# Patient Record
Sex: MALE | Race: WHITE | NOT HISPANIC OR LATINO | Employment: OTHER | URBAN - METROPOLITAN AREA
[De-identification: names, ages, dates, MRNs, and addresses within clinical notes are randomized per-mention and may not be internally consistent; named-entity substitution may affect disease eponyms.]

---

## 2017-06-01 ENCOUNTER — OFFICE VISIT (OUTPATIENT)
Dept: NEUROLOGY | Facility: CLINIC | Age: 77
End: 2017-06-01

## 2017-06-01 VITALS
WEIGHT: 200 LBS | OXYGEN SATURATION: 98 % | HEIGHT: 69 IN | BODY MASS INDEX: 29.62 KG/M2 | DIASTOLIC BLOOD PRESSURE: 95 MMHG | SYSTOLIC BLOOD PRESSURE: 158 MMHG | HEART RATE: 65 BPM

## 2017-06-01 DIAGNOSIS — G93.89 MASS OF LEFT TEMPORAL LOBE: Primary | ICD-10-CM

## 2017-06-01 DIAGNOSIS — R56.9 GENERALIZED CONVULSIVE SEIZURES (HCC): ICD-10-CM

## 2017-06-01 PROCEDURE — 99213 OFFICE O/P EST LOW 20 MIN: CPT | Performed by: PSYCHIATRY & NEUROLOGY

## 2017-06-01 RX ORDER — LEVETIRACETAM 500 MG/1
500 TABLET ORAL 2 TIMES DAILY
Qty: 60 TABLET | Refills: 11 | Status: SHIPPED | OUTPATIENT
Start: 2017-06-01 | End: 2017-11-14 | Stop reason: SDUPTHER

## 2017-06-01 RX ORDER — LATANOPROST 50 UG/ML
SOLUTION/ DROPS OPHTHALMIC
COMMUNITY
Start: 2017-05-25

## 2017-06-01 NOTE — PROGRESS NOTES
Subjective:     Patient ID: Jonatan Ocampo is a 77 y.o. male.    History of Present Illness  The following portions of the patient's history were reviewed and updated as appropriate: allergies, current medications, past family history, past medical history, past social history, past surgical history and problem list.  No seizures, headaches, tolerating Keppra, no chest pain or syncope. He has developed glaucoma, better with eye drops, has been active outside. Last MRI in December was stable.  Review of Systems   Constitutional: Positive for fatigue. Negative for chills, fever and unexpected weight change.   HENT: Negative for ear pain, hearing loss, nosebleeds, rhinorrhea and sore throat.    Eyes: Negative for photophobia, pain, discharge, itching and visual disturbance.   Respiratory: Negative for cough, chest tightness, shortness of breath and wheezing.    Cardiovascular: Negative for chest pain, palpitations and leg swelling.   Gastrointestinal: Negative for abdominal pain, blood in stool, constipation, diarrhea, nausea and vomiting.   Genitourinary: Negative for dysuria, frequency, hematuria and urgency.   Musculoskeletal: Negative for arthralgias, back pain, gait problem, joint swelling, myalgias, neck pain and neck stiffness.   Skin: Negative for rash and wound.   Allergic/Immunologic: Negative for environmental allergies and food allergies.   Neurological: Negative for dizziness, tremors, seizures, syncope, speech difficulty, weakness, light-headedness, numbness and headaches.   Hematological: Negative for adenopathy. Does not bruise/bleed easily.   Psychiatric/Behavioral: Negative for agitation, confusion, decreased concentration, hallucinations, sleep disturbance and suicidal ideas. The patient is not nervous/anxious.         Objective:    Neurologic Exam     Mental Status   Oriented to person, place, and time.       Physical Exam   Constitutional: He is oriented to person, place, and time. He appears  well-developed and well-nourished.   Cardiovascular: Normal rate and regular rhythm.    Pulmonary/Chest: Effort normal.   Neurological: He is alert and oriented to person, place, and time. He has normal reflexes.   Slight facial asymmetry.   Psychiatric: He has a normal mood and affect. His behavior is normal. Thought content normal.       Assessment/Plan:     Jonatan was seen today for seizures.    Diagnoses and all orders for this visit:    Mass of left temporal lobe  -     MRI Brain Without Contrast; Future    Generalized convulsive seizures  -     levETIRAcetam (KEPPRA) 500 MG tablet; Take 1 tablet by mouth 2 (Two) Times a Day.     Encouraged to call for new concerns.

## 2017-09-13 ENCOUNTER — TELEPHONE (OUTPATIENT)
Dept: NEUROLOGY | Facility: CLINIC | Age: 77
End: 2017-09-13

## 2017-09-13 NOTE — TELEPHONE ENCOUNTER
Patient wife called, stated that the time you rescheduled her for was not a good time because Dr Henning wanted to see the patient after he had the MRI completed on 11/14. Please call patient wife to schedule the appointment. She stated that she wanted you to call because she doesn't want just any certain day or time. Verified phone in the chart.

## 2017-11-14 ENCOUNTER — OFFICE VISIT (OUTPATIENT)
Dept: NEUROLOGY | Facility: CLINIC | Age: 77
End: 2017-11-14

## 2017-11-14 ENCOUNTER — HOSPITAL ENCOUNTER (OUTPATIENT)
Dept: MRI IMAGING | Facility: HOSPITAL | Age: 77
Discharge: HOME | End: 2017-11-14
Attending: PSYCHIATRY & NEUROLOGY | Admitting: PSYCHIATRY & NEUROLOGY

## 2017-11-14 VITALS
SYSTOLIC BLOOD PRESSURE: 158 MMHG | OXYGEN SATURATION: 95 % | WEIGHT: 195 LBS | HEIGHT: 69 IN | HEART RATE: 69 BPM | BODY MASS INDEX: 28.88 KG/M2 | DIASTOLIC BLOOD PRESSURE: 86 MMHG

## 2017-11-14 DIAGNOSIS — G93.89 MASS OF LEFT TEMPORAL LOBE: Primary | ICD-10-CM

## 2017-11-14 DIAGNOSIS — G93.89 MASS OF LEFT TEMPORAL LOBE: ICD-10-CM

## 2017-11-14 DIAGNOSIS — R56.9 GENERALIZED CONVULSIVE SEIZURES (HCC): ICD-10-CM

## 2017-11-14 PROCEDURE — 70551 MRI BRAIN STEM W/O DYE: CPT

## 2017-11-14 PROCEDURE — 99213 OFFICE O/P EST LOW 20 MIN: CPT | Performed by: PSYCHIATRY & NEUROLOGY

## 2017-11-14 RX ORDER — LEVETIRACETAM 500 MG/1
500 TABLET ORAL 2 TIMES DAILY
Qty: 180 TABLET | Refills: 3 | Status: SHIPPED | OUTPATIENT
Start: 2017-11-14 | End: 2018-11-06 | Stop reason: SDUPTHER

## 2017-11-14 NOTE — PROGRESS NOTES
Subjective:     Patient ID: Jonatan Ocampo is a 77 y.o. male.    CC:   Chief Complaint   Patient presents with   • Mass of left temporal lobe       HPI:   History of Present Illness  The following portions of the patient's history were reviewed and updated as appropriate: allergies, current medications, past family history, past medical history, past social history, past surgical history and problem list.     Denies headaches, seizures, focal symptoms. Has been driving without problems. MRI brain done today is stable by my review and Dr. Alexandra's report.    Past Medical History:   Diagnosis Date   • Abnormal MRI of head    • Generalized convulsive seizure    • Hyperlipidemia    • Hypertension    • Seizures        Past Surgical History:   Procedure Laterality Date   • NO PAST SURGERIES         Social History     Social History   • Marital status:      Spouse name: N/A   • Number of children: N/A   • Years of education: N/A     Occupational History   • Not on file.     Social History Main Topics   • Smoking status: Never Smoker   • Smokeless tobacco: Not on file   • Alcohol use No   • Drug use: No   • Sexual activity: Defer     Other Topics Concern   • Not on file     Social History Narrative       Family History   Problem Relation Age of Onset   • Alzheimer's disease Mother    • Depression Mother    • Dementia Mother    • Cancer Mother         Review of Systems   Constitutional: Positive for fatigue. Negative for chills, fever and unexpected weight change.   HENT: Negative.  Negative for ear pain, hearing loss, nosebleeds, rhinorrhea and sore throat.    Eyes: Positive for itching. Negative for photophobia, pain, discharge and visual disturbance.   Respiratory: Negative.  Negative for cough, chest tightness, shortness of breath and wheezing.    Cardiovascular: Negative.  Negative for chest pain, palpitations and leg swelling.   Gastrointestinal: Negative.  Negative for abdominal pain, blood in stool,  constipation, diarrhea, nausea and vomiting.   Endocrine: Negative.    Genitourinary: Negative.  Negative for dysuria, frequency, hematuria and urgency.   Musculoskeletal: Negative.  Negative for arthralgias, back pain, gait problem, joint swelling, myalgias, neck pain and neck stiffness.   Skin: Negative.  Negative for rash and wound.   Allergic/Immunologic: Negative.  Negative for environmental allergies and food allergies.   Neurological: Negative.  Negative for dizziness, tremors, seizures, syncope, speech difficulty, weakness, light-headedness, numbness and headaches.   Hematological: Negative.  Negative for adenopathy. Does not bruise/bleed easily.   Psychiatric/Behavioral: Negative.  Negative for agitation, confusion, decreased concentration, hallucinations, sleep disturbance and suicidal ideas. The patient is not nervous/anxious.         Objective:    Neurologic Exam     Mental Status   Oriented to person, place, and time.       Physical Exam   Constitutional: He is oriented to person, place, and time. He appears well-developed and well-nourished.   Cardiovascular: Normal rate and regular rhythm.    Pulmonary/Chest: Effort normal.   Neurological: He is alert and oriented to person, place, and time. He has normal reflexes.   Psychiatric: He has a normal mood and affect. His behavior is normal. Thought content normal.       Assessment/Plan:       Jonatan was seen today for mass of left temporal lobe.    Diagnoses and all orders for this visit:    Mass of left temporal lobe  -     MRI Brain Without Contrast; Future    Generalized convulsive seizures  -     levETIRAcetam (KEPPRA) 500 MG tablet; Take 1 tablet by mouth 2 (Two) Times a Day.       He is to follow up yearly, call for problems.    Cesar Henning MD  11/21/2017

## 2018-10-30 ENCOUNTER — HOSPITAL ENCOUNTER (OUTPATIENT)
Dept: MRI IMAGING | Facility: HOSPITAL | Age: 78
Discharge: HOME | End: 2018-10-30
Attending: PSYCHIATRY & NEUROLOGY | Admitting: PSYCHIATRY & NEUROLOGY

## 2018-10-30 DIAGNOSIS — G93.89 MASS OF LEFT TEMPORAL LOBE: ICD-10-CM

## 2018-10-30 PROCEDURE — 70551 MRI BRAIN STEM W/O DYE: CPT

## 2018-11-06 ENCOUNTER — OFFICE VISIT (OUTPATIENT)
Dept: NEUROLOGY | Facility: CLINIC | Age: 78
End: 2018-11-06

## 2018-11-06 ENCOUNTER — TELEPHONE (OUTPATIENT)
Dept: NEUROLOGY | Facility: CLINIC | Age: 78
End: 2018-11-06

## 2018-11-06 VITALS
HEART RATE: 65 BPM | HEIGHT: 69 IN | SYSTOLIC BLOOD PRESSURE: 168 MMHG | DIASTOLIC BLOOD PRESSURE: 108 MMHG | OXYGEN SATURATION: 98 % | WEIGHT: 195 LBS | BODY MASS INDEX: 28.88 KG/M2

## 2018-11-06 DIAGNOSIS — G93.89 MASS OF LEFT TEMPORAL LOBE: Primary | ICD-10-CM

## 2018-11-06 DIAGNOSIS — R56.9 GENERALIZED CONVULSIVE SEIZURES (HCC): ICD-10-CM

## 2018-11-06 PROCEDURE — 99213 OFFICE O/P EST LOW 20 MIN: CPT | Performed by: PSYCHIATRY & NEUROLOGY

## 2018-11-06 RX ORDER — LEVETIRACETAM 500 MG/1
500 TABLET ORAL EVERY 12 HOURS SCHEDULED
Qty: 180 TABLET | Refills: 5 | Status: SHIPPED | OUTPATIENT
Start: 2018-11-06 | End: 2020-01-09 | Stop reason: SDUPTHER

## 2018-11-06 NOTE — PROGRESS NOTES
Subjective:     Patient ID: Jonatan Ocampo is a 78 y.o. male.    CC:   Chief Complaint   Patient presents with   • Seizures       HPI:   History of Present Illness  The following portions of the patient's history were reviewed and updated as appropriate: allergies, current medications, past family history, past medical history, past social history, past surgical history and problem list.     Denies any seizures or headaches, MRI brain stable, personally reviewed.    Past Medical History:   Diagnosis Date   • Abnormal MRI of head    • Generalized convulsive seizure (CMS/HCC)    • Hyperlipidemia    • Hypertension    • Seizures (CMS/HCC)        Past Surgical History:   Procedure Laterality Date   • NO PAST SURGERIES         Social History     Social History   • Marital status:      Spouse name: N/A   • Number of children: N/A   • Years of education: N/A     Occupational History   • Not on file.     Social History Main Topics   • Smoking status: Never Smoker   • Smokeless tobacco: Not on file   • Alcohol use No   • Drug use: No   • Sexual activity: Defer     Other Topics Concern   • Not on file     Social History Narrative   • No narrative on file       Family History   Problem Relation Age of Onset   • Alzheimer's disease Mother    • Depression Mother    • Dementia Mother    • Cancer Mother         Review of Systems   Constitutional: Positive for fatigue. Negative for chills, fever and unexpected weight change.   HENT: Negative for ear pain, hearing loss, nosebleeds, rhinorrhea and sore throat.    Eyes: Positive for itching. Negative for photophobia, pain, discharge and visual disturbance.   Respiratory: Negative for cough, chest tightness, shortness of breath and wheezing.    Cardiovascular: Negative for chest pain, palpitations and leg swelling.   Gastrointestinal: Negative for abdominal pain, blood in stool, constipation, diarrhea, nausea and vomiting.   Genitourinary: Negative for dysuria, frequency,  hematuria and urgency.   Musculoskeletal: Negative for arthralgias, back pain, gait problem, joint swelling, myalgias, neck pain and neck stiffness.   Skin: Negative for rash and wound.   Allergic/Immunologic: Negative for environmental allergies and food allergies.   Neurological: Negative for dizziness, tremors, seizures, syncope, speech difficulty, weakness, light-headedness, numbness and headaches.   Hematological: Negative for adenopathy. Does not bruise/bleed easily.   Psychiatric/Behavioral: Negative for agitation, confusion, decreased concentration, hallucinations, sleep disturbance and suicidal ideas. The patient is not nervous/anxious.         Objective:    Neurologic Exam     Mental Status   Oriented to person, place, and time.       Physical Exam   Constitutional: He is oriented to person, place, and time. He appears well-developed and well-nourished.   Cardiovascular: Normal rate and regular rhythm.    Pulmonary/Chest: Effort normal.   Neurological: He is alert and oriented to person, place, and time. He has normal reflexes.   Mild facial asymmetry   Psychiatric: He has a normal mood and affect. His behavior is normal. Thought content normal.       Assessment/Plan:       Jonatan was seen today for seizures.    Diagnoses and all orders for this visit:    Mass of left temporal lobe  -     MRI Brain Without Contrast; Future April 2020.    Generalized convulsive seizures (CMS/HCC)  -     levETIRAcetam (KEPPRA) 500 MG tablet; Take 1 tablet by mouth Every 12 (Twelve) Hours.             Cesar Henning MD  11/6/2018

## 2018-11-06 NOTE — TELEPHONE ENCOUNTER
----- Message from Cesar Henning MD sent at 11/2/2018  2:38 PM EDT -----  Regarding: MRI brain  Stable from last year, thanks.  ----- Message -----  From: Interface, Rad Results [place] In  Sent: 10/30/2018   3:58 PM  To: Cesar Henning MD

## 2020-01-01 ENCOUNTER — OFFICE VISIT (OUTPATIENT)
Dept: NEUROLOGY | Facility: CLINIC | Age: 80
End: 2020-01-01

## 2020-01-01 ENCOUNTER — LAB (OUTPATIENT)
Dept: LAB | Facility: HOSPITAL | Age: 80
End: 2020-01-01

## 2020-01-01 ENCOUNTER — TELEPHONE (OUTPATIENT)
Dept: NEUROLOGY | Facility: CLINIC | Age: 80
End: 2020-01-01

## 2020-01-01 VITALS
OXYGEN SATURATION: 97 % | HEART RATE: 67 BPM | HEIGHT: 68 IN | TEMPERATURE: 97.1 F | DIASTOLIC BLOOD PRESSURE: 90 MMHG | WEIGHT: 200 LBS | BODY MASS INDEX: 30.31 KG/M2 | SYSTOLIC BLOOD PRESSURE: 150 MMHG

## 2020-01-01 DIAGNOSIS — R53.82 CHRONIC FATIGUE: ICD-10-CM

## 2020-01-01 DIAGNOSIS — G93.89: ICD-10-CM

## 2020-01-01 DIAGNOSIS — R41.3 MEMORY LOSS: Primary | ICD-10-CM

## 2020-01-01 DIAGNOSIS — G47.10 HYPERSOMNOLENCE: ICD-10-CM

## 2020-01-01 DIAGNOSIS — R56.9 GENERALIZED CONVULSIVE SEIZURES (HCC): ICD-10-CM

## 2020-01-01 LAB
ALBUMIN SERPL-MCNC: 4.1 G/DL (ref 3.5–5.2)
ALBUMIN/GLOB SERPL: 1.1 G/DL
ALP SERPL-CCNC: 60 U/L (ref 39–117)
ALT SERPL W P-5'-P-CCNC: 13 U/L (ref 1–41)
ANION GAP SERPL CALCULATED.3IONS-SCNC: 6.5 MMOL/L (ref 5–15)
AST SERPL-CCNC: 19 U/L (ref 1–40)
BASOPHILS # BLD AUTO: 0.05 10*3/MM3 (ref 0–0.2)
BASOPHILS NFR BLD AUTO: 0.6 % (ref 0–1.5)
BILIRUB SERPL-MCNC: 0.6 MG/DL (ref 0–1.2)
BUN SERPL-MCNC: 17 MG/DL (ref 8–23)
BUN/CREAT SERPL: 14.3 (ref 7–25)
CALCIUM SPEC-SCNC: 9.5 MG/DL (ref 8.6–10.5)
CHLORIDE SERPL-SCNC: 105 MMOL/L (ref 98–107)
CO2 SERPL-SCNC: 28.5 MMOL/L (ref 22–29)
CREAT SERPL-MCNC: 1.19 MG/DL (ref 0.76–1.27)
DEPRECATED RDW RBC AUTO: 48.8 FL (ref 37–54)
EOSINOPHIL # BLD AUTO: 0.14 10*3/MM3 (ref 0–0.4)
EOSINOPHIL NFR BLD AUTO: 1.8 % (ref 0.3–6.2)
ERYTHROCYTE [DISTWIDTH] IN BLOOD BY AUTOMATED COUNT: 13.9 % (ref 12.3–15.4)
FOLATE SERPL-MCNC: 16.4 NG/ML (ref 4.78–24.2)
GFR SERPL CREATININE-BSD FRML MDRD: 59 ML/MIN/1.73
GLOBULIN UR ELPH-MCNC: 3.6 GM/DL
GLUCOSE SERPL-MCNC: 81 MG/DL (ref 65–99)
HCT VFR BLD AUTO: 43.4 % (ref 37.5–51)
HGB BLD-MCNC: 14.6 G/DL (ref 13–17.7)
IMM GRANULOCYTES # BLD AUTO: 0.01 10*3/MM3 (ref 0–0.05)
IMM GRANULOCYTES NFR BLD AUTO: 0.1 % (ref 0–0.5)
LYMPHOCYTES # BLD AUTO: 1.55 10*3/MM3 (ref 0.7–3.1)
LYMPHOCYTES NFR BLD AUTO: 19.9 % (ref 19.6–45.3)
Lab: NORMAL
MCH RBC QN AUTO: 31.9 PG (ref 26.6–33)
MCHC RBC AUTO-ENTMCNC: 33.6 G/DL (ref 31.5–35.7)
MCV RBC AUTO: 95 FL (ref 79–97)
METHYLMALONATE SERPL-SCNC: 173 NMOL/L (ref 0–378)
MONOCYTES # BLD AUTO: 0.66 10*3/MM3 (ref 0.1–0.9)
MONOCYTES NFR BLD AUTO: 8.5 % (ref 5–12)
NEUTROPHILS NFR BLD AUTO: 5.36 10*3/MM3 (ref 1.7–7)
NEUTROPHILS NFR BLD AUTO: 69.1 % (ref 42.7–76)
NRBC BLD AUTO-RTO: 0 /100 WBC (ref 0–0.2)
PLATELET # BLD AUTO: 229 10*3/MM3 (ref 140–450)
PMV BLD AUTO: 10.1 FL (ref 6–12)
POTASSIUM SERPL-SCNC: 4.2 MMOL/L (ref 3.5–5.2)
PROT SERPL-MCNC: 7.7 G/DL (ref 6–8.5)
RBC # BLD AUTO: 4.57 10*6/MM3 (ref 4.14–5.8)
SODIUM SERPL-SCNC: 140 MMOL/L (ref 136–145)
T4 FREE SERPL-MCNC: 1 NG/DL (ref 0.93–1.7)
TSH SERPL DL<=0.05 MIU/L-ACNC: 1.69 UIU/ML (ref 0.27–4.2)
VIT B12 BLD-MCNC: 456 PG/ML (ref 211–946)
WBC # BLD AUTO: 7.77 10*3/MM3 (ref 3.4–10.8)

## 2020-01-01 PROCEDURE — 83921 ORGANIC ACID SINGLE QUANT: CPT | Performed by: PSYCHIATRY & NEUROLOGY

## 2020-01-01 PROCEDURE — 99214 OFFICE O/P EST MOD 30 MIN: CPT | Performed by: PSYCHIATRY & NEUROLOGY

## 2020-01-01 PROCEDURE — 85025 COMPLETE CBC W/AUTO DIFF WBC: CPT | Performed by: PSYCHIATRY & NEUROLOGY

## 2020-01-01 PROCEDURE — 82607 VITAMIN B-12: CPT | Performed by: PSYCHIATRY & NEUROLOGY

## 2020-01-01 PROCEDURE — 84443 ASSAY THYROID STIM HORMONE: CPT | Performed by: PSYCHIATRY & NEUROLOGY

## 2020-01-01 PROCEDURE — 82746 ASSAY OF FOLIC ACID SERUM: CPT | Performed by: PSYCHIATRY & NEUROLOGY

## 2020-01-01 PROCEDURE — 80053 COMPREHEN METABOLIC PANEL: CPT | Performed by: PSYCHIATRY & NEUROLOGY

## 2020-01-01 PROCEDURE — 36415 COLL VENOUS BLD VENIPUNCTURE: CPT | Performed by: PSYCHIATRY & NEUROLOGY

## 2020-01-01 PROCEDURE — 84439 ASSAY OF FREE THYROXINE: CPT | Performed by: PSYCHIATRY & NEUROLOGY

## 2020-01-01 RX ORDER — LEVETIRACETAM 500 MG/1
250 TABLET ORAL EVERY 12 HOURS SCHEDULED
Qty: 180 TABLET | Refills: 5
Start: 2020-01-01

## 2020-01-09 ENCOUNTER — TELEPHONE (OUTPATIENT)
Dept: NEUROLOGY | Facility: CLINIC | Age: 80
End: 2020-01-09

## 2020-01-09 DIAGNOSIS — G93.89 MASS OF LEFT TEMPORAL LOBE: ICD-10-CM

## 2020-01-09 DIAGNOSIS — R56.9 GENERALIZED CONVULSIVE SEIZURES (HCC): Primary | ICD-10-CM

## 2020-01-09 RX ORDER — LEVETIRACETAM 500 MG/1
500 TABLET ORAL EVERY 12 HOURS SCHEDULED
Qty: 180 TABLET | Refills: 5 | Status: SHIPPED | OUTPATIENT
Start: 2020-01-09 | End: 2020-01-01 | Stop reason: SDUPTHER

## 2020-01-09 NOTE — TELEPHONE ENCOUNTER
Patient and wife came into the office and was wanting to know if you can put in a new order for the MRI and refill his prescription for Keppra. He has an appt in March and he hasn't been in office since November of 2018 Please advise

## 2020-01-30 ENCOUNTER — HOSPITAL ENCOUNTER (OUTPATIENT)
Dept: MRI IMAGING | Facility: HOSPITAL | Age: 80
Discharge: HOME | End: 2020-01-30
Admitting: PSYCHIATRY & NEUROLOGY

## 2020-01-30 PROCEDURE — 70551 MRI BRAIN STEM W/O DYE: CPT

## 2020-01-31 ENCOUNTER — TELEPHONE (OUTPATIENT)
Dept: NEUROLOGY | Facility: CLINIC | Age: 80
End: 2020-01-31

## 2020-01-31 NOTE — TELEPHONE ENCOUNTER
----- Message from Cesar Henning MD sent at 1/30/2020  6:50 PM EST -----  Regarding: MRI brain  Stable from 2018, call for problems, thanks.  ----- Message -----  From: Interface, Rad Results [place] In  Sent: 1/30/2020   5:56 PM EST  To: Cesar Henning MD

## 2020-11-05 NOTE — TELEPHONE ENCOUNTER
Patients spouse called and requested that we mail out the most recent lab results and contact them and go over the lab results with them also.       Can we please mail those results to them so he can have them in hand to take to his next PCP visit in 3 weeks?       Can someone also please contact them and go over these results?       [phone]

## 2021-01-01 ENCOUNTER — APPOINTMENT (OUTPATIENT)
Dept: GENERAL RADIOLOGY | Facility: HOSPITAL | Age: 81
End: 2021-01-01

## 2021-01-01 ENCOUNTER — HOSPITAL ENCOUNTER (INPATIENT)
Facility: HOSPITAL | Age: 81
LOS: 1 days | End: 2021-07-10
Attending: INTERNAL MEDICINE | Admitting: INTERNAL MEDICINE

## 2021-01-01 ENCOUNTER — APPOINTMENT (OUTPATIENT)
Dept: CARDIOLOGY | Facility: HOSPITAL | Age: 81
End: 2021-01-01

## 2021-01-01 ENCOUNTER — APPOINTMENT (OUTPATIENT)
Dept: CT IMAGING | Facility: HOSPITAL | Age: 81
End: 2021-01-01

## 2021-01-01 ENCOUNTER — TELEPHONE (OUTPATIENT)
Dept: NEUROLOGY | Facility: CLINIC | Age: 81
End: 2021-01-01

## 2021-01-01 ENCOUNTER — HOSPITAL ENCOUNTER (OUTPATIENT)
Dept: MRI IMAGING | Facility: HOSPITAL | Age: 81
Discharge: HOME | End: 2021-03-16
Admitting: PSYCHIATRY & NEUROLOGY

## 2021-01-01 ENCOUNTER — APPOINTMENT (OUTPATIENT)
Dept: NEUROLOGY | Facility: HOSPITAL | Age: 81
End: 2021-01-01

## 2021-01-01 ENCOUNTER — HOSPITAL ENCOUNTER (INPATIENT)
Facility: HOSPITAL | Age: 81
LOS: 4 days | End: 2021-07-09
Attending: EMERGENCY MEDICINE | Admitting: INTERNAL MEDICINE

## 2021-01-01 ENCOUNTER — OFFICE VISIT (OUTPATIENT)
Dept: NEUROLOGY | Facility: CLINIC | Age: 81
End: 2021-01-01

## 2021-01-01 ENCOUNTER — APPOINTMENT (OUTPATIENT)
Dept: MRI IMAGING | Facility: HOSPITAL | Age: 81
End: 2021-01-01

## 2021-01-01 VITALS
SYSTOLIC BLOOD PRESSURE: 169 MMHG | OXYGEN SATURATION: 84 % | RESPIRATION RATE: 26 BRPM | DIASTOLIC BLOOD PRESSURE: 86 MMHG | HEART RATE: 143 BPM

## 2021-01-01 VITALS
RESPIRATION RATE: 28 BRPM | WEIGHT: 198.8 LBS | DIASTOLIC BLOOD PRESSURE: 87 MMHG | HEIGHT: 69 IN | HEART RATE: 143 BPM | SYSTOLIC BLOOD PRESSURE: 105 MMHG | OXYGEN SATURATION: 89 % | TEMPERATURE: 99 F | BODY MASS INDEX: 29.44 KG/M2

## 2021-01-01 DIAGNOSIS — Z87.898 HISTORY OF SEIZURE: ICD-10-CM

## 2021-01-01 DIAGNOSIS — R47.1 DYSARTHRIA: ICD-10-CM

## 2021-01-01 DIAGNOSIS — R11.2 NON-INTRACTABLE VOMITING WITH NAUSEA, UNSPECIFIED VOMITING TYPE: ICD-10-CM

## 2021-01-01 DIAGNOSIS — I16.1 HYPERTENSIVE EMERGENCY: ICD-10-CM

## 2021-01-01 DIAGNOSIS — R53.82 CHRONIC FATIGUE: ICD-10-CM

## 2021-01-01 DIAGNOSIS — R56.9 GENERALIZED CONVULSIVE SEIZURES (HCC): ICD-10-CM

## 2021-01-01 DIAGNOSIS — G93.89: Primary | ICD-10-CM

## 2021-01-01 DIAGNOSIS — R13.10 DYSPHAGIA, UNSPECIFIED TYPE: ICD-10-CM

## 2021-01-01 DIAGNOSIS — G93.89: ICD-10-CM

## 2021-01-01 DIAGNOSIS — R41.841 COGNITIVE COMMUNICATION DEFICIT: ICD-10-CM

## 2021-01-01 DIAGNOSIS — R41.82 ALTERED MENTAL STATUS, UNSPECIFIED ALTERED MENTAL STATUS TYPE: Primary | ICD-10-CM

## 2021-01-01 DIAGNOSIS — I63.9 CEREBROVASCULAR ACCIDENT (CVA), UNSPECIFIED MECHANISM (HCC): ICD-10-CM

## 2021-01-01 LAB
ALBUMIN SERPL-MCNC: 3 G/DL (ref 3.5–5.2)
ALBUMIN SERPL-MCNC: 3.4 G/DL (ref 3.5–5.2)
ALBUMIN SERPL-MCNC: 3.9 G/DL (ref 3.5–5.2)
ALBUMIN/GLOB SERPL: 0.7 G/DL
ALBUMIN/GLOB SERPL: 0.8 G/DL
ALBUMIN/GLOB SERPL: 1.2 G/DL
ALP SERPL-CCNC: 61 U/L (ref 39–117)
ALP SERPL-CCNC: 70 U/L (ref 39–117)
ALP SERPL-CCNC: 72 U/L (ref 39–117)
ALT SERPL W P-5'-P-CCNC: 14 U/L (ref 1–41)
ALT SERPL W P-5'-P-CCNC: 18 U/L (ref 1–41)
ALT SERPL W P-5'-P-CCNC: 19 U/L (ref 1–41)
ANION GAP SERPL CALCULATED.3IONS-SCNC: 11 MMOL/L (ref 5–15)
ANION GAP SERPL CALCULATED.3IONS-SCNC: 12 MMOL/L (ref 5–15)
ANION GAP SERPL CALCULATED.3IONS-SCNC: 14 MMOL/L (ref 5–15)
ANION GAP SERPL CALCULATED.3IONS-SCNC: 14 MMOL/L (ref 5–15)
ANION GAP SERPL CALCULATED.3IONS-SCNC: 17 MMOL/L (ref 5–15)
ANION GAP SERPL CALCULATED.3IONS-SCNC: 8 MMOL/L (ref 5–15)
ARTERIAL PATENCY WRIST A: ABNORMAL
AST SERPL-CCNC: 22 U/L (ref 1–40)
AST SERPL-CCNC: 38 U/L (ref 1–40)
AST SERPL-CCNC: 52 U/L (ref 1–40)
ATMOSPHERIC PRESS: ABNORMAL MM[HG]
BACTERIA SPEC AEROBE CULT: NORMAL
BACTERIA SPEC AEROBE CULT: NORMAL
BACTERIA UR QL AUTO: ABNORMAL /HPF
BASE EXCESS BLDA CALC-SCNC: -1 MMOL/L (ref -5–5)
BASE EXCESS BLDA CALC-SCNC: -1.4 MMOL/L (ref 0–2)
BASOPHILS # BLD AUTO: 0.02 10*3/MM3 (ref 0–0.2)
BASOPHILS # BLD AUTO: 0.02 10*3/MM3 (ref 0–0.2)
BASOPHILS # BLD AUTO: 0.03 10*3/MM3 (ref 0–0.2)
BASOPHILS # BLD MANUAL: 0 10*3/MM3 (ref 0–0.2)
BASOPHILS NFR BLD AUTO: 0 % (ref 0–1.5)
BASOPHILS NFR BLD AUTO: 0.1 % (ref 0–1.5)
BASOPHILS NFR BLD AUTO: 0.2 % (ref 0–1.5)
BASOPHILS NFR BLD AUTO: 0.2 % (ref 0–1.5)
BDY SITE: ABNORMAL
BH CV ECHO MEAS - AO MAX PG (FULL): 4.1 MMHG
BH CV ECHO MEAS - AO MAX PG: 9.4 MMHG
BH CV ECHO MEAS - AO MEAN PG (FULL): 3 MMHG
BH CV ECHO MEAS - AO MEAN PG: 5.5 MMHG
BH CV ECHO MEAS - AO ROOT AREA (BSA CORRECTED): 1.7
BH CV ECHO MEAS - AO ROOT AREA: 9.5 CM^2
BH CV ECHO MEAS - AO ROOT DIAM: 3.5 CM
BH CV ECHO MEAS - AO V2 MAX: 152.9 CM/SEC
BH CV ECHO MEAS - AO V2 MEAN: 109.6 CM/SEC
BH CV ECHO MEAS - AO V2 VTI: 23.1 CM
BH CV ECHO MEAS - ASC AORTA: 2.7 CM
BH CV ECHO MEAS - AVA(I,A): 2.2 CM^2
BH CV ECHO MEAS - AVA(I,D): 2.2 CM^2
BH CV ECHO MEAS - AVA(V,A): 2.7 CM^2
BH CV ECHO MEAS - AVA(V,D): 2.7 CM^2
BH CV ECHO MEAS - BSA(HAYCOCK): 2.1 M^2
BH CV ECHO MEAS - BSA: 2.1 M^2
BH CV ECHO MEAS - BZI_BMI: 29.1 KILOGRAMS/M^2
BH CV ECHO MEAS - BZI_METRIC_HEIGHT: 175.3 CM
BH CV ECHO MEAS - BZI_METRIC_WEIGHT: 89.4 KG
BH CV ECHO MEAS - EDV(CUBED): 74.7 ML
BH CV ECHO MEAS - EDV(MOD-SP2): 70 ML
BH CV ECHO MEAS - EDV(MOD-SP4): 91 ML
BH CV ECHO MEAS - EDV(TEICH): 79.1 ML
BH CV ECHO MEAS - EF(CUBED): 75.4 %
BH CV ECHO MEAS - EF(MOD-BP): 59 %
BH CV ECHO MEAS - EF(MOD-SP2): 57.1 %
BH CV ECHO MEAS - EF(MOD-SP4): 60.4 %
BH CV ECHO MEAS - EF(TEICH): 67.7 %
BH CV ECHO MEAS - ESV(CUBED): 18.4 ML
BH CV ECHO MEAS - ESV(MOD-SP2): 30 ML
BH CV ECHO MEAS - ESV(MOD-SP4): 36 ML
BH CV ECHO MEAS - ESV(TEICH): 25.6 ML
BH CV ECHO MEAS - FS: 37.3 %
BH CV ECHO MEAS - IVS/LVPW: 0.89
BH CV ECHO MEAS - IVSD: 0.87 CM
BH CV ECHO MEAS - LA DIMENSION: 3.1 CM
BH CV ECHO MEAS - LA/AO: 0.9
BH CV ECHO MEAS - LAD MAJOR: 5.1 CM
BH CV ECHO MEAS - LAT PEAK E' VEL: 5.7 CM/SEC
BH CV ECHO MEAS - LATERAL E/E' RATIO: 11.9
BH CV ECHO MEAS - LV DIASTOLIC VOL/BSA (35-75): 44.3 ML/M^2
BH CV ECHO MEAS - LV IVRT: 0.11 SEC
BH CV ECHO MEAS - LV MASS(C)D: 123.8 GRAMS
BH CV ECHO MEAS - LV MASS(C)DI: 60.3 GRAMS/M^2
BH CV ECHO MEAS - LV MAX PG: 5.3 MMHG
BH CV ECHO MEAS - LV MEAN PG: 2.5 MMHG
BH CV ECHO MEAS - LV SYSTOLIC VOL/BSA (12-30): 17.5 ML/M^2
BH CV ECHO MEAS - LV V1 MAX: 114.6 CM/SEC
BH CV ECHO MEAS - LV V1 MEAN: 69.3 CM/SEC
BH CV ECHO MEAS - LV V1 VTI: 14.1 CM
BH CV ECHO MEAS - LVIDD: 4.2 CM
BH CV ECHO MEAS - LVIDS: 2.6 CM
BH CV ECHO MEAS - LVLD AP2: 7.6 CM
BH CV ECHO MEAS - LVLD AP4: 7.7 CM
BH CV ECHO MEAS - LVLS AP2: 6.9 CM
BH CV ECHO MEAS - LVLS AP4: 7.1 CM
BH CV ECHO MEAS - LVOT AREA (M): 3.5 CM^2
BH CV ECHO MEAS - LVOT AREA: 3.6 CM^2
BH CV ECHO MEAS - LVOT DIAM: 2.1 CM
BH CV ECHO MEAS - LVPWD: 0.98 CM
BH CV ECHO MEAS - MED PEAK E' VEL: 12.9 CM/SEC
BH CV ECHO MEAS - MEDIAL E/E' RATIO: 5.4
BH CV ECHO MEAS - MV A MAX VEL: 128.3 CM/SEC
BH CV ECHO MEAS - MV DEC TIME: 0.15 SEC
BH CV ECHO MEAS - MV E MAX VEL: 69.7 CM/SEC
BH CV ECHO MEAS - MV E/A: 0.54
BH CV ECHO MEAS - PA ACC SLOPE: 1186 CM/SEC^2
BH CV ECHO MEAS - PA ACC TIME: 0.08 SEC
BH CV ECHO MEAS - PA PR(ACCEL): 41 MMHG
BH CV ECHO MEAS - PI END-D VEL: 114 CM/SEC
BH CV ECHO MEAS - SI(AO): 106.7 ML/M^2
BH CV ECHO MEAS - SI(CUBED): 27.4 ML/M^2
BH CV ECHO MEAS - SI(LVOT): 24.6 ML/M^2
BH CV ECHO MEAS - SI(MOD-SP2): 19.5 ML/M^2
BH CV ECHO MEAS - SI(MOD-SP4): 26.8 ML/M^2
BH CV ECHO MEAS - SI(TEICH): 26.1 ML/M^2
BH CV ECHO MEAS - SV(AO): 219.1 ML
BH CV ECHO MEAS - SV(CUBED): 56.3 ML
BH CV ECHO MEAS - SV(LVOT): 50.5 ML
BH CV ECHO MEAS - SV(MOD-SP2): 40 ML
BH CV ECHO MEAS - SV(MOD-SP4): 55 ML
BH CV ECHO MEAS - SV(TEICH): 53.5 ML
BH CV ECHO MEAS - TAPSE (>1.6): 1.9 CM
BH CV ECHO MEASUREMENTS AVERAGE E/E' RATIO: 7.49
BH CV VAS BP LEFT ARM: NORMAL MMHG
BH CV XLRA - RV BASE: 3.2 CM
BH CV XLRA - RV LENGTH: 7.7 CM
BH CV XLRA - RV MID: 2.4 CM
BH CV XLRA - TDI S': 28.6 CM/SEC
BILIRUB SERPL-MCNC: 0.6 MG/DL (ref 0–1.2)
BILIRUB SERPL-MCNC: 0.8 MG/DL (ref 0–1.2)
BILIRUB SERPL-MCNC: 1.1 MG/DL (ref 0–1.2)
BILIRUB UR QL STRIP: NEGATIVE
BODY TEMPERATURE: 37 C
BUN SERPL-MCNC: 12 MG/DL (ref 8–23)
BUN SERPL-MCNC: 28 MG/DL (ref 8–23)
BUN SERPL-MCNC: 33 MG/DL (ref 8–23)
BUN SERPL-MCNC: 37 MG/DL (ref 8–23)
BUN SERPL-MCNC: 50 MG/DL (ref 8–23)
BUN SERPL-MCNC: 52 MG/DL (ref 8–23)
BUN/CREAT SERPL: 12.2 (ref 7–25)
BUN/CREAT SERPL: 12.4 (ref 7–25)
BUN/CREAT SERPL: 14.1 (ref 7–25)
BUN/CREAT SERPL: 25.6 (ref 7–25)
BUN/CREAT SERPL: 31 (ref 7–25)
BUN/CREAT SERPL: 32.7 (ref 7–25)
CA-I BLDA-SCNC: 1.14 MMOL/L (ref 1.2–1.32)
CALCIUM SPEC-SCNC: 8.3 MG/DL (ref 8.6–10.5)
CALCIUM SPEC-SCNC: 8.6 MG/DL (ref 8.6–10.5)
CALCIUM SPEC-SCNC: 8.7 MG/DL (ref 8.6–10.5)
CALCIUM SPEC-SCNC: 8.7 MG/DL (ref 8.6–10.5)
CALCIUM SPEC-SCNC: 8.9 MG/DL (ref 8.6–10.5)
CALCIUM SPEC-SCNC: 9 MG/DL (ref 8.6–10.5)
CHLORIDE SERPL-SCNC: 101 MMOL/L (ref 98–107)
CHLORIDE SERPL-SCNC: 103 MMOL/L (ref 98–107)
CHLORIDE SERPL-SCNC: 105 MMOL/L (ref 98–107)
CHLORIDE SERPL-SCNC: 106 MMOL/L (ref 98–107)
CHLORIDE SERPL-SCNC: 108 MMOL/L (ref 98–107)
CHLORIDE SERPL-SCNC: 111 MMOL/L (ref 98–107)
CLARITY UR: CLEAR
CO2 BLDA-SCNC: 24 MMOL/L (ref 24–29)
CO2 BLDA-SCNC: 25.4 MMOL/L (ref 22–33)
CO2 SERPL-SCNC: 18 MMOL/L (ref 22–29)
CO2 SERPL-SCNC: 22 MMOL/L (ref 22–29)
CO2 SERPL-SCNC: 22 MMOL/L (ref 22–29)
CO2 SERPL-SCNC: 24 MMOL/L (ref 22–29)
CO2 SERPL-SCNC: 30 MMOL/L (ref 22–29)
CO2 SERPL-SCNC: 34 MMOL/L (ref 22–29)
COHGB MFR BLD: 0.8 % (ref 0–2)
COLOR UR: ABNORMAL
CREAT BLDA-MCNC: 1 MG/DL (ref 0.6–1.3)
CREAT SERPL-MCNC: 0.97 MG/DL (ref 0.76–1.27)
CREAT SERPL-MCNC: 1.13 MG/DL (ref 0.76–1.27)
CREAT SERPL-MCNC: 1.68 MG/DL (ref 0.76–1.27)
CREAT SERPL-MCNC: 1.95 MG/DL (ref 0.76–1.27)
CREAT SERPL-MCNC: 2.29 MG/DL (ref 0.76–1.27)
CREAT SERPL-MCNC: 2.34 MG/DL (ref 0.76–1.27)
D DIMER PPP FEU-MCNC: 9.53 MCGFEU/ML (ref 0–0.56)
D-LACTATE SERPL-SCNC: 2.3 MMOL/L (ref 0.5–2)
D-LACTATE SERPL-SCNC: 3 MMOL/L (ref 0.5–2)
DEPRECATED RDW RBC AUTO: 45.7 FL (ref 37–54)
DEPRECATED RDW RBC AUTO: 47.2 FL (ref 37–54)
DEPRECATED RDW RBC AUTO: 51.1 FL (ref 37–54)
DEPRECATED RDW RBC AUTO: 51.8 FL (ref 37–54)
DEPRECATED RDW RBC AUTO: 52 FL (ref 37–54)
DEPRECATED RDW RBC AUTO: 56.6 FL (ref 37–54)
EOSINOPHIL # BLD AUTO: 0 10*3/MM3 (ref 0–0.4)
EOSINOPHIL # BLD AUTO: 0.13 10*3/MM3 (ref 0–0.4)
EOSINOPHIL # BLD AUTO: 0.24 10*3/MM3 (ref 0–0.4)
EOSINOPHIL # BLD MANUAL: 0 10*3/MM3 (ref 0–0.4)
EOSINOPHIL NFR BLD AUTO: 0 % (ref 0.3–6.2)
EOSINOPHIL NFR BLD AUTO: 1.6 % (ref 0.3–6.2)
EOSINOPHIL NFR BLD AUTO: 1.6 % (ref 0.3–6.2)
EOSINOPHIL NFR BLD MANUAL: 0 % (ref 0.3–6.2)
EPAP: 0
ERYTHROCYTE [DISTWIDTH] IN BLOOD BY AUTOMATED COUNT: 13.5 % (ref 12.3–15.4)
ERYTHROCYTE [DISTWIDTH] IN BLOOD BY AUTOMATED COUNT: 14.1 % (ref 12.3–15.4)
ERYTHROCYTE [DISTWIDTH] IN BLOOD BY AUTOMATED COUNT: 14.2 % (ref 12.3–15.4)
ERYTHROCYTE [DISTWIDTH] IN BLOOD BY AUTOMATED COUNT: 14.2 % (ref 12.3–15.4)
ERYTHROCYTE [DISTWIDTH] IN BLOOD BY AUTOMATED COUNT: 14.5 % (ref 12.3–15.4)
ERYTHROCYTE [DISTWIDTH] IN BLOOD BY AUTOMATED COUNT: 14.5 % (ref 12.3–15.4)
GFR SERPL CREATININE-BSD FRML MDRD: 27 ML/MIN/1.73
GFR SERPL CREATININE-BSD FRML MDRD: 27 ML/MIN/1.73
GFR SERPL CREATININE-BSD FRML MDRD: 33 ML/MIN/1.73
GFR SERPL CREATININE-BSD FRML MDRD: 39 ML/MIN/1.73
GFR SERPL CREATININE-BSD FRML MDRD: 62 ML/MIN/1.73
GFR SERPL CREATININE-BSD FRML MDRD: 74 ML/MIN/1.73
GLOBULIN UR ELPH-MCNC: 3.3 GM/DL
GLOBULIN UR ELPH-MCNC: 4.1 GM/DL
GLOBULIN UR ELPH-MCNC: 4.1 GM/DL
GLUCOSE BLDC GLUCOMTR-MCNC: 122 MG/DL (ref 70–130)
GLUCOSE BLDC GLUCOMTR-MCNC: 137 MG/DL (ref 70–130)
GLUCOSE BLDC GLUCOMTR-MCNC: 138 MG/DL (ref 70–130)
GLUCOSE BLDC GLUCOMTR-MCNC: 138 MG/DL (ref 70–130)
GLUCOSE BLDC GLUCOMTR-MCNC: 139 MG/DL (ref 70–130)
GLUCOSE BLDC GLUCOMTR-MCNC: 140 MG/DL (ref 70–130)
GLUCOSE BLDC GLUCOMTR-MCNC: 140 MG/DL (ref 70–130)
GLUCOSE BLDC GLUCOMTR-MCNC: 147 MG/DL (ref 70–130)
GLUCOSE BLDC GLUCOMTR-MCNC: 158 MG/DL (ref 70–130)
GLUCOSE BLDC GLUCOMTR-MCNC: 159 MG/DL (ref 70–130)
GLUCOSE BLDC GLUCOMTR-MCNC: 159 MG/DL (ref 70–130)
GLUCOSE BLDC GLUCOMTR-MCNC: 165 MG/DL (ref 70–130)
GLUCOSE BLDC GLUCOMTR-MCNC: 176 MG/DL (ref 70–130)
GLUCOSE BLDC GLUCOMTR-MCNC: 179 MG/DL (ref 70–130)
GLUCOSE BLDC GLUCOMTR-MCNC: 184 MG/DL (ref 70–130)
GLUCOSE BLDC GLUCOMTR-MCNC: 194 MG/DL (ref 70–130)
GLUCOSE SERPL-MCNC: 141 MG/DL (ref 65–99)
GLUCOSE SERPL-MCNC: 144 MG/DL (ref 65–99)
GLUCOSE SERPL-MCNC: 160 MG/DL (ref 65–99)
GLUCOSE SERPL-MCNC: 161 MG/DL (ref 65–99)
GLUCOSE SERPL-MCNC: 176 MG/DL (ref 65–99)
GLUCOSE SERPL-MCNC: 178 MG/DL (ref 65–99)
GLUCOSE UR STRIP-MCNC: ABNORMAL MG/DL
HCO3 BLDA-SCNC: 23.2 MMOL/L (ref 22–26)
HCO3 BLDA-SCNC: 24.1 MMOL/L (ref 20–26)
HCT VFR BLD AUTO: 41.1 % (ref 37.5–51)
HCT VFR BLD AUTO: 44.6 % (ref 37.5–51)
HCT VFR BLD AUTO: 46.7 % (ref 37.5–51)
HCT VFR BLD AUTO: 49 % (ref 37.5–51)
HCT VFR BLD AUTO: 49.8 % (ref 37.5–51)
HCT VFR BLD AUTO: 54.3 % (ref 37.5–51)
HCT VFR BLD CALC: 52.8 %
HCT VFR BLDA CALC: 41 % (ref 38–51)
HGB BLD-MCNC: 13.8 G/DL (ref 13–17.7)
HGB BLD-MCNC: 14 G/DL (ref 13–17.7)
HGB BLD-MCNC: 15 G/DL (ref 13–17.7)
HGB BLD-MCNC: 15.8 G/DL (ref 13–17.7)
HGB BLD-MCNC: 17.2 G/DL (ref 13–17.7)
HGB BLD-MCNC: 17.4 G/DL (ref 13–17.7)
HGB BLDA-MCNC: 13.9 G/DL (ref 12–17)
HGB BLDA-MCNC: 17.2 G/DL (ref 13.5–17.5)
HGB UR QL STRIP.AUTO: ABNORMAL
HOLD SPECIMEN: NORMAL
HYALINE CASTS UR QL AUTO: ABNORMAL /LPF
IMM GRANULOCYTES # BLD AUTO: 0.03 10*3/MM3 (ref 0–0.05)
IMM GRANULOCYTES # BLD AUTO: 0.04 10*3/MM3 (ref 0–0.05)
IMM GRANULOCYTES # BLD AUTO: 0.05 10*3/MM3 (ref 0–0.05)
IMM GRANULOCYTES NFR BLD AUTO: 0.2 % (ref 0–0.5)
IMM GRANULOCYTES NFR BLD AUTO: 0.3 % (ref 0–0.5)
IMM GRANULOCYTES NFR BLD AUTO: 0.5 % (ref 0–0.5)
INHALED O2 CONCENTRATION: 100 %
IPAP: 0
KETONES UR QL STRIP: ABNORMAL
LEFT ATRIUM VOLUME INDEX: 20.5 ML/M^2
LEFT ATRIUM VOLUME: 42 ML
LEUKOCYTE ESTERASE UR QL STRIP.AUTO: NEGATIVE
LEVETIRACETAM SERPL-MCNC: 24.3 UG/ML (ref 10–40)
LV EF 2D ECHO EST: 65 %
LYMPHOCYTES # BLD AUTO: 0.72 10*3/MM3 (ref 0.7–3.1)
LYMPHOCYTES # BLD AUTO: 1.02 10*3/MM3 (ref 0.7–3.1)
LYMPHOCYTES # BLD AUTO: 1.88 10*3/MM3 (ref 0.7–3.1)
LYMPHOCYTES # BLD MANUAL: 1.5 10*3/MM3 (ref 0.7–3.1)
LYMPHOCYTES NFR BLD AUTO: 23.1 % (ref 19.6–45.3)
LYMPHOCYTES NFR BLD AUTO: 4.8 % (ref 19.6–45.3)
LYMPHOCYTES NFR BLD AUTO: 5.4 % (ref 19.6–45.3)
LYMPHOCYTES NFR BLD MANUAL: 3 % (ref 5–12)
LYMPHOCYTES NFR BLD MANUAL: 6 % (ref 19.6–45.3)
MAGNESIUM SERPL-MCNC: 2 MG/DL (ref 1.6–2.4)
MAGNESIUM SERPL-MCNC: 2.2 MG/DL (ref 1.6–2.4)
MCH RBC QN AUTO: 31.2 PG (ref 26.6–33)
MCH RBC QN AUTO: 31.4 PG (ref 26.6–33)
MCH RBC QN AUTO: 31.6 PG (ref 26.6–33)
MCH RBC QN AUTO: 31.7 PG (ref 26.6–33)
MCH RBC QN AUTO: 31.8 PG (ref 26.6–33)
MCH RBC QN AUTO: 32.5 PG (ref 26.6–33)
MCHC RBC AUTO-ENTMCNC: 30.9 G/DL (ref 31.5–35.7)
MCHC RBC AUTO-ENTMCNC: 32 G/DL (ref 31.5–35.7)
MCHC RBC AUTO-ENTMCNC: 32.1 G/DL (ref 31.5–35.7)
MCHC RBC AUTO-ENTMCNC: 32.2 G/DL (ref 31.5–35.7)
MCHC RBC AUTO-ENTMCNC: 34.1 G/DL (ref 31.5–35.7)
MCHC RBC AUTO-ENTMCNC: 34.5 G/DL (ref 31.5–35.7)
MCV RBC AUTO: 104.9 FL (ref 79–97)
MCV RBC AUTO: 91.5 FL (ref 79–97)
MCV RBC AUTO: 91.7 FL (ref 79–97)
MCV RBC AUTO: 98 FL (ref 79–97)
MCV RBC AUTO: 98.3 FL (ref 79–97)
MCV RBC AUTO: 98.6 FL (ref 79–97)
METHGB BLD QL: 0.2 % (ref 0–1.5)
MODALITY: ABNORMAL
MONOCYTES # BLD AUTO: 0.48 10*3/MM3 (ref 0.1–0.9)
MONOCYTES # BLD AUTO: 0.5 10*3/MM3 (ref 0.1–0.9)
MONOCYTES # BLD AUTO: 0.81 10*3/MM3 (ref 0.1–0.9)
MONOCYTES # BLD AUTO: 0.89 10*3/MM3 (ref 0.1–0.9)
MONOCYTES NFR BLD AUTO: 4.3 % (ref 5–12)
MONOCYTES NFR BLD AUTO: 5.9 % (ref 5–12)
MONOCYTES NFR BLD AUTO: 5.9 % (ref 5–12)
NEUTROPHILS # BLD AUTO: 14.63 10*3/MM3 (ref 1.7–7)
NEUTROPHILS NFR BLD AUTO: 13.14 10*3/MM3 (ref 1.7–7)
NEUTROPHILS NFR BLD AUTO: 16.88 10*3/MM3 (ref 1.7–7)
NEUTROPHILS NFR BLD AUTO: 5.58 10*3/MM3 (ref 1.7–7)
NEUTROPHILS NFR BLD AUTO: 68.7 % (ref 42.7–76)
NEUTROPHILS NFR BLD AUTO: 87.4 % (ref 42.7–76)
NEUTROPHILS NFR BLD AUTO: 89.8 % (ref 42.7–76)
NEUTROPHILS NFR BLD MANUAL: 79 % (ref 42.7–76)
NEUTS BAND NFR BLD MANUAL: 9 % (ref 0–5)
NITRITE UR QL STRIP: NEGATIVE
NOTE: ABNORMAL
NRBC BLD AUTO-RTO: 0 /100 WBC (ref 0–0.2)
NT-PROBNP SERPL-MCNC: 360.1 PG/ML (ref 0–1800)
NT-PROBNP SERPL-MCNC: 8004 PG/ML (ref 0–1800)
OXYHGB MFR BLDV: 90.1 % (ref 94–99)
PAW @ PEAK INSP FLOW SETTING VENT: 0 CMH2O
PCO2 BLDA: 34.6 MM HG (ref 35–45)
PCO2 BLDA: 42.2 MM HG (ref 35–45)
PCO2 TEMP ADJ BLD: 42.2 MM HG (ref 35–48)
PH BLDA: 7.37 PH UNITS (ref 7.35–7.45)
PH BLDA: 7.43 PH UNITS (ref 7.35–7.6)
PH UR STRIP.AUTO: 7 [PH] (ref 5–8)
PH, TEMP CORRECTED: 7.37 PH UNITS
PLAT MORPH BLD: NORMAL
PLATELET # BLD AUTO: 148 10*3/MM3 (ref 140–450)
PLATELET # BLD AUTO: 176 10*3/MM3 (ref 140–450)
PLATELET # BLD AUTO: 179 10*3/MM3 (ref 140–450)
PLATELET # BLD AUTO: 223 10*3/MM3 (ref 140–450)
PLATELET # BLD AUTO: 225 10*3/MM3 (ref 140–450)
PLATELET # BLD AUTO: 237 10*3/MM3 (ref 140–450)
PMV BLD AUTO: 10.1 FL (ref 6–12)
PMV BLD AUTO: 10.2 FL (ref 6–12)
PMV BLD AUTO: 10.2 FL (ref 6–12)
PMV BLD AUTO: 10.4 FL (ref 6–12)
PMV BLD AUTO: 10.6 FL (ref 6–12)
PMV BLD AUTO: 9.9 FL (ref 6–12)
PO2 BLDA: 39 MMHG (ref 80–105)
PO2 BLDA: 59.2 MM HG (ref 83–108)
PO2 TEMP ADJ BLD: 59.2 MM HG (ref 83–108)
POTASSIUM BLDA-SCNC: 3.8 MMOL/L (ref 3.5–4.9)
POTASSIUM SERPL-SCNC: 3.2 MMOL/L (ref 3.5–5.2)
POTASSIUM SERPL-SCNC: 3.2 MMOL/L (ref 3.5–5.2)
POTASSIUM SERPL-SCNC: 3.4 MMOL/L (ref 3.5–5.2)
POTASSIUM SERPL-SCNC: 3.9 MMOL/L (ref 3.5–5.2)
POTASSIUM SERPL-SCNC: 4 MMOL/L (ref 3.5–5.2)
POTASSIUM SERPL-SCNC: 4.2 MMOL/L (ref 3.5–5.2)
POTASSIUM SERPL-SCNC: 4.6 MMOL/L (ref 3.5–5.2)
PROCALCITONIN SERPL-MCNC: 0.08 NG/ML (ref 0–0.25)
PROCALCITONIN SERPL-MCNC: 2.65 NG/ML (ref 0–0.25)
PROT SERPL-MCNC: 7.1 G/DL (ref 6–8.5)
PROT SERPL-MCNC: 7.2 G/DL (ref 6–8.5)
PROT SERPL-MCNC: 7.5 G/DL (ref 6–8.5)
PROT UR QL STRIP: ABNORMAL
QT INTERVAL: 394 MS
QTC INTERVAL: 439 MS
RBC # BLD AUTO: 4.25 10*6/MM3 (ref 4.14–5.8)
RBC # BLD AUTO: 4.49 10*6/MM3 (ref 4.14–5.8)
RBC # BLD AUTO: 4.75 10*6/MM3 (ref 4.14–5.8)
RBC # BLD AUTO: 4.97 10*6/MM3 (ref 4.14–5.8)
RBC # BLD AUTO: 5.43 10*6/MM3 (ref 4.14–5.8)
RBC # BLD AUTO: 5.54 10*6/MM3 (ref 4.14–5.8)
RBC # UR: ABNORMAL /HPF
RBC MORPH BLD: NORMAL
REF LAB TEST METHOD: ABNORMAL
SAO2 % BLDA: 76 % (ref 95–98)
SARS-COV-2 RNA RESP QL NAA+PROBE: NOT DETECTED
SCAN SLIDE: NORMAL
SODIUM BLD-SCNC: 143 MMOL/L (ref 138–146)
SODIUM SERPL-SCNC: 139 MMOL/L (ref 136–145)
SODIUM SERPL-SCNC: 139 MMOL/L (ref 136–145)
SODIUM SERPL-SCNC: 140 MMOL/L (ref 136–145)
SODIUM SERPL-SCNC: 140 MMOL/L (ref 136–145)
SODIUM SERPL-SCNC: 149 MMOL/L (ref 136–145)
SODIUM SERPL-SCNC: 153 MMOL/L (ref 136–145)
SP GR UR STRIP: 1.03 (ref 1–1.03)
SQUAMOUS #/AREA URNS HPF: ABNORMAL /HPF
TOTAL RATE: 0 BREATHS/MINUTE
TROPONIN T SERPL-MCNC: <0.01 NG/ML (ref 0–0.03)
TSH SERPL DL<=0.05 MIU/L-ACNC: 3.95 UIU/ML (ref 0.27–4.2)
UROBILINOGEN UR QL STRIP: ABNORMAL
VARIANT LYMPHS NFR BLD MANUAL: 3 % (ref 0–5)
WBC # BLD AUTO: 14.88 10*3/MM3 (ref 3.4–10.8)
WBC # BLD AUTO: 15.04 10*3/MM3 (ref 3.4–10.8)
WBC # BLD AUTO: 16.62 10*3/MM3 (ref 3.4–10.8)
WBC # BLD AUTO: 17.71 10*3/MM3 (ref 3.4–10.8)
WBC # BLD AUTO: 18.79 10*3/MM3 (ref 3.4–10.8)
WBC # BLD AUTO: 8.13 10*3/MM3 (ref 3.4–10.8)
WBC MORPH BLD: NORMAL
WBC UR QL AUTO: ABNORMAL /HPF
WHOLE BLOOD HOLD SPECIMEN: NORMAL

## 2021-01-01 PROCEDURE — 99233 SBSQ HOSP IP/OBS HIGH 50: CPT | Performed by: INTERNAL MEDICINE

## 2021-01-01 PROCEDURE — 99291 CRITICAL CARE FIRST HOUR: CPT | Performed by: INTERNAL MEDICINE

## 2021-01-01 PROCEDURE — 25010000002 MORPHINE PER 10 MG: Performed by: NURSE PRACTITIONER

## 2021-01-01 PROCEDURE — 85014 HEMATOCRIT: CPT

## 2021-01-01 PROCEDURE — 83050 HGB METHEMOGLOBIN QUAN: CPT

## 2021-01-01 PROCEDURE — 83880 ASSAY OF NATRIURETIC PEPTIDE: CPT | Performed by: NURSE PRACTITIONER

## 2021-01-01 PROCEDURE — 94799 UNLISTED PULMONARY SVC/PX: CPT

## 2021-01-01 PROCEDURE — 84145 PROCALCITONIN (PCT): CPT | Performed by: NURSE PRACTITIONER

## 2021-01-01 PROCEDURE — 71045 X-RAY EXAM CHEST 1 VIEW: CPT

## 2021-01-01 PROCEDURE — 80053 COMPREHEN METABOLIC PANEL: CPT | Performed by: INTERNAL MEDICINE

## 2021-01-01 PROCEDURE — 63710000001 INSULIN LISPRO (HUMAN) PER 5 UNITS: Performed by: INTERNAL MEDICINE

## 2021-01-01 PROCEDURE — 25010000002 METHYLNALTREXONE 12 MG/0.6ML SOLUTION: Performed by: SURGERY

## 2021-01-01 PROCEDURE — 83605 ASSAY OF LACTIC ACID: CPT | Performed by: NURSE PRACTITIONER

## 2021-01-01 PROCEDURE — A9577 INJ MULTIHANCE: HCPCS | Performed by: INTERNAL MEDICINE

## 2021-01-01 PROCEDURE — 25010000002 LEVETIRACETAM IN NACL 0.82% 500 MG/100ML SOLUTION: Performed by: PSYCHIATRY & NEUROLOGY

## 2021-01-01 PROCEDURE — 70450 CT HEAD/BRAIN W/O DYE: CPT

## 2021-01-01 PROCEDURE — 95819 EEG AWAKE AND ASLEEP: CPT

## 2021-01-01 PROCEDURE — 80048 BASIC METABOLIC PNL TOTAL CA: CPT | Performed by: INTERNAL MEDICINE

## 2021-01-01 PROCEDURE — 25010000002 ONDANSETRON PER 1 MG: Performed by: EMERGENCY MEDICINE

## 2021-01-01 PROCEDURE — 82947 ASSAY GLUCOSE BLOOD QUANT: CPT

## 2021-01-01 PROCEDURE — 25010000002 METOCLOPRAMIDE PER 10 MG: Performed by: SURGERY

## 2021-01-01 PROCEDURE — 25010000002 HEPARIN (PORCINE) PER 1000 UNITS: Performed by: INTERNAL MEDICINE

## 2021-01-01 PROCEDURE — 85027 COMPLETE CBC AUTOMATED: CPT | Performed by: INTERNAL MEDICINE

## 2021-01-01 PROCEDURE — 25010000002 PIPERACILLIN SOD-TAZOBACTAM PER 1 G: Performed by: INTERNAL MEDICINE

## 2021-01-01 PROCEDURE — 85007 BL SMEAR W/DIFF WBC COUNT: CPT | Performed by: NURSE PRACTITIONER

## 2021-01-01 PROCEDURE — 84145 PROCALCITONIN (PCT): CPT | Performed by: INTERNAL MEDICINE

## 2021-01-01 PROCEDURE — 93005 ELECTROCARDIOGRAM TRACING: CPT | Performed by: INTERNAL MEDICINE

## 2021-01-01 PROCEDURE — 81001 URINALYSIS AUTO W/SCOPE: CPT | Performed by: INTERNAL MEDICINE

## 2021-01-01 PROCEDURE — 85007 BL SMEAR W/DIFF WBC COUNT: CPT | Performed by: INTERNAL MEDICINE

## 2021-01-01 PROCEDURE — 25010000002 VANCOMYCIN 10 G RECONSTITUTED SOLUTION

## 2021-01-01 PROCEDURE — 99223 1ST HOSP IP/OBS HIGH 75: CPT | Performed by: NURSE PRACTITIONER

## 2021-01-01 PROCEDURE — 83735 ASSAY OF MAGNESIUM: CPT | Performed by: INTERNAL MEDICINE

## 2021-01-01 PROCEDURE — 84484 ASSAY OF TROPONIN QUANT: CPT | Performed by: EMERGENCY MEDICINE

## 2021-01-01 PROCEDURE — U0005 INFEC AGEN DETEC AMPLI PROBE: HCPCS | Performed by: INTERNAL MEDICINE

## 2021-01-01 PROCEDURE — 99232 SBSQ HOSP IP/OBS MODERATE 35: CPT | Performed by: PSYCHIATRY & NEUROLOGY

## 2021-01-01 PROCEDURE — 0 IOPAMIDOL PER 1 ML: Performed by: EMERGENCY MEDICINE

## 2021-01-01 PROCEDURE — 82803 BLOOD GASES ANY COMBINATION: CPT

## 2021-01-01 PROCEDURE — 82330 ASSAY OF CALCIUM: CPT

## 2021-01-01 PROCEDURE — 25010000002 HALOPERIDOL LACTATE PER 5 MG: Performed by: NURSE PRACTITIONER

## 2021-01-01 PROCEDURE — 99233 SBSQ HOSP IP/OBS HIGH 50: CPT | Performed by: PSYCHIATRY & NEUROLOGY

## 2021-01-01 PROCEDURE — 25010000002 METOCLOPRAMIDE PER 10 MG: Performed by: NURSE PRACTITIONER

## 2021-01-01 PROCEDURE — 36600 WITHDRAWAL OF ARTERIAL BLOOD: CPT

## 2021-01-01 PROCEDURE — 25010000002 LEVETRIRACETAM PER 10 MG: Performed by: NURSE PRACTITIONER

## 2021-01-01 PROCEDURE — U0003 INFECTIOUS AGENT DETECTION BY NUCLEIC ACID (DNA OR RNA); SEVERE ACUTE RESPIRATORY SYNDROME CORONAVIRUS 2 (SARS-COV-2) (CORONAVIRUS DISEASE [COVID-19]), AMPLIFIED PROBE TECHNIQUE, MAKING USE OF HIGH THROUGHPUT TECHNOLOGIES AS DESCRIBED BY CMS-2020-01-R: HCPCS | Performed by: INTERNAL MEDICINE

## 2021-01-01 PROCEDURE — 95714 VEEG EA 12-26 HR UNMNTR: CPT

## 2021-01-01 PROCEDURE — 70496 CT ANGIOGRAPHY HEAD: CPT

## 2021-01-01 PROCEDURE — 99239 HOSP IP/OBS DSCHRG MGMT >30: CPT | Performed by: INTERNAL MEDICINE

## 2021-01-01 PROCEDURE — 70551 MRI BRAIN STEM W/O DYE: CPT

## 2021-01-01 PROCEDURE — 25010000002 ONDANSETRON PER 1 MG

## 2021-01-01 PROCEDURE — 71250 CT THORAX DX C-: CPT

## 2021-01-01 PROCEDURE — 25010000003 POTASSIUM CHLORIDE 10 MEQ/100ML SOLUTION: Performed by: INTERNAL MEDICINE

## 2021-01-01 PROCEDURE — 82565 ASSAY OF CREATININE: CPT

## 2021-01-01 PROCEDURE — 0 GADOBENATE DIMEGLUMINE 529 MG/ML SOLUTION: Performed by: INTERNAL MEDICINE

## 2021-01-01 PROCEDURE — 87040 BLOOD CULTURE FOR BACTERIA: CPT | Performed by: INTERNAL MEDICINE

## 2021-01-01 PROCEDURE — 25010000002 ENOXAPARIN PER 10 MG: Performed by: INTERNAL MEDICINE

## 2021-01-01 PROCEDURE — 93306 TTE W/DOPPLER COMPLETE: CPT

## 2021-01-01 PROCEDURE — 85379 FIBRIN DEGRADATION QUANT: CPT | Performed by: INTERNAL MEDICINE

## 2021-01-01 PROCEDURE — 25010000002 LEVETIRACETAM IN NACL 0.82% 500 MG/100ML SOLUTION: Performed by: NURSE PRACTITIONER

## 2021-01-01 PROCEDURE — 85025 COMPLETE CBC W/AUTO DIFF WBC: CPT | Performed by: INTERNAL MEDICINE

## 2021-01-01 PROCEDURE — 25010000002 LORAZEPAM PER 2 MG: Performed by: PSYCHIATRY & NEUROLOGY

## 2021-01-01 PROCEDURE — 84132 ASSAY OF SERUM POTASSIUM: CPT

## 2021-01-01 PROCEDURE — 82962 GLUCOSE BLOOD TEST: CPT

## 2021-01-01 PROCEDURE — 25010000003 LEVETIRACETAM IN NACL 0.75% 1000 MG/100ML SOLUTION: Performed by: EMERGENCY MEDICINE

## 2021-01-01 PROCEDURE — 84132 ASSAY OF SERUM POTASSIUM: CPT | Performed by: INTERNAL MEDICINE

## 2021-01-01 PROCEDURE — 85025 COMPLETE CBC W/AUTO DIFF WBC: CPT | Performed by: NURSE PRACTITIONER

## 2021-01-01 PROCEDURE — 93306 TTE W/DOPPLER COMPLETE: CPT | Performed by: INTERNAL MEDICINE

## 2021-01-01 PROCEDURE — 70498 CT ANGIOGRAPHY NECK: CPT

## 2021-01-01 PROCEDURE — 4A03X5D MEASUREMENT OF ARTERIAL FLOW, INTRACRANIAL, EXTERNAL APPROACH: ICD-10-PCS | Performed by: EMERGENCY MEDICINE

## 2021-01-01 PROCEDURE — 80177 DRUG SCRN QUAN LEVETIRACETAM: CPT | Performed by: EMERGENCY MEDICINE

## 2021-01-01 PROCEDURE — 85025 COMPLETE CBC W/AUTO DIFF WBC: CPT | Performed by: EMERGENCY MEDICINE

## 2021-01-01 PROCEDURE — 92610 EVALUATE SWALLOWING FUNCTION: CPT

## 2021-01-01 PROCEDURE — 84295 ASSAY OF SERUM SODIUM: CPT

## 2021-01-01 PROCEDURE — 99285 EMERGENCY DEPT VISIT HI MDM: CPT

## 2021-01-01 PROCEDURE — 83880 ASSAY OF NATRIURETIC PEPTIDE: CPT | Performed by: EMERGENCY MEDICINE

## 2021-01-01 PROCEDURE — 85007 BL SMEAR W/DIFF WBC COUNT: CPT | Performed by: EMERGENCY MEDICINE

## 2021-01-01 PROCEDURE — 82805 BLOOD GASES W/O2 SATURATION: CPT

## 2021-01-01 PROCEDURE — 93005 ELECTROCARDIOGRAM TRACING: CPT | Performed by: EMERGENCY MEDICINE

## 2021-01-01 PROCEDURE — 71275 CT ANGIOGRAPHY CHEST: CPT

## 2021-01-01 PROCEDURE — 0 IOPAMIDOL PER 1 ML: Performed by: INTERNAL MEDICINE

## 2021-01-01 PROCEDURE — 99442 PR PHYS/QHP TELEPHONE EVALUATION 11-20 MIN: CPT | Performed by: PSYCHIATRY & NEUROLOGY

## 2021-01-01 PROCEDURE — 93010 ELECTROCARDIOGRAM REPORT: CPT | Performed by: INTERNAL MEDICINE

## 2021-01-01 PROCEDURE — 74018 RADEX ABDOMEN 1 VIEW: CPT

## 2021-01-01 PROCEDURE — 84443 ASSAY THYROID STIM HORMONE: CPT | Performed by: EMERGENCY MEDICINE

## 2021-01-01 PROCEDURE — 82375 ASSAY CARBOXYHB QUANT: CPT

## 2021-01-01 PROCEDURE — 25010000002 LORAZEPAM PER 2 MG: Performed by: NURSE PRACTITIONER

## 2021-01-01 PROCEDURE — 74176 CT ABD & PELVIS W/O CONTRAST: CPT

## 2021-01-01 PROCEDURE — 70553 MRI BRAIN STEM W/O & W/DYE: CPT

## 2021-01-01 PROCEDURE — 80053 COMPREHEN METABOLIC PANEL: CPT | Performed by: EMERGENCY MEDICINE

## 2021-01-01 PROCEDURE — 94640 AIRWAY INHALATION TREATMENT: CPT

## 2021-01-01 PROCEDURE — 99223 1ST HOSP IP/OBS HIGH 75: CPT | Performed by: INTERNAL MEDICINE

## 2021-01-01 PROCEDURE — 25010000003 LEVETIRACETAM IN NACL 0.75% 1000 MG/100ML SOLUTION: Performed by: PSYCHIATRY & NEUROLOGY

## 2021-01-01 PROCEDURE — 92523 SPEECH SOUND LANG COMPREHEN: CPT

## 2021-01-01 PROCEDURE — 25010000002 FUROSEMIDE PER 20 MG: Performed by: INTERNAL MEDICINE

## 2021-01-01 PROCEDURE — 0042T HC CT CEREBRAL PERFUSION W/WO CONTRAST: CPT

## 2021-01-01 RX ORDER — DEXTROSE MONOHYDRATE 25 G/50ML
25 INJECTION, SOLUTION INTRAVENOUS
Status: CANCELLED | OUTPATIENT
Start: 2021-01-01

## 2021-01-01 RX ORDER — SCOLOPAMINE TRANSDERMAL SYSTEM 1 MG/1
1 PATCH, EXTENDED RELEASE TRANSDERMAL
Status: DISCONTINUED | OUTPATIENT
Start: 2021-01-01 | End: 2021-01-01 | Stop reason: HOSPADM

## 2021-01-01 RX ORDER — SODIUM CHLORIDE, SODIUM LACTATE, POTASSIUM CHLORIDE, CALCIUM CHLORIDE 600; 310; 30; 20 MG/100ML; MG/100ML; MG/100ML; MG/100ML
100 INJECTION, SOLUTION INTRAVENOUS CONTINUOUS
Status: DISCONTINUED | OUTPATIENT
Start: 2021-01-01 | End: 2021-01-01

## 2021-01-01 RX ORDER — SODIUM CHLORIDE 0.9 % (FLUSH) 0.9 %
10 SYRINGE (ML) INJECTION AS NEEDED
Status: DISCONTINUED | OUTPATIENT
Start: 2021-01-01 | End: 2021-01-01 | Stop reason: HOSPADM

## 2021-01-01 RX ORDER — LORAZEPAM 2 MG/ML
0.5 INJECTION INTRAMUSCULAR EVERY 6 HOURS PRN
Status: CANCELLED | OUTPATIENT
Start: 2021-01-01 | End: 2021-07-18

## 2021-01-01 RX ORDER — LORAZEPAM 2 MG/ML
0.5 INJECTION INTRAMUSCULAR EVERY 4 HOURS PRN
Status: DISCONTINUED | OUTPATIENT
Start: 2021-01-01 | End: 2021-01-01 | Stop reason: HOSPADM

## 2021-01-01 RX ORDER — ONDANSETRON 2 MG/ML
INJECTION INTRAMUSCULAR; INTRAVENOUS
Status: COMPLETED
Start: 2021-01-01 | End: 2021-01-01

## 2021-01-01 RX ORDER — MAGNESIUM SULFATE HEPTAHYDRATE 40 MG/ML
2 INJECTION, SOLUTION INTRAVENOUS AS NEEDED
Status: CANCELLED | OUTPATIENT
Start: 2021-01-01

## 2021-01-01 RX ORDER — SODIUM CHLORIDE 0.9 % (FLUSH) 0.9 %
10 SYRINGE (ML) INJECTION EVERY 12 HOURS SCHEDULED
Status: DISCONTINUED | OUTPATIENT
Start: 2021-01-01 | End: 2021-01-01 | Stop reason: HOSPADM

## 2021-01-01 RX ORDER — DOCUSATE SODIUM 50 MG/5 ML
100 LIQUID (ML) ORAL 2 TIMES DAILY
Status: DISCONTINUED | OUTPATIENT
Start: 2021-01-01 | End: 2021-01-01 | Stop reason: HOSPADM

## 2021-01-01 RX ORDER — MAGNESIUM SULFATE HEPTAHYDRATE 40 MG/ML
2 INJECTION, SOLUTION INTRAVENOUS AS NEEDED
Status: DISCONTINUED | OUTPATIENT
Start: 2021-01-01 | End: 2021-01-01 | Stop reason: HOSPADM

## 2021-01-01 RX ORDER — LEVETIRACETAM 5 MG/ML
500 INJECTION INTRAVASCULAR EVERY 12 HOURS SCHEDULED
Status: DISCONTINUED | OUTPATIENT
Start: 2021-01-01 | End: 2021-01-01 | Stop reason: HOSPADM

## 2021-01-01 RX ORDER — GLIMEPIRIDE 2 MG/1
1 TABLET ORAL 2 TIMES DAILY
COMMUNITY

## 2021-01-01 RX ORDER — LEVETIRACETAM 5 MG/ML
500 INJECTION INTRAVASCULAR EVERY 12 HOURS SCHEDULED
Status: CANCELLED | OUTPATIENT
Start: 2021-01-01

## 2021-01-01 RX ORDER — LACOSAMIDE 10 MG/ML
50 INJECTION, SOLUTION INTRAVENOUS EVERY 12 HOURS SCHEDULED
Status: DISCONTINUED | OUTPATIENT
Start: 2021-01-01 | End: 2021-01-01 | Stop reason: HOSPADM

## 2021-01-01 RX ORDER — HALOPERIDOL 5 MG/ML
1 INJECTION INTRAMUSCULAR EVERY 4 HOURS PRN
Status: DISCONTINUED | OUTPATIENT
Start: 2021-01-01 | End: 2021-01-01 | Stop reason: HOSPADM

## 2021-01-01 RX ORDER — ONDANSETRON 4 MG/1
4 TABLET, FILM COATED ORAL EVERY 6 HOURS PRN
Status: DISCONTINUED | OUTPATIENT
Start: 2021-01-01 | End: 2021-01-01 | Stop reason: HOSPADM

## 2021-01-01 RX ORDER — HEPARIN SODIUM 5000 [USP'U]/ML
5000 INJECTION, SOLUTION INTRAVENOUS; SUBCUTANEOUS EVERY 8 HOURS SCHEDULED
Status: DISCONTINUED | OUTPATIENT
Start: 2021-01-01 | End: 2021-01-01 | Stop reason: HOSPADM

## 2021-01-01 RX ORDER — METOCLOPRAMIDE HYDROCHLORIDE 5 MG/ML
5 INJECTION INTRAMUSCULAR; INTRAVENOUS EVERY 6 HOURS SCHEDULED
Status: DISCONTINUED | OUTPATIENT
Start: 2021-01-01 | End: 2021-01-01 | Stop reason: HOSPADM

## 2021-01-01 RX ORDER — MAG HYDROX/ALUMINUM HYD/SIMETH 400-400-40
1 SUSPENSION, ORAL (FINAL DOSE FORM) ORAL DAILY PRN
Status: DISCONTINUED | OUTPATIENT
Start: 2021-01-01 | End: 2021-01-01 | Stop reason: HOSPADM

## 2021-01-01 RX ORDER — MORPHINE SULFATE 2 MG/ML
1 INJECTION, SOLUTION INTRAMUSCULAR; INTRAVENOUS
Status: DISCONTINUED | OUTPATIENT
Start: 2021-01-01 | End: 2021-01-01

## 2021-01-01 RX ORDER — BISACODYL 10 MG
10 SUPPOSITORY, RECTAL RECTAL 2 TIMES DAILY
Status: DISCONTINUED | OUTPATIENT
Start: 2021-01-01 | End: 2021-01-01 | Stop reason: HOSPADM

## 2021-01-01 RX ORDER — POTASSIUM CHLORIDE 7.45 MG/ML
10 INJECTION INTRAVENOUS
Status: CANCELLED | OUTPATIENT
Start: 2021-01-01

## 2021-01-01 RX ORDER — DOCUSATE SODIUM 50 MG/5 ML
100 LIQUID (ML) ORAL 2 TIMES DAILY
Status: CANCELLED | OUTPATIENT
Start: 2021-01-01

## 2021-01-01 RX ORDER — MAGNESIUM SULFATE HEPTAHYDRATE 40 MG/ML
4 INJECTION, SOLUTION INTRAVENOUS AS NEEDED
Status: CANCELLED | OUTPATIENT
Start: 2021-01-01

## 2021-01-01 RX ORDER — IPRATROPIUM BROMIDE AND ALBUTEROL SULFATE 2.5; .5 MG/3ML; MG/3ML
3 SOLUTION RESPIRATORY (INHALATION) EVERY 4 HOURS PRN
Status: CANCELLED | OUTPATIENT
Start: 2021-01-01

## 2021-01-01 RX ORDER — GLIMEPIRIDE 2 MG/1
1 TABLET ORAL EVERY 12 HOURS SCHEDULED
Status: DISCONTINUED | OUTPATIENT
Start: 2021-01-01 | End: 2021-01-01 | Stop reason: HOSPADM

## 2021-01-01 RX ORDER — SODIUM CHLORIDE 0.9 % (FLUSH) 0.9 %
10 SYRINGE (ML) INJECTION AS NEEDED
Status: CANCELLED | OUTPATIENT
Start: 2021-01-01

## 2021-01-01 RX ORDER — MORPHINE SULFATE 2 MG/ML
2 INJECTION, SOLUTION INTRAMUSCULAR; INTRAVENOUS
Status: CANCELLED | OUTPATIENT
Start: 2021-01-01

## 2021-01-01 RX ORDER — ACETAMINOPHEN 325 MG/1
650 TABLET ORAL EVERY 4 HOURS PRN
Status: DISCONTINUED | OUTPATIENT
Start: 2021-01-01 | End: 2021-01-01 | Stop reason: SDUPTHER

## 2021-01-01 RX ORDER — ONDANSETRON 2 MG/ML
4 INJECTION INTRAMUSCULAR; INTRAVENOUS EVERY 6 HOURS PRN
Status: DISCONTINUED | OUTPATIENT
Start: 2021-01-01 | End: 2021-01-01 | Stop reason: HOSPADM

## 2021-01-01 RX ORDER — GLYCOPYRROLATE 0.2 MG/ML
0.2 INJECTION INTRAMUSCULAR; INTRAVENOUS EVERY 6 HOURS PRN
Status: DISCONTINUED | OUTPATIENT
Start: 2021-01-01 | End: 2021-01-01 | Stop reason: HOSPADM

## 2021-01-01 RX ORDER — POTASSIUM CHLORIDE 1.5 G/1.77G
40 POWDER, FOR SOLUTION ORAL AS NEEDED
Status: CANCELLED | OUTPATIENT
Start: 2021-01-01

## 2021-01-01 RX ORDER — LEVETIRACETAM 10 MG/ML
1000 INJECTION INTRAVASCULAR ONCE
Status: COMPLETED | OUTPATIENT
Start: 2021-01-01 | End: 2021-01-01

## 2021-01-01 RX ORDER — MAG HYDROX/ALUMINUM HYD/SIMETH 400-400-40
1 SUSPENSION, ORAL (FINAL DOSE FORM) ORAL DAILY PRN
Status: CANCELLED | OUTPATIENT
Start: 2021-01-01

## 2021-01-01 RX ORDER — DEXTROSE MONOHYDRATE 25 G/50ML
25 INJECTION, SOLUTION INTRAVENOUS
Status: DISCONTINUED | OUTPATIENT
Start: 2021-01-01 | End: 2021-01-01 | Stop reason: HOSPADM

## 2021-01-01 RX ORDER — LABETALOL HYDROCHLORIDE 5 MG/ML
20 INJECTION, SOLUTION INTRAVENOUS
Status: CANCELLED | OUTPATIENT
Start: 2021-01-01

## 2021-01-01 RX ORDER — LORAZEPAM 2 MG/ML
2 INJECTION INTRAMUSCULAR
Status: DISCONTINUED | OUTPATIENT
Start: 2021-01-01 | End: 2021-01-01 | Stop reason: HOSPADM

## 2021-01-01 RX ORDER — ACETAMINOPHEN 650 MG/1
650 SUPPOSITORY RECTAL EVERY 4 HOURS PRN
Status: DISCONTINUED | OUTPATIENT
Start: 2021-01-01 | End: 2021-01-01 | Stop reason: HOSPADM

## 2021-01-01 RX ORDER — LORAZEPAM 2 MG/ML
2 INJECTION INTRAMUSCULAR ONCE
Status: COMPLETED | OUTPATIENT
Start: 2021-01-01 | End: 2021-01-01

## 2021-01-01 RX ORDER — MORPHINE SULFATE 2 MG/ML
2 INJECTION, SOLUTION INTRAMUSCULAR; INTRAVENOUS EVERY 4 HOURS
Status: DISCONTINUED | OUTPATIENT
Start: 2021-01-01 | End: 2021-01-01 | Stop reason: HOSPADM

## 2021-01-01 RX ORDER — LACOSAMIDE 10 MG/ML
200 INJECTION, SOLUTION INTRAVENOUS ONCE
Status: COMPLETED | OUTPATIENT
Start: 2021-01-01 | End: 2021-01-01

## 2021-01-01 RX ORDER — FLUTICASONE PROPIONATE 50 MCG
1 SPRAY, SUSPENSION (ML) NASAL DAILY
Status: DISCONTINUED | OUTPATIENT
Start: 2021-01-01 | End: 2021-01-01 | Stop reason: HOSPADM

## 2021-01-01 RX ORDER — POLYVINYL ALCOHOL 14 MG/ML
1 SOLUTION/ DROPS OPHTHALMIC
Status: CANCELLED | OUTPATIENT
Start: 2021-01-01

## 2021-01-01 RX ORDER — METOCLOPRAMIDE HYDROCHLORIDE 5 MG/ML
5 INJECTION INTRAMUSCULAR; INTRAVENOUS EVERY 6 HOURS SCHEDULED
Status: CANCELLED | OUTPATIENT
Start: 2021-01-01

## 2021-01-01 RX ORDER — BISACODYL 10 MG
10 SUPPOSITORY, RECTAL RECTAL 2 TIMES DAILY
Status: CANCELLED | OUTPATIENT
Start: 2021-01-01

## 2021-01-01 RX ORDER — LORAZEPAM 2 MG/ML
0.5 INJECTION INTRAMUSCULAR EVERY 6 HOURS PRN
Status: DISCONTINUED | OUTPATIENT
Start: 2021-01-01 | End: 2021-01-01 | Stop reason: HOSPADM

## 2021-01-01 RX ORDER — METOCLOPRAMIDE HYDROCHLORIDE 5 MG/ML
10 INJECTION INTRAMUSCULAR; INTRAVENOUS EVERY 6 HOURS SCHEDULED
Status: DISCONTINUED | OUTPATIENT
Start: 2021-01-01 | End: 2021-01-01

## 2021-01-01 RX ORDER — LEVETIRACETAM 250 MG/1
250 TABLET ORAL EVERY 12 HOURS SCHEDULED
Status: DISCONTINUED | OUTPATIENT
Start: 2021-01-01 | End: 2021-01-01

## 2021-01-01 RX ORDER — LACOSAMIDE 10 MG/ML
50 INJECTION, SOLUTION INTRAVENOUS EVERY 12 HOURS SCHEDULED
Status: CANCELLED | OUTPATIENT
Start: 2021-01-01

## 2021-01-01 RX ORDER — SODIUM CHLORIDE 450 MG/100ML
100 INJECTION, SOLUTION INTRAVENOUS CONTINUOUS
Status: DISCONTINUED | OUTPATIENT
Start: 2021-01-01 | End: 2021-01-01 | Stop reason: HOSPADM

## 2021-01-01 RX ORDER — POLYVINYL ALCOHOL 14 MG/ML
1 SOLUTION/ DROPS OPHTHALMIC
Status: DISCONTINUED | OUTPATIENT
Start: 2021-01-01 | End: 2021-01-01 | Stop reason: HOSPADM

## 2021-01-01 RX ORDER — NICOTINE POLACRILEX 4 MG
15 LOZENGE BUCCAL
Status: DISCONTINUED | OUTPATIENT
Start: 2021-01-01 | End: 2021-01-01 | Stop reason: HOSPADM

## 2021-01-01 RX ORDER — FUROSEMIDE 10 MG/ML
20 INJECTION INTRAMUSCULAR; INTRAVENOUS EVERY 6 HOURS PRN
Status: DISCONTINUED | OUTPATIENT
Start: 2021-01-01 | End: 2021-01-01 | Stop reason: HOSPADM

## 2021-01-01 RX ORDER — ACETAMINOPHEN 325 MG/1
650 TABLET ORAL EVERY 4 HOURS PRN
Status: CANCELLED | OUTPATIENT
Start: 2021-01-01

## 2021-01-01 RX ORDER — MAGNESIUM SULFATE 1 G/100ML
1 INJECTION INTRAVENOUS AS NEEDED
Status: DISCONTINUED | OUTPATIENT
Start: 2021-01-01 | End: 2021-01-01 | Stop reason: HOSPADM

## 2021-01-01 RX ORDER — ONDANSETRON 4 MG/1
4 TABLET, FILM COATED ORAL EVERY 6 HOURS PRN
Status: CANCELLED | OUTPATIENT
Start: 2021-01-01

## 2021-01-01 RX ORDER — BISACODYL 5 MG/1
10 TABLET, DELAYED RELEASE ORAL DAILY
Status: CANCELLED | OUTPATIENT
Start: 2021-01-01

## 2021-01-01 RX ORDER — ACETAMINOPHEN 160 MG/5ML
650 SOLUTION ORAL EVERY 4 HOURS PRN
Status: DISCONTINUED | OUTPATIENT
Start: 2021-01-01 | End: 2021-01-01 | Stop reason: HOSPADM

## 2021-01-01 RX ORDER — POTASSIUM CHLORIDE 750 MG/1
40 CAPSULE, EXTENDED RELEASE ORAL AS NEEDED
Status: CANCELLED | OUTPATIENT
Start: 2021-01-01

## 2021-01-01 RX ORDER — FLUTICASONE PROPIONATE 50 MCG
1 SPRAY, SUSPENSION (ML) NASAL DAILY
Status: CANCELLED | OUTPATIENT
Start: 2021-01-01

## 2021-01-01 RX ORDER — IPRATROPIUM BROMIDE AND ALBUTEROL SULFATE 2.5; .5 MG/3ML; MG/3ML
3 SOLUTION RESPIRATORY (INHALATION) EVERY 4 HOURS PRN
Status: DISCONTINUED | OUTPATIENT
Start: 2021-01-01 | End: 2021-01-01 | Stop reason: HOSPADM

## 2021-01-01 RX ORDER — MAGNESIUM SULFATE 1 G/100ML
1 INJECTION INTRAVENOUS AS NEEDED
Status: CANCELLED | OUTPATIENT
Start: 2021-01-01

## 2021-01-01 RX ORDER — BISACODYL 5 MG/1
10 TABLET, DELAYED RELEASE ORAL DAILY
Status: DISCONTINUED | OUTPATIENT
Start: 2021-01-01 | End: 2021-01-01 | Stop reason: HOSPADM

## 2021-01-01 RX ORDER — POTASSIUM CHLORIDE 7.45 MG/ML
10 INJECTION INTRAVENOUS
Status: DISCONTINUED | OUTPATIENT
Start: 2021-01-01 | End: 2021-01-01 | Stop reason: HOSPADM

## 2021-01-01 RX ORDER — MAG HYDROX/ALUMINUM HYD/SIMETH 400-400-40
1 SUSPENSION, ORAL (FINAL DOSE FORM) ORAL ONCE
Status: COMPLETED | OUTPATIENT
Start: 2021-01-01 | End: 2021-01-01

## 2021-01-01 RX ORDER — KETOROLAC TROMETHAMINE 15 MG/ML
15 INJECTION, SOLUTION INTRAMUSCULAR; INTRAVENOUS EVERY 6 HOURS PRN
Status: DISCONTINUED | OUTPATIENT
Start: 2021-01-01 | End: 2021-01-01 | Stop reason: HOSPADM

## 2021-01-01 RX ORDER — HEPARIN SODIUM 5000 [USP'U]/ML
5000 INJECTION, SOLUTION INTRAVENOUS; SUBCUTANEOUS EVERY 8 HOURS SCHEDULED
Status: CANCELLED | OUTPATIENT
Start: 2021-01-01

## 2021-01-01 RX ORDER — MAGNESIUM SULFATE HEPTAHYDRATE 40 MG/ML
4 INJECTION, SOLUTION INTRAVENOUS AS NEEDED
Status: DISCONTINUED | OUTPATIENT
Start: 2021-01-01 | End: 2021-01-01 | Stop reason: HOSPADM

## 2021-01-01 RX ORDER — DOCUSATE SODIUM 100 MG/1
100 CAPSULE, LIQUID FILLED ORAL 2 TIMES DAILY
Status: DISCONTINUED | OUTPATIENT
Start: 2021-01-01 | End: 2021-01-01

## 2021-01-01 RX ORDER — SODIUM CHLORIDE 450 MG/100ML
100 INJECTION, SOLUTION INTRAVENOUS CONTINUOUS
Status: CANCELLED | OUTPATIENT
Start: 2021-01-01

## 2021-01-01 RX ORDER — POTASSIUM CHLORIDE 750 MG/1
40 CAPSULE, EXTENDED RELEASE ORAL AS NEEDED
Status: DISCONTINUED | OUTPATIENT
Start: 2021-01-01 | End: 2021-01-01 | Stop reason: HOSPADM

## 2021-01-01 RX ORDER — MORPHINE SULFATE 4 MG/ML
4 INJECTION, SOLUTION INTRAMUSCULAR; INTRAVENOUS
Status: DISCONTINUED | OUTPATIENT
Start: 2021-01-01 | End: 2021-01-01 | Stop reason: HOSPADM

## 2021-01-01 RX ORDER — ACETAMINOPHEN 650 MG/1
650 SUPPOSITORY RECTAL EVERY 4 HOURS PRN
Status: CANCELLED | OUTPATIENT
Start: 2021-01-01

## 2021-01-01 RX ORDER — BISACODYL 10 MG
10 SUPPOSITORY, RECTAL RECTAL DAILY PRN
Status: DISCONTINUED | OUTPATIENT
Start: 2021-01-01 | End: 2021-01-01 | Stop reason: HOSPADM

## 2021-01-01 RX ORDER — LATANOPROST 50 UG/ML
1 SOLUTION/ DROPS OPHTHALMIC NIGHTLY
Status: CANCELLED | OUTPATIENT
Start: 2021-01-01

## 2021-01-01 RX ORDER — GLIMEPIRIDE 2 MG/1
1 TABLET ORAL EVERY 12 HOURS SCHEDULED
Status: CANCELLED | OUTPATIENT
Start: 2021-01-01

## 2021-01-01 RX ORDER — SODIUM CHLORIDE 0.9 % (FLUSH) 0.9 %
10 SYRINGE (ML) INJECTION EVERY 12 HOURS SCHEDULED
Status: CANCELLED | OUTPATIENT
Start: 2021-01-01

## 2021-01-01 RX ORDER — LATANOPROST 50 UG/ML
1 SOLUTION/ DROPS OPHTHALMIC NIGHTLY
Status: DISCONTINUED | OUTPATIENT
Start: 2021-01-01 | End: 2021-01-01 | Stop reason: HOSPADM

## 2021-01-01 RX ORDER — IPRATROPIUM BROMIDE AND ALBUTEROL SULFATE 2.5; .5 MG/3ML; MG/3ML
3 SOLUTION RESPIRATORY (INHALATION)
Status: CANCELLED | OUTPATIENT
Start: 2021-01-01

## 2021-01-01 RX ORDER — MORPHINE SULFATE 2 MG/ML
2 INJECTION, SOLUTION INTRAMUSCULAR; INTRAVENOUS
Status: DISCONTINUED | OUTPATIENT
Start: 2021-01-01 | End: 2021-01-01 | Stop reason: HOSPADM

## 2021-01-01 RX ORDER — ONDANSETRON 2 MG/ML
4 INJECTION INTRAMUSCULAR; INTRAVENOUS ONCE
Status: COMPLETED | OUTPATIENT
Start: 2021-01-01 | End: 2021-01-01

## 2021-01-01 RX ORDER — FUROSEMIDE 10 MG/ML
40 INJECTION INTRAMUSCULAR; INTRAVENOUS ONCE
Status: COMPLETED | OUTPATIENT
Start: 2021-01-01 | End: 2021-01-01

## 2021-01-01 RX ORDER — METOPROLOL TARTRATE 5 MG/5ML
5 INJECTION INTRAVENOUS ONCE
Status: COMPLETED | OUTPATIENT
Start: 2021-01-01 | End: 2021-01-01

## 2021-01-01 RX ORDER — ACETAMINOPHEN 160 MG/5ML
650 SOLUTION ORAL EVERY 4 HOURS PRN
Status: CANCELLED | OUTPATIENT
Start: 2021-01-01

## 2021-01-01 RX ORDER — IPRATROPIUM BROMIDE AND ALBUTEROL SULFATE 2.5; .5 MG/3ML; MG/3ML
3 SOLUTION RESPIRATORY (INHALATION)
Status: DISCONTINUED | OUTPATIENT
Start: 2021-01-01 | End: 2021-01-01 | Stop reason: HOSPADM

## 2021-01-01 RX ORDER — LISINOPRIL 20 MG/1
20 TABLET ORAL
Status: DISCONTINUED | OUTPATIENT
Start: 2021-01-01 | End: 2021-01-01

## 2021-01-01 RX ORDER — NICOTINE POLACRILEX 4 MG
15 LOZENGE BUCCAL
Status: CANCELLED | OUTPATIENT
Start: 2021-01-01

## 2021-01-01 RX ORDER — POTASSIUM CHLORIDE 1.5 G/1.77G
40 POWDER, FOR SOLUTION ORAL AS NEEDED
Status: DISCONTINUED | OUTPATIENT
Start: 2021-01-01 | End: 2021-01-01 | Stop reason: HOSPADM

## 2021-01-01 RX ORDER — ACETAMINOPHEN 325 MG/1
650 TABLET ORAL EVERY 4 HOURS PRN
Status: DISCONTINUED | OUTPATIENT
Start: 2021-01-01 | End: 2021-01-01 | Stop reason: HOSPADM

## 2021-01-01 RX ORDER — LABETALOL HYDROCHLORIDE 5 MG/ML
20 INJECTION, SOLUTION INTRAVENOUS
Status: DISCONTINUED | OUTPATIENT
Start: 2021-01-01 | End: 2021-01-01 | Stop reason: HOSPADM

## 2021-01-01 RX ADMIN — LACOSAMIDE 50 MG: 10 INJECTION INTRAVENOUS at 12:13

## 2021-01-01 RX ADMIN — ACETAMINOPHEN 650 MG: 650 SUPPOSITORY RECTAL at 04:41

## 2021-01-01 RX ADMIN — LACOSAMIDE 50 MG: 10 INJECTION INTRAVENOUS at 20:51

## 2021-01-01 RX ADMIN — NICARDIPINE HYDROCHLORIDE 5 MG/HR: 0.1 INJECTION, SOLUTION INTRAVENOUS at 05:19

## 2021-01-01 RX ADMIN — HEPARIN SODIUM 5000 UNITS: 5000 INJECTION INTRAVENOUS; SUBCUTANEOUS at 22:01

## 2021-01-01 RX ADMIN — MORPHINE SULFATE 2 MG: 2 INJECTION, SOLUTION INTRAMUSCULAR; INTRAVENOUS at 01:34

## 2021-01-01 RX ADMIN — TIMOLOL MALEATE 1 DROP: 2.5 SOLUTION/ DROPS OPHTHALMIC at 20:10

## 2021-01-01 RX ADMIN — TIMOLOL MALEATE 1 DROP: 2.5 SOLUTION/ DROPS OPHTHALMIC at 20:15

## 2021-01-01 RX ADMIN — POTASSIUM CHLORIDE 10 MEQ: 7.46 INJECTION, SOLUTION INTRAVENOUS at 23:06

## 2021-01-01 RX ADMIN — SODIUM CHLORIDE, POTASSIUM CHLORIDE, SODIUM LACTATE AND CALCIUM CHLORIDE 100 ML/HR: 600; 310; 30; 20 INJECTION, SOLUTION INTRAVENOUS at 23:48

## 2021-01-01 RX ADMIN — MORPHINE SULFATE 2 MG: 2 INJECTION, SOLUTION INTRAMUSCULAR; INTRAVENOUS at 04:41

## 2021-01-01 RX ADMIN — SCOPALAMINE 1 PATCH: 1 PATCH, EXTENDED RELEASE TRANSDERMAL at 05:53

## 2021-01-01 RX ADMIN — NICARDIPINE HYDROCHLORIDE 5 MG/HR: 0.1 INJECTION, SOLUTION INTRAVENOUS at 00:43

## 2021-01-01 RX ADMIN — FUROSEMIDE 40 MG: 10 INJECTION INTRAMUSCULAR; INTRAVENOUS at 22:34

## 2021-01-01 RX ADMIN — TAZOBACTAM SODIUM AND PIPERACILLIN SODIUM 3.38 G: 375; 3 INJECTION, SOLUTION INTRAVENOUS at 09:22

## 2021-01-01 RX ADMIN — IOPAMIDOL 115 ML: 755 INJECTION, SOLUTION INTRAVENOUS at 04:37

## 2021-01-01 RX ADMIN — LORAZEPAM 0.5 MG: 2 INJECTION INTRAMUSCULAR; INTRAVENOUS at 03:09

## 2021-01-01 RX ADMIN — LEVETIRACETAM 500 MG: 5 INJECTION INTRAVASCULAR at 22:03

## 2021-01-01 RX ADMIN — TIMOLOL MALEATE 1 DROP: 2.5 SOLUTION/ DROPS OPHTHALMIC at 20:51

## 2021-01-01 RX ADMIN — MINERAL OIL: 1000 LIQUID ORAL at 14:04

## 2021-01-01 RX ADMIN — MORPHINE SULFATE 2 MG: 2 INJECTION, SOLUTION INTRAMUSCULAR; INTRAVENOUS at 18:15

## 2021-01-01 RX ADMIN — IPRATROPIUM BROMIDE AND ALBUTEROL SULFATE 3 ML: 2.5; .5 SOLUTION RESPIRATORY (INHALATION) at 07:43

## 2021-01-01 RX ADMIN — ONDANSETRON 4 MG: 2 INJECTION INTRAMUSCULAR; INTRAVENOUS at 05:42

## 2021-01-01 RX ADMIN — LEVETIRACETAM 500 MG: 5 INJECTION INTRAVASCULAR at 10:03

## 2021-01-01 RX ADMIN — SODIUM CHLORIDE, PRESERVATIVE FREE 10 ML: 5 INJECTION INTRAVENOUS at 13:32

## 2021-01-01 RX ADMIN — HALOPERIDOL LACTATE 1 MG: 5 INJECTION, SOLUTION INTRAMUSCULAR at 05:53

## 2021-01-01 RX ADMIN — SODIUM CHLORIDE 100 ML/HR: 4.5 INJECTION, SOLUTION INTRAVENOUS at 08:22

## 2021-01-01 RX ADMIN — LACOSAMIDE 50 MG: 10 INJECTION INTRAVENOUS at 08:32

## 2021-01-01 RX ADMIN — IPRATROPIUM BROMIDE AND ALBUTEROL SULFATE 3 ML: 2.5; .5 SOLUTION RESPIRATORY (INHALATION) at 13:01

## 2021-01-01 RX ADMIN — SODIUM CHLORIDE, PRESERVATIVE FREE 10 ML: 5 INJECTION INTRAVENOUS at 22:04

## 2021-01-01 RX ADMIN — GADOBENATE DIMEGLUMINE 18 ML: 529 INJECTION, SOLUTION INTRAVENOUS at 06:45

## 2021-01-01 RX ADMIN — HEPARIN SODIUM 5000 UNITS: 5000 INJECTION INTRAVENOUS; SUBCUTANEOUS at 20:09

## 2021-01-01 RX ADMIN — NICARDIPINE HYDROCHLORIDE 5 MG/HR: 0.1 INJECTION, SOLUTION INTRAVENOUS at 01:36

## 2021-01-01 RX ADMIN — POTASSIUM CHLORIDE 10 MEQ: 7.46 INJECTION, SOLUTION INTRAVENOUS at 17:32

## 2021-01-01 RX ADMIN — IOPAMIDOL 78 ML: 755 INJECTION, SOLUTION INTRAVENOUS at 12:52

## 2021-01-01 RX ADMIN — TIMOLOL MALEATE 1 DROP: 2.5 SOLUTION/ DROPS OPHTHALMIC at 20:33

## 2021-01-01 RX ADMIN — LATANOPROST 1 DROP: 50 SOLUTION OPHTHALMIC at 22:02

## 2021-01-01 RX ADMIN — SODIUM CHLORIDE 100 ML/HR: 4.5 INJECTION, SOLUTION INTRAVENOUS at 00:43

## 2021-01-01 RX ADMIN — LABETALOL 20 MG/4 ML (5 MG/ML) INTRAVENOUS SYRINGE 20 MG: at 14:19

## 2021-01-01 RX ADMIN — LEVETIRACETAM 250 MG: 250 TABLET ORAL at 10:00

## 2021-01-01 RX ADMIN — LABETALOL 20 MG/4 ML (5 MG/ML) INTRAVENOUS SYRINGE 20 MG: at 22:05

## 2021-01-01 RX ADMIN — GLYCERIN 1 SUPPOSITORY: 2 SUPPOSITORY RECTAL at 00:33

## 2021-01-01 RX ADMIN — TAZOBACTAM SODIUM AND PIPERACILLIN SODIUM 3.38 G: 375; 3 INJECTION, SOLUTION INTRAVENOUS at 01:38

## 2021-01-01 RX ADMIN — METOCLOPRAMIDE 5 MG: 5 INJECTION, SOLUTION INTRAMUSCULAR; INTRAVENOUS at 05:14

## 2021-01-01 RX ADMIN — LEVETIRACETAM 500 MG: 5 INJECTION INTRAVASCULAR at 20:12

## 2021-01-01 RX ADMIN — SODIUM CHLORIDE, PRESERVATIVE FREE 10 ML: 5 INJECTION INTRAVENOUS at 08:42

## 2021-01-01 RX ADMIN — METOCLOPRAMIDE 5 MG: 5 INJECTION, SOLUTION INTRAMUSCULAR; INTRAVENOUS at 17:30

## 2021-01-01 RX ADMIN — SODIUM CHLORIDE, PRESERVATIVE FREE 10 ML: 5 INJECTION INTRAVENOUS at 20:10

## 2021-01-01 RX ADMIN — METOCLOPRAMIDE 5 MG: 5 INJECTION, SOLUTION INTRAMUSCULAR; INTRAVENOUS at 11:40

## 2021-01-01 RX ADMIN — LABETALOL 20 MG/4 ML (5 MG/ML) INTRAVENOUS SYRINGE 20 MG: at 05:54

## 2021-01-01 RX ADMIN — POTASSIUM CHLORIDE 10 MEQ: 7.46 INJECTION, SOLUTION INTRAVENOUS at 13:54

## 2021-01-01 RX ADMIN — LACOSAMIDE 50 MG: 10 INJECTION INTRAVENOUS at 20:34

## 2021-01-01 RX ADMIN — NICARDIPINE HYDROCHLORIDE 5 MG/HR: 0.1 INJECTION, SOLUTION INTRAVENOUS at 04:42

## 2021-01-01 RX ADMIN — SODIUM CHLORIDE 100 ML/HR: 4.5 INJECTION, SOLUTION INTRAVENOUS at 14:05

## 2021-01-01 RX ADMIN — METOCLOPRAMIDE 5 MG: 5 INJECTION, SOLUTION INTRAMUSCULAR; INTRAVENOUS at 23:29

## 2021-01-01 RX ADMIN — NICARDIPINE HYDROCHLORIDE 5 MG/HR: 0.1 INJECTION, SOLUTION INTRAVENOUS at 22:00

## 2021-01-01 RX ADMIN — TAZOBACTAM SODIUM AND PIPERACILLIN SODIUM 3.38 G: 375; 3 INJECTION, SOLUTION INTRAVENOUS at 10:03

## 2021-01-01 RX ADMIN — TIMOLOL MALEATE 1 DROP: 2.5 SOLUTION/ DROPS OPHTHALMIC at 22:03

## 2021-01-01 RX ADMIN — IPRATROPIUM BROMIDE AND ALBUTEROL SULFATE 3 ML: 2.5; .5 SOLUTION RESPIRATORY (INHALATION) at 15:12

## 2021-01-01 RX ADMIN — SODIUM CHLORIDE, POTASSIUM CHLORIDE, SODIUM LACTATE AND CALCIUM CHLORIDE 100 ML/HR: 600; 310; 30; 20 INJECTION, SOLUTION INTRAVENOUS at 10:34

## 2021-01-01 RX ADMIN — SODIUM CHLORIDE, PRESERVATIVE FREE 10 ML: 5 INJECTION INTRAVENOUS at 20:33

## 2021-01-01 RX ADMIN — POTASSIUM CHLORIDE 10 MEQ: 7.46 INJECTION, SOLUTION INTRAVENOUS at 21:50

## 2021-01-01 RX ADMIN — SODIUM CHLORIDE, POTASSIUM CHLORIDE, SODIUM LACTATE AND CALCIUM CHLORIDE 100 ML/HR: 600; 310; 30; 20 INJECTION, SOLUTION INTRAVENOUS at 21:48

## 2021-01-01 RX ADMIN — MORPHINE SULFATE 4 MG: 4 INJECTION, SOLUTION INTRAMUSCULAR; INTRAVENOUS at 03:12

## 2021-01-01 RX ADMIN — IPRATROPIUM BROMIDE AND ALBUTEROL SULFATE 3 ML: 2.5; .5 SOLUTION RESPIRATORY (INHALATION) at 12:05

## 2021-01-01 RX ADMIN — POTASSIUM CHLORIDE 10 MEQ: 7.46 INJECTION, SOLUTION INTRAVENOUS at 01:38

## 2021-01-01 RX ADMIN — IPRATROPIUM BROMIDE AND ALBUTEROL SULFATE 3 ML: 2.5; .5 SOLUTION RESPIRATORY (INHALATION) at 07:34

## 2021-01-01 RX ADMIN — METOCLOPRAMIDE 5 MG: 5 INJECTION, SOLUTION INTRAMUSCULAR; INTRAVENOUS at 17:18

## 2021-01-01 RX ADMIN — SODIUM CHLORIDE, PRESERVATIVE FREE 10 ML: 5 INJECTION INTRAVENOUS at 20:51

## 2021-01-01 RX ADMIN — SODIUM CHLORIDE, PRESERVATIVE FREE 10 ML: 5 INJECTION INTRAVENOUS at 11:40

## 2021-01-01 RX ADMIN — HEPARIN SODIUM 5000 UNITS: 5000 INJECTION INTRAVENOUS; SUBCUTANEOUS at 12:13

## 2021-01-01 RX ADMIN — LORAZEPAM 0.5 MG: 2 INJECTION INTRAMUSCULAR; INTRAVENOUS at 17:31

## 2021-01-01 RX ADMIN — SODIUM CHLORIDE, PRESERVATIVE FREE 10 ML: 5 INJECTION INTRAVENOUS at 20:52

## 2021-01-01 RX ADMIN — LABETALOL 20 MG/4 ML (5 MG/ML) INTRAVENOUS SYRINGE 20 MG: at 11:39

## 2021-01-01 RX ADMIN — METHYLNALTREXONE BROMIDE 4 MG: 12 INJECTION, SOLUTION SUBCUTANEOUS at 08:32

## 2021-01-01 RX ADMIN — MORPHINE SULFATE 2 MG: 2 INJECTION, SOLUTION INTRAMUSCULAR; INTRAVENOUS at 17:31

## 2021-01-01 RX ADMIN — DOCUSATE SODIUM 100 MG: 50 LIQUID ORAL at 20:09

## 2021-01-01 RX ADMIN — TIMOLOL MALEATE 1 DROP: 2.5 SOLUTION/ DROPS OPHTHALMIC at 14:03

## 2021-01-01 RX ADMIN — ACETAMINOPHEN 650 MG: 650 SUPPOSITORY RECTAL at 00:18

## 2021-01-01 RX ADMIN — LACOSAMIDE 50 MG: 10 INJECTION INTRAVENOUS at 08:42

## 2021-01-01 RX ADMIN — MORPHINE SULFATE 2 MG: 2 INJECTION, SOLUTION INTRAMUSCULAR; INTRAVENOUS at 14:32

## 2021-01-01 RX ADMIN — TAZOBACTAM SODIUM AND PIPERACILLIN SODIUM 3.38 G: 375; 3 INJECTION, SOLUTION INTRAVENOUS at 01:35

## 2021-01-01 RX ADMIN — MORPHINE SULFATE 1 MG: 2 INJECTION, SOLUTION INTRAMUSCULAR; INTRAVENOUS at 13:54

## 2021-01-01 RX ADMIN — HEPARIN SODIUM 5000 UNITS: 5000 INJECTION INTRAVENOUS; SUBCUTANEOUS at 14:14

## 2021-01-01 RX ADMIN — TAZOBACTAM SODIUM AND PIPERACILLIN SODIUM 3.38 G: 375; 3 INJECTION, SOLUTION INTRAVENOUS at 17:18

## 2021-01-01 RX ADMIN — POTASSIUM CHLORIDE 10 MEQ: 7.46 INJECTION, SOLUTION INTRAVENOUS at 11:01

## 2021-01-01 RX ADMIN — IPRATROPIUM BROMIDE AND ALBUTEROL SULFATE 3 ML: 2.5; .5 SOLUTION RESPIRATORY (INHALATION) at 19:43

## 2021-01-01 RX ADMIN — LACOSAMIDE 50 MG: 10 INJECTION INTRAVENOUS at 22:29

## 2021-01-01 RX ADMIN — SODIUM CHLORIDE, PRESERVATIVE FREE 10 ML: 5 INJECTION INTRAVENOUS at 08:10

## 2021-01-01 RX ADMIN — TAZOBACTAM SODIUM AND PIPERACILLIN SODIUM 3.38 G: 375; 3 INJECTION, SOLUTION INTRAVENOUS at 20:06

## 2021-01-01 RX ADMIN — HEPARIN SODIUM 5000 UNITS: 5000 INJECTION INTRAVENOUS; SUBCUTANEOUS at 14:19

## 2021-01-01 RX ADMIN — TIMOLOL MALEATE 1 DROP: 2.5 SOLUTION/ DROPS OPHTHALMIC at 10:05

## 2021-01-01 RX ADMIN — NICARDIPINE HYDROCHLORIDE 5 MG/HR: 0.1 INJECTION, SOLUTION INTRAVENOUS at 21:02

## 2021-01-01 RX ADMIN — SODIUM CHLORIDE, PRESERVATIVE FREE 10 ML: 5 INJECTION INTRAVENOUS at 20:14

## 2021-01-01 RX ADMIN — SODIUM CHLORIDE, POTASSIUM CHLORIDE, SODIUM LACTATE AND CALCIUM CHLORIDE 100 ML/HR: 600; 310; 30; 20 INJECTION, SOLUTION INTRAVENOUS at 23:15

## 2021-01-01 RX ADMIN — MORPHINE SULFATE 2 MG: 2 INJECTION, SOLUTION INTRAMUSCULAR; INTRAVENOUS at 01:38

## 2021-01-01 RX ADMIN — MORPHINE SULFATE 2 MG: 2 INJECTION, SOLUTION INTRAMUSCULAR; INTRAVENOUS at 21:32

## 2021-01-01 RX ADMIN — LEVETIRACETAM 500 MG: 5 INJECTION INTRAVASCULAR at 20:51

## 2021-01-01 RX ADMIN — POTASSIUM CHLORIDE 10 MEQ: 7.46 INJECTION, SOLUTION INTRAVENOUS at 00:27

## 2021-01-01 RX ADMIN — MINERAL OIL: 1000 LIQUID ORAL at 11:01

## 2021-01-01 RX ADMIN — HEPARIN SODIUM 5000 UNITS: 5000 INJECTION INTRAVENOUS; SUBCUTANEOUS at 05:22

## 2021-01-01 RX ADMIN — METOCLOPRAMIDE 5 MG: 5 INJECTION, SOLUTION INTRAMUSCULAR; INTRAVENOUS at 05:22

## 2021-01-01 RX ADMIN — LEVETIRACETAM 500 MG: 5 INJECTION INTRAVASCULAR at 08:44

## 2021-01-01 RX ADMIN — LISINOPRIL 20 MG: 20 TABLET ORAL at 10:00

## 2021-01-01 RX ADMIN — TIMOLOL MALEATE 1 DROP: 2.5 SOLUTION/ DROPS OPHTHALMIC at 08:45

## 2021-01-01 RX ADMIN — IPRATROPIUM BROMIDE AND ALBUTEROL SULFATE 3 ML: 2.5; .5 SOLUTION RESPIRATORY (INHALATION) at 08:19

## 2021-01-01 RX ADMIN — TIMOLOL MALEATE 1 DROP: 2.5 SOLUTION/ DROPS OPHTHALMIC at 09:22

## 2021-01-01 RX ADMIN — TAZOBACTAM SODIUM AND PIPERACILLIN SODIUM 3.38 G: 375; 3 INJECTION, SOLUTION INTRAVENOUS at 03:59

## 2021-01-01 RX ADMIN — NICARDIPINE HYDROCHLORIDE 5 MG/HR: 0.1 INJECTION, SOLUTION INTRAVENOUS at 08:22

## 2021-01-01 RX ADMIN — INSULIN LISPRO 2 UNITS: 100 INJECTION, SOLUTION INTRAVENOUS; SUBCUTANEOUS at 13:31

## 2021-01-01 RX ADMIN — MORPHINE SULFATE 2 MG: 2 INJECTION, SOLUTION INTRAMUSCULAR; INTRAVENOUS at 04:43

## 2021-01-01 RX ADMIN — METOCLOPRAMIDE 10 MG: 5 INJECTION, SOLUTION INTRAMUSCULAR; INTRAVENOUS at 13:58

## 2021-01-01 RX ADMIN — MORPHINE SULFATE 1 MG: 2 INJECTION, SOLUTION INTRAMUSCULAR; INTRAVENOUS at 15:37

## 2021-01-01 RX ADMIN — ENOXAPARIN SODIUM 40 MG: 40 INJECTION SUBCUTANEOUS at 08:33

## 2021-01-01 RX ADMIN — IPRATROPIUM BROMIDE AND ALBUTEROL SULFATE 3 ML: 2.5; .5 SOLUTION RESPIRATORY (INHALATION) at 19:25

## 2021-01-01 RX ADMIN — LATANOPROST 1 DROP: 50 SOLUTION OPHTHALMIC at 23:42

## 2021-01-01 RX ADMIN — METHYLNALTREXONE BROMIDE 4 MG: 12 INJECTION, SOLUTION SUBCUTANEOUS at 10:06

## 2021-01-01 RX ADMIN — LACOSAMIDE 200 MG: 10 INJECTION INTRAVENOUS at 15:46

## 2021-01-01 RX ADMIN — LATANOPROST 1 DROP: 50 SOLUTION OPHTHALMIC at 22:35

## 2021-01-01 RX ADMIN — METOPROLOL TARTRATE 5 MG: 5 INJECTION INTRAVENOUS at 11:11

## 2021-01-01 RX ADMIN — MORPHINE SULFATE 4 MG: 4 INJECTION, SOLUTION INTRAMUSCULAR; INTRAVENOUS at 05:54

## 2021-01-01 RX ADMIN — TAZOBACTAM SODIUM AND PIPERACILLIN SODIUM 3.38 G: 375; 3 INJECTION, SOLUTION INTRAVENOUS at 17:31

## 2021-01-01 RX ADMIN — MORPHINE SULFATE 2 MG: 2 INJECTION, SOLUTION INTRAMUSCULAR; INTRAVENOUS at 02:42

## 2021-01-01 RX ADMIN — METOCLOPRAMIDE 5 MG: 5 INJECTION, SOLUTION INTRAMUSCULAR; INTRAVENOUS at 18:15

## 2021-01-01 RX ADMIN — BISACODYL 10 MG: 10 SUPPOSITORY RECTAL at 11:01

## 2021-01-01 RX ADMIN — IPRATROPIUM BROMIDE AND ALBUTEROL SULFATE 3 ML: 2.5; .5 SOLUTION RESPIRATORY (INHALATION) at 22:39

## 2021-01-01 RX ADMIN — FLUTICASONE PROPIONATE 1 SPRAY: 50 SPRAY, METERED NASAL at 11:10

## 2021-01-01 RX ADMIN — NICARDIPINE HYDROCHLORIDE 5 MG/HR: 0.1 INJECTION, SOLUTION INTRAVENOUS at 17:15

## 2021-01-01 RX ADMIN — LORAZEPAM 2 MG: 2 INJECTION INTRAMUSCULAR; INTRAVENOUS at 08:10

## 2021-01-01 RX ADMIN — IPRATROPIUM BROMIDE AND ALBUTEROL SULFATE 3 ML: 2.5; .5 SOLUTION RESPIRATORY (INHALATION) at 20:11

## 2021-01-01 RX ADMIN — MORPHINE SULFATE 2 MG: 2 INJECTION, SOLUTION INTRAMUSCULAR; INTRAVENOUS at 19:49

## 2021-01-01 RX ADMIN — METOCLOPRAMIDE 5 MG: 5 INJECTION, SOLUTION INTRAMUSCULAR; INTRAVENOUS at 11:00

## 2021-01-01 RX ADMIN — TIMOLOL MALEATE 1 DROP: 2.5 SOLUTION/ DROPS OPHTHALMIC at 11:40

## 2021-01-01 RX ADMIN — NICARDIPINE HYDROCHLORIDE 5 MG/HR: 0.1 INJECTION, SOLUTION INTRAVENOUS at 10:07

## 2021-01-01 RX ADMIN — TAZOBACTAM SODIUM AND PIPERACILLIN SODIUM 3.38 G: 375; 3 INJECTION, SOLUTION INTRAVENOUS at 08:45

## 2021-01-01 RX ADMIN — LACOSAMIDE 50 MG: 10 INJECTION INTRAVENOUS at 10:59

## 2021-01-01 RX ADMIN — SODIUM CHLORIDE, PRESERVATIVE FREE 10 ML: 5 INJECTION INTRAVENOUS at 13:33

## 2021-01-01 RX ADMIN — IPRATROPIUM BROMIDE AND ALBUTEROL SULFATE 3 ML: 2.5; .5 SOLUTION RESPIRATORY (INHALATION) at 12:09

## 2021-01-01 RX ADMIN — LEVETIRACETAM 500 MG: 5 INJECTION INTRAVASCULAR at 20:34

## 2021-01-01 RX ADMIN — SODIUM CHLORIDE, PRESERVATIVE FREE 10 ML: 5 INJECTION INTRAVENOUS at 10:04

## 2021-01-01 RX ADMIN — POTASSIUM CHLORIDE 10 MEQ: 7.46 INJECTION, SOLUTION INTRAVENOUS at 15:00

## 2021-01-01 RX ADMIN — TAZOBACTAM SODIUM AND PIPERACILLIN SODIUM 3.38 G: 375; 3 INJECTION, SOLUTION INTRAVENOUS at 11:03

## 2021-01-01 RX ADMIN — METOCLOPRAMIDE 5 MG: 5 INJECTION, SOLUTION INTRAMUSCULAR; INTRAVENOUS at 23:06

## 2021-01-01 RX ADMIN — LEVETIRACETAM 1000 MG: 10 INJECTION INTRAVASCULAR at 16:11

## 2021-01-01 RX ADMIN — METOCLOPRAMIDE 10 MG: 5 INJECTION, SOLUTION INTRAMUSCULAR; INTRAVENOUS at 23:42

## 2021-01-01 RX ADMIN — VANCOMYCIN HYDROCHLORIDE 1750 MG: 100 INJECTION, POWDER, LYOPHILIZED, FOR SOLUTION INTRAVENOUS at 08:22

## 2021-01-01 RX ADMIN — METOCLOPRAMIDE 5 MG: 5 INJECTION, SOLUTION INTRAMUSCULAR; INTRAVENOUS at 23:13

## 2021-01-01 RX ADMIN — METOCLOPRAMIDE 10 MG: 5 INJECTION, SOLUTION INTRAMUSCULAR; INTRAVENOUS at 08:33

## 2021-01-01 RX ADMIN — LEVETIRACETAM 500 MG: 5 INJECTION INTRAVASCULAR at 08:22

## 2021-01-01 RX ADMIN — FLUTICASONE PROPIONATE 1 SPRAY: 50 SPRAY, METERED NASAL at 10:05

## 2021-01-01 RX ADMIN — MINERAL OIL: 1000 LIQUID ORAL at 17:30

## 2021-01-01 RX ADMIN — MORPHINE SULFATE 2 MG: 2 INJECTION, SOLUTION INTRAMUSCULAR; INTRAVENOUS at 08:52

## 2021-01-01 RX ADMIN — ONDANSETRON 4 MG: 2 INJECTION INTRAMUSCULAR; INTRAVENOUS at 05:43

## 2021-01-01 RX ADMIN — HEPARIN SODIUM 5000 UNITS: 5000 INJECTION INTRAVENOUS; SUBCUTANEOUS at 05:14

## 2021-01-01 RX ADMIN — HEPARIN SODIUM 5000 UNITS: 5000 INJECTION INTRAVENOUS; SUBCUTANEOUS at 22:00

## 2021-01-01 RX ADMIN — HEPARIN SODIUM 5000 UNITS: 5000 INJECTION INTRAVENOUS; SUBCUTANEOUS at 05:40

## 2021-01-01 RX ADMIN — ENOXAPARIN SODIUM 40 MG: 40 INJECTION SUBCUTANEOUS at 15:46

## 2021-01-01 RX ADMIN — LATANOPROST 1 DROP: 50 SOLUTION OPHTHALMIC at 20:10

## 2021-01-01 RX ADMIN — IPRATROPIUM BROMIDE AND ALBUTEROL SULFATE 3 ML: 2.5; .5 SOLUTION RESPIRATORY (INHALATION) at 15:33

## 2021-01-01 RX ADMIN — LEVETIRACETAM 500 MG: 5 INJECTION INTRAVASCULAR at 08:32

## 2021-01-01 RX ADMIN — NICARDIPINE HYDROCHLORIDE 5 MG/HR: 0.1 INJECTION, SOLUTION INTRAVENOUS at 05:41

## 2021-01-01 RX ADMIN — MORPHINE SULFATE 2 MG: 2 INJECTION, SOLUTION INTRAMUSCULAR; INTRAVENOUS at 10:59

## 2021-01-01 RX ADMIN — METOCLOPRAMIDE 10 MG: 5 INJECTION, SOLUTION INTRAMUSCULAR; INTRAVENOUS at 05:40

## 2021-01-01 RX ADMIN — LEVETIRACETAM 1000 MG: 10 INJECTION INTRAVASCULAR at 05:47

## 2021-01-01 RX ADMIN — METOCLOPRAMIDE 5 MG: 5 INJECTION, SOLUTION INTRAMUSCULAR; INTRAVENOUS at 12:13

## 2021-01-01 RX ADMIN — MINERAL OIL: 1000 LIQUID ORAL at 22:10

## 2021-01-01 RX ADMIN — TAZOBACTAM SODIUM AND PIPERACILLIN SODIUM 3.38 G: 375; 3 INJECTION, SOLUTION INTRAVENOUS at 02:58

## 2021-01-01 RX ADMIN — POTASSIUM CHLORIDE 10 MEQ: 7.46 INJECTION, SOLUTION INTRAVENOUS at 12:14

## 2021-01-01 RX ADMIN — LEVETIRACETAM 250 MG: 100 INJECTION, SOLUTION INTRAVENOUS at 22:34

## 2021-01-01 RX ADMIN — MORPHINE SULFATE 2 MG: 2 INJECTION, SOLUTION INTRAMUSCULAR; INTRAVENOUS at 07:40

## 2021-01-01 RX ADMIN — TAZOBACTAM SODIUM AND PIPERACILLIN SODIUM 3.38 G: 375; 3 INJECTION, SOLUTION INTRAVENOUS at 18:32

## 2021-01-01 RX ADMIN — METOCLOPRAMIDE 10 MG: 5 INJECTION, SOLUTION INTRAMUSCULAR; INTRAVENOUS at 17:40

## 2021-03-22 NOTE — PROGRESS NOTES
Please notify the patient that per the radiology report alone the temporal lobe.  The Dr. Henning has been following for several years appears stable with no enlargement or growth.  I do highly recommend he follow-up with Dr. Henning as scheduled on 4/15/2021 at 9:15 AM as scheduled so that Dr. Henning can review the imaging again and ensure he agrees with the radiologist interpretation.  Please fax a copy of MRI of the brain report to PCP.  Thanks, SHANON Boss.    (LJ Henning-report forwarded to you to ensure you can follow-up and review imaging when you return to clinic after 3/20/2021.)

## 2021-03-24 NOTE — TELEPHONE ENCOUNTER
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 Dr. Henning has been following for several years and the radiologist reads this as no change.  I do highly recommend he follow-up with Dr. Henning as scheduled on 4/15/2021 at 9:15 AM so he can also review the imaging again and ensure he agrees with the radiologist interpretation.      Please fax a copy of MRI of the brain report to PCP.  Thanks, SHANON Boss.     (LJ Henning-report forwarded to you to ensure you can follow-up and review imaging when you return to clinic after 3/20/2021.)

## 2021-04-15 PROBLEM — R53.82 CHRONIC FATIGUE: Status: ACTIVE | Noted: 2021-01-01

## 2021-04-15 NOTE — PROGRESS NOTES
Subjective:     Patient ID: Jonatan Ocampo is a 80 y.o. male.    CC:   You have chosen to receive care through a telephone visit. Do you consent to use a telephone visit for your medical care today? Yes    HPI:   History of Present Illness  The following portions of the patient's history were reviewed and updated as appropriate: allergies, current medications, past family history, past medical history, past social history, past surgical history and problem list.     Patient is less drowsy, dragging his feet less since his Keppra dose was decreased, no seizures, labs and MRI stable. He still has fatigue, likes to take naps, has had to increase his BP med dose. Not interested in a sleep study right now.    Past Medical History:   Diagnosis Date   • Abnormal MRI of head    • Generalized convulsive seizure (CMS/HCC)    • Hyperlipidemia    • Hypertension    • Seizures (CMS/HCC)        Past Surgical History:   Procedure Laterality Date   • NO PAST SURGERIES         Social History     Socioeconomic History   • Marital status:      Spouse name: Not on file   • Number of children: Not on file   • Years of education: Not on file   • Highest education level: Not on file   Tobacco Use   • Smoking status: Never Smoker   • Smokeless tobacco: Never Used   Substance and Sexual Activity   • Alcohol use: No   • Drug use: No   • Sexual activity: Defer       Family History   Problem Relation Age of Onset   • Alzheimer's disease Mother    • Depression Mother    • Dementia Mother    • Cancer Mother         Review of Systems   Constitutional: Positive for fatigue.   Neurological: Negative for seizures.   All other systems reviewed and are negative.       Objective:  There were no vitals taken for this visit.    Neurologic Exam    Physical Exam  Neurological:      Comments: Speech clear         Assessment/Plan:       Diagnoses and all orders for this visit:    1. Mass of temporal lobe (Primary), stable   - Continue Keppra  2.  Generalized convulsive seizures (CMS/HCC)   - Continue Keppra  3. Chronic fatigue   - Daily walking.   - Maintain hydration.   - Consider sleep study.       Time: 12 min.      Cesar Henning MD  4/15/2021

## 2021-07-05 PROBLEM — I16.0 HYPERTENSIVE URGENCY: Status: ACTIVE | Noted: 2021-01-01

## 2021-07-05 PROBLEM — R41.82 ALTERED MENTAL STATUS: Status: ACTIVE | Noted: 2021-01-01

## 2021-07-05 PROBLEM — J96.01 ACUTE RESPIRATORY FAILURE WITH HYPOXIA (HCC): Status: ACTIVE | Noted: 2021-01-01

## 2021-07-05 PROBLEM — R09.89 SUSPECTED CEREBROVASCULAR ACCIDENT (CVA): Status: ACTIVE | Noted: 2021-01-01

## 2021-07-05 NOTE — PLAN OF CARE
Goal Outcome Evaluation:  Plan of Care Reviewed With: patient  SLP evaluation completed. Will address dysphagia and cognitive-communication in tx. Please see note for further details and recommendations.

## 2021-07-05 NOTE — CONSULTS
Stroke Consult Note    Patient Name: Jonatan Ocampo   MRN: [id number]  Age: 81 y.o.  Sex: male  : 1940    Primary Care Physician: Floyd Heard MD  Referring Physician:  Dr. Childs    TIME STROKE TEAM CALLED: 0410 EST     TIME PATIENT SEEN: 415 EST    Handedness: Right  Race:     Chief Complaint/Reason for Consultation: AMS, left sided weakness and aphasia    Subjective .  HPI: Jonatan Ocampo is an 81-year-old, , right-handed male with known diagnosis of left temporal mass, seizures, HTN, and HLD who presents with AMS, right-sided weakness, aphasia and dysarthria.  History is obtained from patient's wife at bedside.  Patient was at his baseline level of health last night at 2300 when he went to bed.  His wife awoke at 0345 this morning to the patient sitting on the side of the bed, he had apparently projectile vomited.  She reports that he was mumbling incoherently and seemed to be slightly weaker on his left side, he was unable to stand.  She contacted EMS who also reported left-sided weakness, aphasia, hypertension with systolic > 200, and continued vomiting in route to the hospital.  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  He was not considered a surgical candidate at that time.  Patient has been followed by Dr. Henning for seizure management; he has been managed on Keppra 250 mg twice daily-his wife reports patient compliance with all medications, no missed doses, last seizure was several years ago.  She denies noting any seizure-like activity.    On exam patient is drowsy, he follows few commands, does not respond to orientation questions.  Patient does make few attempts to verbalize,  the majority of which is incomprehensible due to dysarthria.  Patient does have a right facial droop.  He does have partial gaze paresis, with left gaze preference  that does overcome the midline.  All visual fields are intact to visual threat.  No drift in bilateral upper extremities,  are equal; slight drift of right lower extremity.  Sensation appears to be intact bilaterally.  Patient continues to have nausea with vomiting on CT table, likely aspirated with an initial O2 sat of 77% on room air -improved on 4 L nasal cannula, initial blood pressure 242/111.  *On reassessment approximately 20 minutes later.  Gaze is now normal, patient is tracking around the room.  More readily follows commands.  Continues to have difficulty with speech and is dysarthric.    Last Known Normal Date/Time: 7/4/2021 @ 2300     Review of Systems   Unable to perform ROS: Mental status change      Past Medical History:   Diagnosis Date   • Abnormal MRI of head    • Generalized convulsive seizure (CMS/HCC)    • Hyperlipidemia    • Hypertension    • Seizures (CMS/HCC)      Past Surgical History:   Procedure Laterality Date   • NO PAST SURGERIES       Family History   Problem Relation Age of Onset   • Alzheimer's disease Mother    • Depression Mother    • Dementia Mother    • Cancer Mother      Social History     Socioeconomic History   • Marital status:      Spouse name: Not on file   • Number of children: Not on file   • Years of education: Not on file   • Highest education level: Not on file   Tobacco Use   • Smoking status: Never Smoker   • Smokeless tobacco: Never Used   Substance and Sexual Activity   • Alcohol use: No   • Drug use: No   • Sexual activity: Defer     Allergies   Allergen Reactions   • Sulfa Antibiotics      RASH     Prior to Admission medications    Medication Sig Start Date End Date Taking? Authorizing Provider   fluticasone (FLONASE) 50 MCG/ACT nasal spray into each nostril. 3/8/16   Marina Schneider MD   latanoprost (XALATAN) 0.005 % ophthalmic solution  5/25/17   Marina Schneider MD   levETIRAcetam (KEPPRA) 500 MG tablet Take 0.5 tablets by mouth Every 12  (Twelve) Hours. 10/1/20   Cesar Henning MD   lisinopril (PRINIVIL,ZESTRIL) 20 MG tablet Take  by mouth. 3/8/16   Provider, MD Marina   Polyethyl Glycol-Propyl Glycol (SYSTANE OP) Systane (propylene glycol)    Provider, MD Marina             Objective     Heart Rate:  [60] 60  Resp:  [16] 16  BP: (242)/(111) 242/111  Neurological Exam  Mental Status  Drowsy. Does not respond to orientation questions. Moderate dysarthria present. Mixed aphasia present. Follows one-step commands.    Cranial Nerves  CN II: Visual fields full to confrontation. Intact to visual threat.  CN III, IV, VI: Abnormal extraocular movements: Left gaze preference. Normal lids and orbits bilaterally.   Right pupil: 5 mm. Round. Reactive to light.   Left pupil: 5 mm. Round. Reactive to light.  CN V:  Right: Normal corneal reflex.  Left: Normal corneal reflex.  CN VII:  Right: There is central facial weakness.  Left: There is no facial weakness.  CN IX, X: Normal gag reflex.    Motor  Normal muscle bulk throughout. No fasciculations present. Normal muscle tone. No abnormal involuntary movements. Strength is 5/5 in all four extremities except as noted.  Right lower extremity strength 4-/5, left lower extremity strength 4/5.    Sensory  Pinprick is normal in upper and lower extremities.     Reflexes                                           Right                      Left  Plantar                           Downgoing                Downgoing    Gait  Not tested.      Physical Exam  Vitals reviewed.   Constitutional:       Appearance: He is ill-appearing.   HENT:      Head: Normocephalic and atraumatic.   Eyes:      General: Lids are normal. No visual field deficit.     Extraocular Movements: EOM normal     Right eye: No nystagmus.      Left eye: No nystagmus.   Cardiovascular:      Rate and Rhythm: Regular rhythm. Bradycardia present.   Pulmonary:      Effort: Pulmonary effort is normal. No respiratory distress.   Abdominal:       General: There is no distension.      Palpations: Abdomen is soft.   Musculoskeletal:      Cervical back: Normal range of motion and neck supple.      Right lower leg: No edema.      Left lower leg: No edema.   Skin:     General: Skin is warm and dry.      Coloration: Skin is pale.   Neurological:      Mental Status: He is easily aroused.      Cranial Nerves: Cranial nerve deficit and dysarthria present.      Sensory: No sensory deficit.      Motor: Weakness present.   Psychiatric:         Speech: Speech is slurred.         Acute Stroke Data    Alteplase (tPA) Inclusion / Exclusion Criteria    Time: 04:30EDT  Person Administering Scale: SHANON Paz    Inclusion Criteria  [x]   18 years of age or greater   []   Onset of symptoms < 4.5 hours before beginning treatment (stroke onset = time patient was last seen well or without symptoms).   []   Diagnosis of acute ischemic stroke causing measurable disabling deficit (Complete Hemianopia, Any Aphasia, Visual or Sensory Extinction, Any weakness limiting sustained effort against gravity)   []   Any remaining deficit considered potentially disabling in view of patient and practitioner   Exclusion criteria (Do not proceed with Alteplase if any are checked under exclusion criteria)  [x]   Onset unknown or GREATER than 4.5 hours   []   ICH on CT/MRI   []   CT demonstrates hypodensity representing acute or subacute infarct   []   Significant head trauma or prior stroke in the previous 3 months   []   Symptoms suggestive of subarachnoid hemorrhage   []   History of un-ruptured intracranial aneurysm GREATER than 10 mm   []   Recent intracranial or intraspinal surgery within the last 3 months   []   Arterial puncture at a non-compressible site in the previous 7 days   []   Active internal bleeding   []   Acute bleeding tendency   []   Platelet count LESS than 100,000 for known hematological diseases such as leukemia, thrombocytopenia or chronic cirrhosis   []   Current  use of anticoagulant with INR GREATER than 1.7 or PT GREATER than 15 seconds, aPTT GREATER than 40 seconds   []   Heparin received within 48 hours, resulting in abnormally elevated aPTT GREATER than upper limit of normal   []   Current use of direct thrombin inhibitors or direct factor Xa inhibitors in the past 48 hours   []   Elevated blood pressure refractory to treatment (systolic GREATER than 185 mm/Hg or diastolic  GREATER than 110 mm/Hg   []   Suspected infective endocarditis and aortic arch dissection   []   Current use of therapeutic treatment dose of low-molecular-weight heparin (LMWH) within the previous 24 hours   []   Structural GI malignancy or bleed   Relative exclusion for all patients  []   Only minor non-disabling symptoms   []   Pregnancy   []   Seizure at onset with postictal residual neurological impairments   []   Major surgery or previous trauma within past 14 days   [x]   History of previous spontaneous ICH, intracranial neoplasm, or AV malformation   []   Postpartum (within previous 14 days)   []   Recent GI or urinary tract hemorrhage (within previous 21 days)   []   Recent acute MI (within previous 3 months)   []   History of un-ruptured intracranial aneurysm LESS than 10 mm   []   History of ruptured intracranial aneurysm   []   Blood glucose LESS than 50 mg/dL (2.7 mmol/L)   []   Dural puncture within the last 7 days   []   Known GREATER than 10 cerebral microbleeds   Additional exclusions for patients with symptoms onset between 3 and 4.5 hours.  []   Age > 80.   []   On any anticoagulants regardless of INR  >>> Warfarin (Coumadin), Heparin, Enoxaparin (Lovenox), fondaparinux (Arixtra), bivalirudin (Angiomax), Argatroban, dabigatran (Pradaxa), rivaroxaban (Xarelto), or apixaban (Eliquis)   []   Severe stroke (NIHSS > 25).   []   History of BOTH diabetes and previous ischemic stroke.   []   The risks and benefits have been discussed with the patient or family related to the administration  of IV Alteplase for stroke symptoms.   []   I have discussed and reviewed the patient's case and imaging with the attending prior to IV Alteplase.    Time Alteplase administered       Hospital Meds:  Scheduled- levETIRAcetam, 1,000 mg, Intravenous, Once  ondansetron, , ,   ondansetron, 4 mg, Intravenous, Once      Infusions- niCARdipine, 5-15 mg/hr       PRNs- sodium chloride    Functional Status Prior to Current Stroke/Mariaa Score: MRS unknown    NIH Stroke Scale  Time: 04:30 EDT  Person Administering Scale: SHANON Paz    1a  Level of consciousness: 1=not alert but arousable by minor stimulation to obey, answer or respond   1b. LOC questions:  2=answers both questions correctly   1c. LOC commands: 2=Performs neither task correctly   2.  Best Gaze: 1=partial gaze palsy   3.  Visual: 0=No visual loss   4. Facial Palsy: 1=Minor paralysis (flattened nasolabial fold, asymmetric on smiling)   5a.  Motor left arm: 0=No drift, limb holds 90 (or 45) degrees for full 10 seconds   5b.  Motor right arm: 0=No drift, limb holds 90 (or 45) degrees for full 10 seconds   6a. motor left le=No drift, limb holds 90 (or 45) degrees for full 10 seconds   6b  Motor right le=Drift, limb holds 90 (or 45) degrees but drifts down before full 10 seconds: does not hit bed   7. Limb Ataxia: 0=Absent   8.  Sensory: 0=Normal; no sensory loss   9. Best Language:  2=Severe aphasia   10. Dysarthria: 1=Mild to moderate, patient slurs at least some words and at worst, can be understood with some difficulty   11. Extinction and Inattention: 0=No abnormality    Total:   9       Results Reviewed:  I have personally reviewed current lab, radiology, and data and agree with results.  Hemoglobin   Date Value Ref Range Status   2021 13.9 12.0 - 17.0 g/dL Final     Hematocrit   Date Value Ref Range Status   2021 41 38 - 51 % Final     Lab Results   Component Value Date    GLUCOSE 81 10/01/2020    BUN 17 10/01/2020    CREATININE  1.00 07/05/2021    EGFRIFNONA 59 (L) 10/01/2020    BCR 14.3 10/01/2020    K 4.2 10/01/2020    CO2 28.5 10/01/2020    CALCIUM 9.5 10/01/2020    ALBUMIN 4.10 10/01/2020    AST 19 10/01/2020    ALT 13 10/01/2020     CT Angiogram Neck    Result Date: 7/5/2021  1.  No significant extracranial carotid or vertebral artery stenosis. 2.  Moderately severe focal stenosis of the right P1 PCA segment, likely atherosclerotic. Bilateral fetal-type PCom supply both posterior cerebral arteries. No additional evidence of intracranial flow limiting stenosis or large vessel occlusion. Signer Name: Dain Kirk MD  Signed: 7/5/2021 5:12 AM  Workstation Name: [facility]    XR Chest 1 View    Result Date: 7/5/2021  No active disease. Signer Name: Dain Kirk MD  Signed: 7/5/2021 5:13 AM  Workstation Name: [facility]    CT Head Without Contrast Stroke Protocol    Result Date: 7/5/2021  1.  No clearly acute intracranial abnormality. 2.  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 3.  Stable diffuse chronic changes as noted above. NOTIFICATION: Critical Value/emergent results were relayed from me to the ED treatment team by telephone through the CT technologist, [place], at 04:30 on 7/5/2021. Signer Name: Dain Kirk MD  Signed: 7/5/2021 4:35 AM  Workstation Name: [facility]    CT Angiogram Head w AI Analysis of LVO    Result Date: 7/5/2021  1.  No significant extracranial carotid or vertebral artery stenosis. 2.  Moderately severe focal stenosis of the right P1 PCA segment, likely atherosclerotic. Bilateral fetal-type PCom supply both posterior cerebral arteries. No additional evidence of intracranial flow limiting stenosis or large vessel occlusion. Signer Name: Dain Kirk MD   Signed: 7/5/2021 5:12 AM  Workstation Name: [email or web address]  [facility]    CT CEREBRAL PERFUSION WITH & WITHOUT CONTRAST    Result Date: 7/5/2021  1.  No convincing evidence of acute cerebral ischemia. 2.  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 3.  Consider repeat MR imaging of the brain. Signer Name: Dain Kirk MD  Signed: 7/5/2021 5:00 AM  Workstation Name: [email or web address]  [facility]          Assessment/Plan:   81-year-old, , right-handed male with known diagnosis of left temporal mass, seizures, HTN, and HLD whom I evaluated as a code stroke, patient presents with AMS, right-sided weakness, aphasia and dysarthria.  Patient is drowsy and essentially nonverbal on arrival, left gaze preference, generally weak R > L, right facial droop, continuously vomiting.  NIHSS 9, /111    Diagnostic/imaging  CT shows low-attenuation in the medial left temporal lobe extending into the left insula consistent with prior MRI.  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  No evidence of hemorrhage, no mass-effect or cerebral edema.  CT perfusion shows asymmetry of blood flow in the known region of left temporal mass, no evidence of acute, reversible ischemia.  CTA head and neck shows severe stenosis of the P1 segment of the right PCA with reconstitution.  No LVO, no other hemodynamically significant flow-limiting stenosis.  MRI shows no acute intracranial abnormality, no evidence of acute/subacute stroke.  Left temporal lobe mass extending into the left insula is unchanged      1. 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     -MRI negative for storke   -Keppra 1 g IV in ED   -Continue home keppra dose   -EEG   -Keppra level is pending   -Seizure precautions   -General neurology  consult    2.  Hypertension-initial blood pressure 242/111   -Slowly decrease blood pressure, goal 180-200 for now   -Titrate Cardene as needed          Lauren Paulson, APRN  July 5, 2021  05:13 EDT

## 2021-07-05 NOTE — H&P
[facility]  HISTORY AND PHYSICAL    Patient Name: Jonatan Ocampo  : 1940  MRN: [id number]  Primary Care Physician: Floyd Heard MD  Date of admission: 2021      Subjective   Subjective     Chief Complaint:  Stroke like symptoms     HPI:    Jonatan Ocampo is an 81yoM with h/o left temporal mass, seizures, HTN, HLD who presented to the [facility] ED 7/5 AM as a code stroke, with altered mental status, right sided weakness, dysarthria and aphasia. Upon my evaluation, patient has required restraints due to attempting to get out of bed numerous times, pulling at lines etc.. no family is present during my assessment and patient is unable to participate in history. Per ED note- Patient also noted to have vomiting and hypoxia in the ED- requiring 2-3 L on no home baseline.     Was evaluated by the stroke team in the ED, CT imaging with left temporal lobe mass c/w prior MRI, MRI imaging did not reveal acute CVA. Labs otherwise notable for D-dimer of 9.5, ABG with pO2 76 and glucose 178, otherwise blood work is normal. Patient currently admitted for ongoing stroke w/u and w/u of altered mental status.         COVID Details:    Symptoms:    [x] NONE [] Fever []  Cough [] Shortness of breath [] Change in taste/smell      The patient qualifies to receive the vaccine, but they have not yet received it.      Review of Systems   Unable to assess given mentation  All other systems reviewed and are negative.     Personal History     Past Medical History:   Diagnosis Date   • Abnormal MRI of head    • Generalized convulsive seizure (CMS/HCC)    • Glaucoma    • Hyperlipidemia    • Hypertension    • Seizures (CMS/HCC)        Past Surgical History:   Procedure Laterality Date   • NO PAST SURGERIES         Family History family history includes Alzheimer's disease in his mother; Cancer in his mother; Dementia in his mother; Depression in his mother. Otherwise pertinent FHx was  reviewed and unremarkable.     Social History:  reports that he has never smoked. He has never used smokeless tobacco. He reports that he does not drink alcohol and does not use drugs.  Social History     Social History Narrative   • Not on file       Medications:  Available home medication information reviewed.  Medications Prior to Admission   Medication Sig Dispense Refill Last Dose   • timolol (TIMOPTIC) 0.25 % ophthalmic solution 1 drop 2 (Two) Times a Day.      • fluticasone (FLONASE) 50 MCG/ACT nasal spray into each nostril.      • latanoprost (XALATAN) 0.005 % ophthalmic solution       • levETIRAcetam (KEPPRA) 500 MG tablet Take 0.5 tablets by mouth Every 12 (Twelve) Hours. 180 tablet 5    • lisinopril (PRINIVIL,ZESTRIL) 20 MG tablet Take  by mouth.      • Polyethyl Glycol-Propyl Glycol (SYSTANE OP) Systane (propylene glycol)          Allergies   Allergen Reactions   • Sulfa Antibiotics Hives     RASH       Objective   Objective     Vital Signs:   Temp:  [98.3 °F (36.8 °C)-98.6 °F (37 °C)] 98.3 °F (36.8 °C)  Heart Rate:  [] 108  Resp:  [16] 16  BP: (155-243)/() 157/84  Flow (L/min):  [3-4] 3  Total (NIH Stroke Scale): 11    Physical Exam   GEN- resting in bed, in soft restraints , appears comfortable  HEENT- atraumatic, speech very difficult to understand but is alert   NECK- supple, trachea midline, no masses  RESP: slightly diminished effort but no wheezing, on 3L NC   CV: no murmurs, s1/s2, rrr  MSK: no edema noted, spontaneous movement of all extremities  NEURO: alert but unable to participate fully in neuro exam, moving all extremities  SKIN: no rashes  PSYCH: uto      Result Review:  I have personally reviewed the results from the time of this admission to 7/5/2021 11:39 EDT and agree with these findings:  [x]  Laboratory  []  Microbiology  [x]  Radiology  [x]  EKG/Telemetry   []  Cardiology/Vascular   []  Pathology  [x]  Old records  []  Other:  Most notable findings include:     Imaging  findings as above      LAB RESULTS:      Lab 07/05/21  0702 07/05/21  0431 07/05/21 0427   WBC  --  8.13  --    HEMOGLOBIN  --  14.0  --    HEMOGLOBIN, POC  --   --  13.9   HEMATOCRIT  --  41.1  --    HEMATOCRIT POC  --   --  41   PLATELETS  --  223  --    NEUTROS ABS  --  5.58  --    IMMATURE GRANS (ABS)  --  0.04  --    LYMPHS ABS  --  1.88  --    MONOS ABS  --  0.48  --    EOS ABS  --  0.13  --    MCV  --  91.5  --    PROCALCITONIN  --  0.08  --    D DIMER QUANT 9.53*  --   --          Lab 07/05/21 0431 07/05/21 0427   SODIUM 140  --    POTASSIUM 3.9  --    CHLORIDE 106  --    CO2 22.0  --    ANION GAP 12.0  --    BUN 12  --    CREATININE 0.97 1.00   GLUCOSE 178*  --    CALCIUM 8.7  --    TSH 3.950  --          Lab 07/05/21 0431   TOTAL PROTEIN 7.2   ALBUMIN 3.90   GLOBULIN 3.3   ALT (SGPT) 14   AST (SGOT) 22   BILIRUBIN 0.6   ALK PHOS 61         Lab 07/05/21 0431   PROBNP 360.1   TROPONIN T <0.010                 Lab 07/05/21 0427   PH, ARTERIAL 7.43         Microbiology Results (last 10 days)     ** No results found for the last 240 hours. **          CT Angiogram Neck    Result Date: 7/5/2021  CTA HEAD AND NECK WITH AI ANALYSIS FOR LVO, 7/5/2021 HISTORY: 81-year-old male in the ED with new onset decreased responsiveness. Right side facial droop, difficulty with speech. 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 TECHNIQUE: CT angiogram of the head and neck with contrast. CTA images were reformatted with 3-D postprocessing. Evaluation for a significant carotid arterial stenosis is based on NASCET criteria. Radiation dose reduction techniques included automated exposure control. Radiation audit for known CT and nuclear cardiology exams in the last 12 months: 0. AI analysis for LVO was utilized. COMPARISON: CT brain and CT perfusion exams tonight. CTA NECK: Normal aortic arch with no proximal great vessel stenosis. The vertebral  arteries are widely patent with right vertebral dominance. Cervical carotid bifurcations are widely patent with 0% stenosis in both internal carotid arteries by NASCET criteria. CTA HEAD: Intracranially, there is symmetric distal vascular contrast distribution in the anterior, middle and posterior cerebral artery territories. Fetal type supply of both posterior cerebral arteries. Moderately severe focal atherosclerotic stenosis of the right P1 PCA segment. No additional evidence of intracranial arterial flow limiting stenosis. No large vessel occlusion. No intracranial aneurysm or vascular malformation is identified. Dural venous sinuses appear normal. No abnormal enhancement involving the infiltrative left temporal lobe tumor.     Impression: 1.  No significant extracranial carotid or vertebral artery stenosis. 2.  Moderately severe focal stenosis of the right P1 PCA segment, likely atherosclerotic. Bilateral fetal-type PCom supply both posterior cerebral arteries. No additional evidence of intracranial flow limiting stenosis or large vessel occlusion. Signer Name: Dain Kirk MD  Signed: 7/5/2021 5:12 AM  Workstation Name: [email or web address]  [facility]    MRI Brain With & Without Contrast    Result Date: 7/5/2021  MRI Brain WO W INDICATION: Altered state, nausea and vomiting, generalized weakness, history of seizures, unable to ambulate TECHNIQUE: MRI of the brain with and without IV contrast. A total of 18 cc MultiHance IV contrast was administered. COMPARISON:  CT head and CTA head and neck 7/5/2021 and MRI brain 3/16/2021 FINDINGS: Left temporal lobe somewhat appears expansile with FLAIR signal hyperintensity within the anterior temporal lobe, amygdala, medial temporal lobe, and extending into the left insular region. On the postcontrast acquisitions there is marked motion degradation which limits sensitivity for detecting enhancing intracranial lesion. No definite enhancement is  identified. The diffusion-weighted imaging demonstrates no evidence of acute or early subacute infarct. No intracranial hemorrhage. There are scattered T2/FLAIR signal hyperintensities within the bilateral cerebral and deep white matter which are nonspecific but most commonly associated with chronic microvascular ischemic change. No hydrocephalus. Trace fluid in the mastoid air cells.     Impression: 1.  Allowing for motion degradation, no acute intracranial abnormality or significant interval change. Specifically, there is no evidence of acute or early subacute infarct. 2.  Expansile appearance of the left medial temporal lobe extending into the left insula is unchanged, reportedly consistent with patient's history of low-grade glioma. Signer Name: ZACKERY JOHN MD  Signed: 7/5/2021 7:05 AM  Workstation Name: [facility]  [facility]    XR Chest 1 View    Result Date: 7/5/2021  CHEST X-RAY, 7/5/2021  HISTORY:  81-year-old male in the ED undergoing stroke protocol assessment.  TECHNIQUE: AP portable chest x-ray.  FINDINGS: No definite active disease in the chest. No visible pulmonary infiltrate, pulmonary edema or pleural effusion. Moderate cardiomegaly. Tortuous thoracic aorta. Spinal curvature.     Impression: No active disease. Signer Name: Dain Kirk MD  Signed: 7/5/2021 5:13 AM  Workstation Name: [facility]  [facility]    CT Head Without Contrast Stroke Protocol    Result Date: 7/5/2021  CT HEAD, NONCONTRAST, 7/5/2021 HISTORY: 81-year-old male in the ED undergoing acute stroke evaluation. New onset decreased responsiveness. 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 TECHNIQUE: CT imaging of the head without IV contrast. Radiation dose reduction techniques included automated exposure control. Radiation audit for CT and nuclear cardiology exams in the last 12 months: 0. COMPARISON: *   MRI brain, 3/16/2021 and 1/9/2020. FINDINGS: Low-attenuation changes within the medial left temporal lobe extending to the left insula corresponding to the infiltrative mass best seen on prior MR imaging. This is grossly unchanged. No clearly acute intracranial abnormality. Mild generalized age-appropriate cerebral volume loss. Moderate diffuse chronic small vessel type white matter changes. Old lacunar infarct involving the periventricular body of the right caudate nucleus. These findings are unchanged. No evidence of acute intracranial hemorrhage. No increasing mass effect or cerebral edema. No extra-axial fluid collection or increasing ventricular enlargement.     Impression: 1.  No clearly acute intracranial abnormality. 2.  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 3.  Stable diffuse chronic changes as noted above. NOTIFICATION: Critical Value/emergent results were relayed from me to the ED treatment team by telephone through the CT technologist, [place], at 04:30 on 7/5/2021. Signer Name: Dain Kirk MD  Signed: 7/5/2021 4:35 AM  Workstation Name: [email or web address]  [facility] [place]    CT Angiogram Head w AI Analysis of LVO    Result Date: 7/5/2021  CTA HEAD AND NECK WITH AI ANALYSIS FOR LVO, 7/5/2021 HISTORY: 81-year-old male in the ED with new onset decreased responsiveness. Right side facial droop, difficulty with speech. 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 TECHNIQUE: CT angiogram of the head and neck with contrast. CTA images were reformatted with 3-D postprocessing. Evaluation for a significant carotid arterial stenosis is based on NASCET criteria. Radiation dose reduction techniques included automated exposure control. Radiation audit for known CT and nuclear cardiology exams in the  last 12 months: 0. AI analysis for LVO was utilized. COMPARISON: CT brain and CT perfusion exams tonight. CTA NECK: Normal aortic arch with no proximal great vessel stenosis. The vertebral arteries are widely patent with right vertebral dominance. Cervical carotid bifurcations are widely patent with 0% stenosis in both internal carotid arteries by NASCET criteria. CTA HEAD: Intracranially, there is symmetric distal vascular contrast distribution in the anterior, middle and posterior cerebral artery territories. Fetal type supply of both posterior cerebral arteries. Moderately severe focal atherosclerotic stenosis of the right P1 PCA segment. No additional evidence of intracranial arterial flow limiting stenosis. No large vessel occlusion. No intracranial aneurysm or vascular malformation is identified. Dural venous sinuses appear normal. No abnormal enhancement involving the infiltrative left temporal lobe tumor.     Impression: 1.  No significant extracranial carotid or vertebral artery stenosis. 2.  Moderately severe focal stenosis of the right P1 PCA segment, likely atherosclerotic. Bilateral fetal-type PCom supply both posterior cerebral arteries. No additional evidence of intracranial flow limiting stenosis or large vessel occlusion. Signer Name: Dain Kirk MD  Signed: 7/5/2021 5:12 AM  Workstation Name: [email or web address]  [facility]    CT CEREBRAL PERFUSION WITH & WITHOUT CONTRAST    Result Date: 7/5/2021  CT CEREBRAL PERFUSION, WITH AND WITHOUT CONTRAST, 7/5/2021 HISTORY: 81-year-old male in the ED undergoing acute stroke evaluation. New onset decreased responsiveness. Right side facial droop, difficulty with speech. 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 TECHNIQUE: Axial CT images of the brain without and with intravenous contrast using cerebral perfusion protocol. Post-processing parametric maps were  created using RAPID software and reviewed. Radiation dose reduction techniques included automated exposure control or exposure modulation based on body size. CT and nuclear cardiology exams in the last 12 months: 0. COMPARISON: CT head, tonight. MRI brain, 3/16/2021 and 1/9/2020. FINDINGS: Arterial input function is optimal. There is no convincing evidence of cerebral ischemia or infarction. There is some flow asymmetry in the region of the low medial left temporal lobe and insular region, particularly on the Tmax map, that is likely attributable to known infiltrative tumor in this region. This region is also identified as abnormal on the CT attenuation hypodensity map.     Impression: 1.  No convincing evidence of acute cerebral ischemia. 2.  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 3.  Consider repeat MR imaging of the brain. Signer Name: Dain Kirk MD  Signed: 7/5/2021 5:00 AM  Workstation Name: [email or web address]  [facility] [place]          Assessment/Plan   Assessment & Plan     Active Hospital Problems    Diagnosis  POA   • **Suspected cerebrovascular accident (CVA) [R09.89]  Yes   • Hypertensive urgency [I16.0]  Yes   • Acute respiratory failure with hypoxia (CMS/HCC) [J96.01]  Yes   • Altered mental status [R41.82]  Yes   • Hypertension [I10]  Yes   • Hyperlipidemia [E78.5]  Yes   • Generalized convulsive seizures (CMS/HCC) [R56.9]  Yes       Jonatan Ocampo is an 81yoM with h/o left temporal mass, seizures, HTN, HLD who presented to the [facility] ED 7/5 AM as a code stroke, with altered mental status, right sided weakness, dysarthria and aphasia. Patient also noted to have vomiting and hypoxia in the ED- requiring 2-3 L on no home baseline. Was evaluated by the stroke team in the ED, CT imaging with left temporal lobe mass c/w prior MRI, MRI imaging did not reveal acute CVA. Patient  currently admitted for ongoing stroke w/u and w/u of altered mental status.     Altered mental status/right extremity weakness  Seizure versus CVA   -Already loaded with Keppra 1 g, continue home dose.  -MRI negative for acute CVA as above  -EEG, seizure precautions, restraints as needed   --general neurology to see      Hypoxia-requiring 2 to 3 L of oxygen to maintain sats around 92%, with no known history of lung disease, denies any complaint of cough.  Elevated D-dimer   -?  Aspiration pneumonia-continue to monitor O2 supplementation.  --also concern of possible PE given elevated D-dimer- will order CTPE protocol to r/o   --covid screen ordered and pending though low suspicion    Addendum- CTA negative, but patient noted to have worsening hypoxia/resp status following dinner (after which he was ordered modified diet with SLP). Nursing noted they were concern about aspiration and patient had episode of vomiting. CXR obtained with ? New infiltrate on right. Will start zosyn. Keep NPO. Watch oxygen status closely.      Glaucoma-on timolol eyedrops,-continue home drops     Hypertension-- uncontrolled  -lisinopril 20 mg daily-currently uncontrolled, on Cardene drip will continue, troponin less than 0.01, EKG  Sinus rhythm 75 bpm, , LVH criteria, no acute ST-T wave changes.     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  Patient follows up with Dr. Hurt. Continue Keppra as above     Hyperlipidemia-not on statin    DVT prophylaxis:  Add lovenox as MRI negative for acute CVA-- pending CTPE above     Attempted to call spouse, Roxy Ocampo at both numbers listed at 1234pm, no answer    D/w spouse Roxy at bedside around 2pm. She reports patient was in normal state of health until last evening after going to a BBQ while eating hot dog, potato salad. Later on reports vomiting and confusion.  Ultimately confusion and inability to communicate with him brought him to ED      CODE STATUS:  full  Code Status and Medical Interventions:   Ordered at: 07/05/21 0604     Level Of Support Discussed With:    Patient     Code Status:    CPR     Medical Interventions (Level of Support Prior to Arrest):    Full       Admission Status:  I believe this patient meets INPATIENT status due to workup as above/altered mentation.  I feel patient’s risk for adverse outcomes and need for care warrant INPATIENT evaluation and I predict the patient’s care encounter to likely last beyond 2 midnights.      Anna Stanford MD  07/05/21

## 2021-07-05 NOTE — ED PROVIDER NOTES
[facility]    EMERGENCY DEPARTMENT ENCOUNTER      Pt Name: Jonatan Ocampo  MRN: [id number]  YOB: 1940  Date of evaluation: 7/5/2021  Provider: Kendell Childs DO    CHIEF COMPLAINT       Chief Complaint   Patient presents with   • Stroke         HISTORY OF PRESENT ILLNESS  (Location/Symptom, Timing/Onset, Context/Setting, Quality, Duration, Modifying Factors, Severity.)   Jonatan Ocampo is a 81 y.o. male who presents to the emergency department via EMS for evaluation of altered state, nausea and vomiting, generalized weakness. Majority the history is provided by EMS as well as the wife who arrives after the patient is in the emergency department. She notes the patient woke from sleep with sitting up on the side the bed and was profusely vomiting on himself, was unable to get up out of bed. She did not notice any tonic-clonic or seizure activity as he does have a history of seizures and is on Keppra 250 mg twice daily. Follows with neurology, Dr. Henning, and has been relatively stable. She notes he does feel he wasn't acting himself, was generally weak, unable to ambulate which prompted a call to EMS for transfer to the hospital. They note the patient had again vomiting in route, some intermittent weakness, not really following commands, concern for possible stroke or seizure activity. The patient self does not provide any additional history at this time. EMS notes patient's blood pressures were significantly elevated over 220 systolic in route. He was not given any medicines on route to the hospital.      Nursing notes were reviewed.    REVIEW OF SYSTEMS    (2-9 systems for level 4, 10 or more for level 5)   ROS:  Except as noted above unable to fully assess review of systems secondary to patient's limited verbal state      PAST MEDICAL HISTORY     Past Medical History:   Diagnosis Date   • Abnormal MRI of head    • Generalized convulsive seizure (CMS/HCC)    • Glaucoma    • Hyperlipidemia    •  Hypertension    • Seizures (CMS/HCC)          SURGICAL HISTORY       Past Surgical History:   Procedure Laterality Date   • NO PAST SURGERIES           CURRENT MEDICATIONS       Current Facility-Administered Medications:   •  niCARdipine (CARDENE) 20 mg in 200 mL NS infusion, 5-15 mg/hr, Intravenous, Titrated, Kendell Childs DO, Last Rate: 50 mL/hr at 07/05/21 0541, 5 mg/hr at 07/05/21 0541  •  sodium chloride 0.9 % flush 10 mL, 10 mL, Intravenous, PRN, Kendell Childs DO    Current Outpatient Medications:   •  timolol (TIMOPTIC) 0.25 % ophthalmic solution, 1 drop 2 (Two) Times a Day., Disp: , Rfl:   •  fluticasone (FLONASE) 50 MCG/ACT nasal spray, into each nostril., Disp: , Rfl:   •  latanoprost (XALATAN) 0.005 % ophthalmic solution, , Disp: , Rfl:   •  levETIRAcetam (KEPPRA) 500 MG tablet, Take 0.5 tablets by mouth Every 12 (Twelve) Hours., Disp: 180 tablet, Rfl: 5  •  lisinopril (PRINIVIL,ZESTRIL) 20 MG tablet, Take  by mouth., Disp: , Rfl:   •  Polyethyl Glycol-Propyl Glycol (SYSTANE OP), Systane (propylene glycol), Disp: , Rfl:     ALLERGIES     Sulfa antibiotics    FAMILY HISTORY       Family History   Problem Relation Age of Onset   • Alzheimer's disease Mother    • Depression Mother    • Dementia Mother    • Cancer Mother           SOCIAL HISTORY       Social History     Socioeconomic History   • Marital status:      Spouse name: Not on file   • Number of children: Not on file   • Years of education: Not on file   • Highest education level: Not on file   Tobacco Use   • Smoking status: Never Smoker   • Smokeless tobacco: Never Used   Substance and Sexual Activity   • Alcohol use: No   • Drug use: No   • Sexual activity: Defer         PHYSICAL EXAM    (up to 7 for level 4, 8 or more for level 5)     Vitals:    07/05/21 0435 07/05/21 0547 07/05/21 0548   BP: (!) 242/111     BP Location: Right arm     Pulse: 60     Resp: 16     Temp:   98.6 °F (37 °C)   TempSrc:   Axillary   SpO2: 94%    "  Weight:  87.1 kg (192 lb)    Height:  175.3 cm (69\")        Physical Exam  General : Patient is sitting in bed, opens his eyes, is having difficulties with answering questions and following commands.  HEENT: Pupils are equally round and reactive to light, EOMI, conjunctivae clear  Neck: Neck is supple, trachea midline  Cardiac: Heart regular rate, rhythm  Lungs: Lungs are clear to auscultation  Chest wall: There is no tenderness to palpation over the chest wall or over ribs  Abdomen: Abdomen is soft, nondistended   Musculoskeletal: No significant peripheral edema, patient is able to individually hold each limb up off the bed for over 4 seconds.  Neuro: Patient is awake but not answering questions appropriately, is following some commands but not all which limits her full ability to obtain neurological examination is he is not able to speak to us at this time. He is able to hold his arms up off the bed without significant pronator drift, there is very mild right-sided facial droop, patient tries to speak does have very mild slurred speech, is having difficulty forming full sentences. He is able to hold each limb off the bed for greater than 5 seconds. Very mild right-sided weakness if any compared to the left. Please refer to stroke navigator's chart for full NIH scale.  Dermatology: Skin is warm and dry        DIAGNOSTIC RESULTS     EKG: All EKG's are interpreted by the Emergency Department Physician who either signs or Co-signs this chart in the absence of a cardiologist.    ECG 12 Lead   Final Result   Test Reason : Stroke Protocol (Onset > 12 Hrs)   Blood Pressure :   */*   mmHG   Vent. Rate :  75 BPM     Atrial Rate :  75 BPM      P-R Int : 298 ms          QRS Dur :  94 ms       QT Int : 394 ms       P-R-T Axes :  48 -22  25 degrees      QTc Int : 439 ms      Sinus rhythm with 1st degree AV block   Moderate voltage criteria for LVH, may be normal variant   Borderline ECG   No previous ECGs available "   Confirmed by AKASH GUERRA MD ([id number]) on 7/5/2021 5:32:43 AM      Referred By: EDMD           Confirmed By: AKASH GUERRA MD          RADIOLOGY:   Non-plain film images such as CT, Ultrasound and MRI are read by the radiologist. Plain radiographic images are visualized and preliminarily interpreted by the emergency physician with the below findings:      [] Radiologist's Report Reviewed:  XR Chest 1 View   Final Result   No active disease.      Signer Name: Dain Kirk MD    Signed: 7/5/2021 5:13 AM    Workstation Name: [facility]      CT Angiogram Head w AI Analysis of LVO   Final Result   1.  No significant extracranial carotid or vertebral artery stenosis.   2.  Moderately severe focal stenosis of the right P1 PCA segment, likely atherosclerotic. Bilateral fetal-type PCom supply both posterior cerebral arteries. No additional evidence of intracranial flow limiting stenosis or large vessel occlusion.      Signer Name: Dain Kirk MD    Signed: 7/5/2021 5:12 AM    Workstation Name: [facility]      CT CEREBRAL PERFUSION WITH & WITHOUT CONTRAST   Final Result   1.  No convincing evidence of acute cerebral ischemia.   2.  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   3.  Consider repeat MR imaging of the brain.            Signer Name: Dain Kirk MD    Signed: 7/5/2021 5:00 AM    Workstation Name: [facility]      CT Angiogram Neck   Final Result   1.  No significant extracranial carotid or vertebral artery stenosis.   2.  Moderately severe focal stenosis of the right P1 PCA segment, likely atherosclerotic. Bilateral fetal-type PCom supply both posterior cerebral arteries. No additional evidence of intracranial flow limiting stenosis or large vessel occlusion.       Signer Name: Dain Kirk MD    Signed: 7/5/2021 5:12 AM    Workstation Name: [facility]     Radiology [facility]      CT Head Without Contrast Stroke Protocol   Final Result   1.  No clearly acute intracranial abnormality.   2.  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   3.  Stable diffuse chronic changes as noted above.      NOTIFICATION: Critical Value/emergent results were relayed from me to the ED treatment team by telephone through the CT technologistKentrell, at 04:30 on 7/5/2021.      Signer Name: Dain Kirk MD    Signed: 7/5/2021 4:35 AM    Workstation Name: [facility]     Radiology [facility]      MRI Brain With & Without Contrast    (Results Pending)         ED BEDSIDE ULTRASOUND:   Performed by ED Physician - none    LABS:    I have reviewed and interpreted all of the currently available lab results from this visit (if applicable):  Results for orders placed or performed during the hospital encounter of 07/05/21   CBC Auto Differential    Specimen: Blood   Result Value Ref Range    WBC 8.13 3.40 - 10.80 10*3/mm3    RBC 4.49 4.14 - 5.80 10*6/mm3    Hemoglobin 14.0 13.0 - 17.7 g/dL    Hematocrit 41.1 37.5 - 51.0 %    MCV 91.5 79.0 - 97.0 fL    MCH 31.2 26.6 - 33.0 pg    MCHC 34.1 31.5 - 35.7 g/dL    RDW 13.5 12.3 - 15.4 %    RDW-SD 45.7 37.0 - 54.0 fl    MPV 10.2 6.0 - 12.0 fL    Platelets 223 140 - 450 10*3/mm3    Neutrophil % 68.7 42.7 - 76.0 %    Lymphocyte % 23.1 19.6 - 45.3 %    Monocyte % 5.9 5.0 - 12.0 %    Eosinophil % 1.6 0.3 - 6.2 %    Basophil % 0.2 0.0 - 1.5 %    Immature Grans % 0.5 0.0 - 0.5 %    Neutrophils, Absolute 5.58 1.70 - 7.00 10*3/mm3    Lymphocytes, Absolute 1.88 0.70 - 3.10 10*3/mm3    Monocytes, Absolute 0.48 0.10 - 0.90 10*3/mm3    Eosinophils, Absolute 0.13 0.00 - 0.40 10*3/mm3    Basophils, Absolute 0.02  0.00 - 0.20 10*3/mm3    Immature Grans, Absolute 0.04 0.00 - 0.05 10*3/mm3    nRBC 0.0 0.0 - 0.2 /100 WBC   Scan Slide    Specimen: Blood   Result Value Ref Range    RBC Morphology Normal Normal    WBC Morphology Normal Normal    Platelet Morphology Normal Normal   Comprehensive Metabolic Panel    Specimen: Blood   Result Value Ref Range    Glucose 178 (H) 65 - 99 mg/dL    BUN 12 8 - 23 mg/dL    Creatinine 0.97 0.76 - 1.27 mg/dL    Sodium 140 136 - 145 mmol/L    Potassium 3.9 3.5 - 5.2 mmol/L    Chloride 106 98 - 107 mmol/L    CO2 22.0 22.0 - 29.0 mmol/L    Calcium 8.7 8.6 - 10.5 mg/dL    Total Protein 7.2 6.0 - 8.5 g/dL    Albumin 3.90 3.50 - 5.20 g/dL    ALT (SGPT) 14 1 - 41 U/L    AST (SGOT) 22 1 - 40 U/L    Alkaline Phosphatase 61 39 - 117 U/L    Total Bilirubin 0.6 0.0 - 1.2 mg/dL    eGFR Non African Amer 74 >60 mL/min/1.73    Globulin 3.3 gm/dL    A/G Ratio 1.2 g/dL    BUN/Creatinine Ratio 12.4 7.0 - 25.0    Anion Gap 12.0 5.0 - 15.0 mmol/L   POC Surgery Labs    Specimen: Blood   Result Value Ref Range    Ionized Calcium 1.14 (L) 1.20 - 1.32 mmol/L    POC Potassium 3.8 3.5 - 4.9 mmol/L    Sodium 143 138 - 146 mmol/L    Total CO2 24 24 - 29 mmol/L    Hemoglobin 13.9 12.0 - 17.0 g/dL    Hematocrit 41 38 - 51 %    pCO2, Arterial 34.6 (L) 35 - 45 mm Hg    pO2, Arterial 39 (L) 80 - 105 mmHg    Base Excess -1.0000 -5 - 5 mmol/L    O2 Saturation, Arterial 76 (L) 95 - 98 %    pH, Arterial 7.43 7.35 - 7.6 pH units    HCO3, Arterial 23.2 22 - 26 mmol/L    Glucose 176 (H) 70 - 130 mg/dL   POC Creatinine    Specimen: Blood   Result Value Ref Range    Creatinine 1.00 0.60 - 1.30 mg/dL   ECG 12 Lead   Result Value Ref Range    QT Interval 394 ms    QTC Interval 439 ms   Green Top (Gel)   Result Value Ref Range    Extra Tube Hold for add-ons.    Lavender Top   Result Value Ref Range    Extra Tube hold for add-on    Gold Top - SST   Result Value Ref Range    Extra Tube Hold for add-ons.         All other labs were within  "normal range or not returned as of this dictation.      EMERGENCY DEPARTMENT COURSE and DIFFERENTIAL DIAGNOSIS/MDM:   Vitals:    Vitals:    07/05/21 0435 07/05/21 0547 07/05/21 0548   BP: (!) 242/111     BP Location: Right arm     Pulse: 60     Resp: 16     Temp:   98.6 °F (37 °C)   TempSrc:   Axillary   SpO2: 94%     Weight:  87.1 kg (192 lb)    Height:  175.3 cm (69\")             Patient with nausea vomiting, altered state upon awakening just shortly prior to arrival. Last known well was but he went to bed last night at some point. He arrives without his wife initially, with his nausea vomiting, intermittent weakness, difficulty with ambulation, inability to speak and answer questions we did initiate a code 19 stroke on arrival. Patient seen by myself medially upon arrival with stroke navigator. Initial CT of the head without any signs of acute hemorrhage. 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 The question whether this is a new onset stroke versus epilepsy/seizure. On arrival his blood pressure is 242/111, we will do gentle infusion for blood pressure control allowing for some hypertension given her concern for possible stroke. No signs of acute large vessel occlusion on angiography, underlying disease is noted. Recommend MRI with and without contrast for further evaluation. Case was discussed with her hospitalist team for admission, continued treatments and monitoring.    MEDICATIONS ADMINISTERED IN ED:  Medications   sodium chloride 0.9 % flush 10 mL (has no administration in time range)   niCARdipine (CARDENE) 20 mg in 200 mL NS infusion ( Intravenous Canceled Entry 7/5/21 0543)   ondansetron (ZOFRAN) injection 4 mg (4 mg Intravenous Given 7/5/21 0542)   ondansetron (ZOFRAN) 4 MG/2ML injection  - ADS Override Pull (4 mg  Given 7/5/21 0543)   iopamidol (ISOVUE-370) 76 % injection 150 mL (115 mL Intravenous Given 7/5/21 0437) "   levETIRAcetam in NaCl 0.75% (KEPPRA) IVPB 1,000 mg (1,000 mg Intravenous New Bag 7/5/21 1819)       PROCEDURES:  Procedures    CRITICAL CARE TIME    Total Critical Care time was 40 minutes, excluding separately reportable procedures. Altered mental state, require multiple interventions, evaluations, neuro checks, discussions with consultants. There was a high probability of clinically significant/life threatening deterioration in the patient's condition which required my urgent intervention.      FINAL IMPRESSION      1. Altered mental status, unspecified altered mental status type    2. Non-intractable vomiting with nausea, unspecified vomiting type    3. Cerebrovascular accident (CVA), unspecified mechanism (CMS/HCC)    4. Hypertensive emergency    5. History of seizure          DISPOSITION/PLAN     ED Disposition     ED Disposition Condition Comment    Decision to Admit              Comment: Please note this report has been produced using speech recognition software.      Kendell Childs DO  Attending Emergency Physician               Kendell Childs DO  07/05/21 0600

## 2021-07-05 NOTE — H&P
[facility] Medicine Services  HISTORY AND PHYSICAL    Patient Name: Jonatan Ocampo  : 1940  MRN: [id number]  Primary Care Physician: Floyd Heard MD  Date of admission: 2021      Subjective   Subjective     Chief Complaint:      HPI:  Jonatan Ocampo is a 81 y.o. male  to ED with chief complaint of altered mental status  Patient went to bed around 11 PM, woke up at 3:45 AM-having projectile vomiting when wife saw him, but complaint wife noted he was mumbling incoherently, slightly weaker on his right side and unable to stand.  On EMS evaluation patient again had right-sided weakness, aphasia, hypertensive with systolic blood pressure more than 200, continued vomiting in route.  On ED evaluation patient's NIH score-9 for severe aphasia, facial paralysis, weakness right lower extremity, partial gaze palsy.    Also patient was found to be hypertensive with systolic blood pressure in 200s, and hypoxic with oxygen sats of 77% on room air.    In ER patient got Cardene drip, Keppra thousand daily, Zofran 4 mg  Current COVID Risks are:  [] Fever []  Cough [] Shortness of breath [] Fatigue [] Change in taste or smell    [] Exposure to COVID positive patient  [] High risk facility   []  NONE  COVID VACCINE status    The patient qualifies to receive the vaccine, but they have not yet received it.      Review of Systems   Complete ROS done, which is negative except as above and HPI.  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  Patient follows up with Dr. Treatment  Hyperlipidemia-not on statin        Past Surgical History:   Procedure Laterality Date   • NO PAST SURGERIES         Family History: family history  includes Alzheimer's disease in his mother; Cancer in his mother; Dementia in his mother; Depression in his mother. Otherwise pertinent FHx was reviewed and unremarkable.     Social History:  reports that he has never smoked. He has never used smokeless tobacco. He reports that he does not drink alcohol and does not use drugs.      Medications:  Available home medication information reviewed.  (Not in a hospital admission)      Allergies   Allergen Reactions   • Sulfa Antibiotics      RASH       Objective   Objective     Vital Signs:   Temp:  [98.6 °F (37 °C)] 98.6 °F (37 °C)  Heart Rate:  [60] 60  Resp:  [16] 16-94% on 4 L  BP: (242)/(111) 242/111   Total (NIH Stroke Scale): 11    Physical Exam     GENERAL- Not distressed, well nourished.  RS- CTA-BL, ,  No wheezing , no crackles, good effort.  CVS- s1s2 Regular, no murmur.  ABD- soft, nontender, not distended, no organomegaly.  EXT- no edema.  NEURO- AAO-3, power 5/5 in all ext, no gross sensory deficit, cranial nerves intact.  EYES- Conjunctivae are normal. Pupils are equal, round, and reactive to light. No scleral icterus.   ENT- no external ear nose lesions, mucosa moist.  NECK- No JVD present. No tracheal deviation present. No thyromegaly present,No cervical lymphadenopathy.  JOINTS/MSK- no deformity, no swelling.  SKIN- no rash , warm to touch.  PSYCHIATRIC- Normal mood and affect. Behavior is normal. Thought content normal.     Results Reviewed:  I have personally reviewed current lab and radiology data.    Results from last 7 days   Lab Units 07/05/21  0431   WBC 10*3/mm3 8.13   HEMOGLOBIN g/dL 14.0   HEMATOCRIT % 41.1   PLATELETS 10*3/mm3 223     Results from last 7 days   Lab Units 07/05/21  0431   SODIUM mmol/L 140   POTASSIUM mmol/L 3.9   CHLORIDE mmol/L 106   CO2 mmol/L 22.0   BUN mg/dL 12   CREATININE mg/dL 0.97   GLUCOSE mg/dL 178*   CALCIUM mg/dL 8.7   ALT (SGPT) U/L 14   AST (SGOT) U/L 22     Estimated Creatinine Clearance: 64.2 mL/min (by C-G  formula based on SCr of 0.97 mg/dL).  Brief Urine Lab Results     None        Imaging Results (Last 24 Hours)     Procedure Component Value Units Date/Time    XR Chest 1 View [[id number]] Collected: 07/05/21 0513     Updated: 07/05/21 0515    Narrative:      CHEST X-RAY, 7/5/2021      HISTORY:    81-year-old male in the ED undergoing stroke protocol assessment.      TECHNIQUE:  AP portable chest x-ray.      FINDINGS:  No definite active disease in the chest. No visible pulmonary infiltrate, pulmonary edema or pleural effusion. Moderate cardiomegaly. Tortuous thoracic aorta. Spinal curvature.      Impression:      No active disease.    Signer Name: Dain Kirk MD   Signed: 7/5/2021 5:13 AM   Workstation Name: [email or web address]    [facility]    CT Angiogram Head w AI Analysis of LVO [[id number]] Collected: 07/05/21 0512     Updated: 07/05/21 0514    Narrative:      CTA HEAD AND NECK WITH AI ANALYSIS FOR LVO, 7/5/2021    HISTORY:  81-year-old male in the ED with new onset decreased responsiveness. Right side facial droop, difficulty with speech. 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    TECHNIQUE:  CT angiogram of the head and neck with contrast. CTA images were reformatted with 3-D postprocessing. Evaluation for a significant carotid arterial stenosis is based on NASCET criteria. Radiation dose reduction techniques included automated exposure  control. Radiation audit for known CT and nuclear cardiology exams in the last 12 months: 0. AI analysis for LVO was utilized.    COMPARISON:  CT brain and CT perfusion exams tonight.    CTA NECK:  Normal aortic arch with no proximal great vessel stenosis. The vertebral arteries are widely patent with right vertebral dominance. Cervical carotid bifurcations are widely patent with 0% stenosis in both internal carotid arteries by NASCET criteria.    CTA HEAD:  Intracranially,  there is symmetric distal vascular contrast distribution in the anterior, middle and posterior cerebral artery territories.    Fetal type supply of both posterior cerebral arteries. Moderately severe focal atherosclerotic stenosis of the right P1 PCA segment. No additional evidence of intracranial arterial flow limiting stenosis. No large vessel occlusion. No intracranial  aneurysm or vascular malformation is identified. Dural venous sinuses appear normal. No abnormal enhancement involving the infiltrative left temporal lobe tumor.      Impression:      1.  No significant extracranial carotid or vertebral artery stenosis.  2.  Moderately severe focal stenosis of the right P1 PCA segment, likely atherosclerotic. Bilateral fetal-type PCom supply both posterior cerebral arteries. No additional evidence of intracranial flow limiting stenosis or large vessel occlusion.    Signer Name: Dain Kirk MD   Signed: 7/5/2021 5:12 AM   Workstation Name: [email or web address]    [facility]    CT Angiogram Neck [[id number]] Collected: 07/05/21 0512     Updated: 07/05/21 0514    Narrative:      CTA HEAD AND NECK WITH AI ANALYSIS FOR LVO, 7/5/2021    HISTORY:  81-year-old male in the ED with new onset decreased responsiveness. Right side facial droop, difficulty with speech. 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    TECHNIQUE:  CT angiogram of the head and neck with contrast. CTA images were reformatted with 3-D postprocessing. Evaluation for a significant carotid arterial stenosis is based on NASCET criteria. Radiation dose reduction techniques included automated exposure  control. Radiation audit for known CT and nuclear cardiology exams in the last 12 months: 0. AI analysis for LVO was utilized.    COMPARISON:  CT brain and CT perfusion exams tonight.    CTA NECK:  Normal aortic arch with no proximal great vessel stenosis. The  vertebral arteries are widely patent with right vertebral dominance. Cervical carotid bifurcations are widely patent with 0% stenosis in both internal carotid arteries by NASCET criteria.    CTA HEAD:  Intracranially, there is symmetric distal vascular contrast distribution in the anterior, middle and posterior cerebral artery territories.    Fetal type supply of both posterior cerebral arteries. Moderately severe focal atherosclerotic stenosis of the right P1 PCA segment. No additional evidence of intracranial arterial flow limiting stenosis. No large vessel occlusion. No intracranial  aneurysm or vascular malformation is identified. Dural venous sinuses appear normal. No abnormal enhancement involving the infiltrative left temporal lobe tumor.      Impression:      1.  No significant extracranial carotid or vertebral artery stenosis.  2.  Moderately severe focal stenosis of the right P1 PCA segment, likely atherosclerotic. Bilateral fetal-type PCom supply both posterior cerebral arteries. No additional evidence of intracranial flow limiting stenosis or large vessel occlusion.    Signer Name: Dain Kirk MD   Signed: 7/5/2021 5:12 AM   Workstation Name: [email or web address]    [facility]    MRI Brain With & Without Contrast [[id number]] Resulted: 07/05/21 0513     Updated: 07/05/21 0513    CT CEREBRAL PERFUSION WITH & WITHOUT CONTRAST [[id number]] Collected: 07/05/21 0500     Updated: 07/05/21 0502    Narrative:      CT CEREBRAL PERFUSION, WITH AND WITHOUT CONTRAST, 7/5/2021    HISTORY:   81-year-old male in the ED undergoing acute stroke evaluation. New onset decreased responsiveness. Right side facial droop, difficulty with speech. He has a prior history of an infiltrative tumor in the medial left temporal lobe felt to represent  low-grade glioma.    TECHNIQUE:   Axial CT images of the brain without and with intravenous contrast using cerebral perfusion protocol. Post-processing parametric  maps were created using RAPID software and reviewed. Radiation dose reduction techniques included automated exposure control  or exposure modulation based on body size. CT and nuclear cardiology exams in the last 12 months: 0.    COMPARISON:   CT head, tonight. MRI brain, 3/16/2021 and 1/9/2020.    FINDINGS:   Arterial input function is optimal.     There is no convincing evidence of cerebral ischemia or infarction. There is some flow asymmetry in the region of the low medial left temporal lobe and insular region, particularly on the Tmax map, that is likely attributable to known infiltrative tumor  in this region. This region is also identified as abnormal on the CT attenuation hypodensity map.      Impression:      1.  No convincing evidence of acute cerebral ischemia.  2.  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  3.  Consider repeat MR imaging of the brain.        Signer Name: Dain Kirk MD   Signed: 7/5/2021 5:00 AM   Workstation Name: [email or web address]    [facility]    CT Head Without Contrast Stroke Protocol [[id number]] Collected: 07/05/21 0435     Updated: 07/05/21 0438    Narrative:      CT HEAD, NONCONTRAST, 7/5/2021    HISTORY:  81-year-old male in the ED undergoing acute stroke evaluation. New onset decreased responsiveness. 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    TECHNIQUE:  CT imaging of the head without IV contrast. Radiation dose reduction techniques included automated exposure control. Radiation audit for CT and nuclear cardiology exams in the last 12 months: 0.    COMPARISON:  *  MRI brain, 3/16/2021 and 1/9/2020.    FINDINGS:  Low-attenuation changes within the medial left temporal lobe extending to the left insula corresponding to the infiltrative mass best seen on prior MR  imaging. This is grossly unchanged.    No clearly acute intracranial abnormality. Mild generalized age-appropriate cerebral volume loss. Moderate diffuse chronic small vessel type white matter changes. Old lacunar infarct involving the periventricular body of the right caudate nucleus. These  findings are unchanged.    No evidence of acute intracranial hemorrhage. No increasing mass effect or cerebral edema. No extra-axial fluid collection or increasing ventricular enlargement.      Impression:      1.  No clearly acute intracranial abnormality.  2.  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  3.  Stable diffuse chronic changes as noted above.    NOTIFICATION: Critical Value/emergent results were relayed from me to the ED treatment team by telephone through the CT technologist, [place], at 04:30 on 7/5/2021.    Signer Name: Dain Kirk MD   Signed: 7/5/2021 4:35 AM   Workstation Name: [email or web address]    [facility]        Blood glucose-178, BUN/creatinine 12/0.9, TSH 3.9, LFTs-WNL  WBC-8, hemoglobin 14, RDW 13, neutrophils 68  CT head-no acute abnormality, left temporal lobe tumor without any change-please see official result.  CT angio neck/head  CTA-moderate severe focal stenosis in right PCA segment-please see the official result.  CT perfusion-nonischemic left side flow asymmetry likely secondary to patient's infiltrative tumor on the temporal lobe.  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  -Follow MRI results,  -Stroke protocol follow-up.    Hypoxia-requiring 2 to 3 L of oxygen to maintain sats around 92%, with no known history of lung disease, denies any complaint of cough.  -?  Aspiration pneumonia-continue to monitor O2 supplementation.    Glaucoma-on timolol eyedrops,-continue home drops    Hypertension-lisinopril 20 mg daily-currently uncontrolled, on Cardene drip will continue, troponin less than 0.01, EKG  Sinus rhythm 75 bpm, , LVH criteria, no acute ST-T wave changes.    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  Patient follows up with Dr. Alves.    Hyperlipidemia-not on statin    DVT prophylaxis:    -TEDs/SCDs      CODE STATUS:    Code Status and Medical Interventions:   Ordered at: 07/05/21 0604     Level Of Support Discussed With:    Patient     Code Status:    CPR     Medical Interventions (Level of Support Prior to Arrest):    Full         Admission Status:  I believe this patient meets inpatient criteria secondary to stroke

## 2021-07-05 NOTE — CASE MANAGEMENT/SOCIAL WORK
Discharge Planning Assessment  [facility]     Patient Name: Jonatan Ocampo  MRN: [id number]  Today's Date: 7/5/2021    Admit Date: 7/5/2021    Discharge Needs Assessment     Row Name 07/05/21 4605       Living Environment    Lives With  spouse    Name(s) of Who Lives With Patient  Roxy    Current Living Arrangements  home/apartment/condo    Primary Care Provided by  self       Transition Planning    Transportation Anticipated  family or friend will provide       Discharge Needs Assessment    Readmission Within the Last 30 Days  no previous admission in last 30 days    Equipment Currently Used at Home  none    Concerns to be Addressed  discharge planning    Anticipated Changes Related to Illness  inability to care for self        Discharge Plan     Row Name 07/05/21 1354       Plan    Plan Comments  I met with Roxy, Mr. Ocampo' wife at the bedside. They live in [facility]. Mr. Ocampo is independent with mobility and activities of daily living at baseline. Roxy states that he is very active and completes most household chores himself. He drives himself. He is admitted to the hospital with altered mental status. Neurology has been consulted. Case management will follow his progress and facilitate discharge planning when medically appropriate.    Final Discharge Disposition Code  30 - still a patient        Continued Care and Services - Admitted Since 7/5/2021    Coordination has not been started for this encounter.         Demographic Summary     Row Name 07/05/21 7226       General Information    General Information Comments  I confirmed that Floyd Heard is Mr. Ocampo' PCP. Medicare is his primary insurer. He does have RX coverage and seconday coverage.        Functional Status     Row Name 07/05/21 8697       Functional Status, IADL    Medications  independent    Meal Preparation  independent    Housekeeping  independent    Laundry  independent    Shopping  independent         Psychosocial    No documentation.       Abuse/Neglect    No documentation.       Legal    No documentation.       Substance Abuse    No documentation.       Patient Forms    No documentation.           Saul Oconnor RN

## 2021-07-05 NOTE — THERAPY EVALUATION
Acute Care - Speech Language Pathology Initial Evaluation  [facility]   Cognitive-Communication Evaluation       Patient Name: Jonatan Ocampo  : 1940  MRN: [id number]  Today's Date: 2021               Admit Date: 2021     Visit Dx:    ICD-10-CM ICD-9-CM   1. Altered mental status, unspecified altered mental status type  R41.82 780.97   2. Non-intractable vomiting with nausea, unspecified vomiting type  R11.2 787.01   3. Cerebrovascular accident (CVA), unspecified mechanism (CMS/HCC)  I63.9 434.91   4. Hypertensive emergency  I16.1 401.9   5. History of seizure  Z87.898 V12.49   6. Dysphagia, unspecified type  R13.10 787.20   7. Cognitive communication deficit  R41.841 799.52   8. Dysarthria  R47.1 784.51     Patient Active Problem List   Diagnosis   • Generalized convulsive seizures (CMS/HCC)   • Mass of temporal lobe   • Hyperlipidemia   • Hypertension   • Chronic fatigue   • Suspected cerebrovascular accident (CVA)   • Hypertensive urgency   • Acute respiratory failure with hypoxia (CMS/HCC)   • Altered mental status     Past Medical History:   Diagnosis Date   • Abnormal MRI of head    • Generalized convulsive seizure (CMS/HCC)    • Glaucoma    • Hyperlipidemia    • Hypertension    • Seizures (CMS/HCC)      Past Surgical History:   Procedure Laterality Date   • NO PAST SURGERIES          SLP EVALUATION (last 72 hours)      SLP SLC Evaluation     Row Name 21 0800                   Communication Assessment/Intervention    Document Type  evaluation  -CH        Subjective Information  no complaints  -CH        Patient Observations  alert;cooperative confused  -CH        Patient/Family/Caregiver Comments/Observations  no family present  -CH        Patient Effort  adequate  -CH        Symptoms Noted During/After Treatment  none  -CH           General Information    Patient Profile Reviewed  yes  -CH        Prior Level of Function-Communication  unknown  -CH        Patient's Goals for Discharge   patient could not state  -           Comprehension Assessment/Intervention    Comprehension Assessment/Intervention  Auditory Comprehension  -CH           Auditory Comprehension Assessment/Intervention    Auditory Comprehension (Communication)  moderate impairment  -CH        Able to Identify Objects/Pictures (Communication)  unable/difficult to assess  -        Answers Questions (Communication)  yes/no;personal;moderate impairment  -CH        Able to Follow Commands (Communication)  1-step;moderate impairment  -CH        Narrative Discourse  unable/difficult to assess  -           Expression Assessment/Intervention    Expression Assessment/Intervention  verbal expression  -CH           Verbal Expression Assessment/Intervention    Verbal Expression  moderate impairment  -CH        Automatic Speech (Communication)  counting 1-20;days of week;severe impairment  -CH        Repetition  unable/difficult to assess  -        Phrase Completion  automatic/predictable;moderate impairment  -CH        Responsive Naming  unable/difficult to assess  -        Confrontational Naming  high frequency;moderate impairment  -        Spontaneous/Functional Words  mild impairment  -CH        Conversational Discourse/Fluency  unable/difficult to assess  -           Oral Motor Structure and Function    Oral Motor Structure and Function  unable/difficult to assess;other (see comments) did not consistently follow commands to complete  -           Motor Speech Assessment/Intervention    Motor Speech Function  mild impairment  -CH        Characteristics Consistent with Dysarthria  slow rate;slurred speech  -           Cursory Voice Assessment/Intervention    Quality and Resonance (Voice)  WNL  -CH           Cognitive Assessment Intervention- SLP    Cognitive Function (Cognition)  unable/difficult to assess  -CH        Orientation Status (Cognition)  unable/difficult to assess  -CH        Memory (Cognitive)  unable/difficult to  assess  -           SLP Clinical Impressions    SLP Diagnosis  Patient presents with mild dysarthria and moderate language deficits. Cognition not fully assessed d/t language impairment. Patient will require further dx tx for cognition as appropriate.   -        Rehab Potential/Prognosis  good  -        SLC Criteria for Skilled Therapy Interventions Met  yes  -CH        Functional Impact  functional impact in social situations;functional impact in ADLs;unable to make medical decisions;needs 24 hour supervision;difficulty communicating wants, needs;difficulty communicating in an emergency;difficulty in expressing complex messages;restrictions in personal and social life;decreased ability to respond to situations safely;difficulty completing home management task  -           Recommendations    Therapy Frequency (SLP SLC)  5 days per week  -           Communication Treatment Objective and Progress Goals (SLP)    Auditory Comprehension Treatment Objectives  Auditory Comprehension Treatment Objectives (Group)  -        Verbal Expression Treatment Objectives  Verbal Expression Treatment Objectives (Group)  -        Motor Speech/Dysarthria Treatment Objectives  Motor Speech/Dysarthria Treatment Objectives (Group)  -        Cognitive Linguistic Treatment Objectives  Cognitive Linguistic Treatment Objectives (Group)  -        Additional Goals  Additional Goals (Group)  -           Auditory Comprehension Treatment Objectives    Follow Directions Selection  follow directions, SLP goal 1  -           Follow Directions Goal 2 (SLP)    Improve Ability to Follow Directions Goal 1 (SLP)  1 step direction without objects;2 step commands;80%;with minimal cues (75-90%)  -        Time Frame (Follow Directions Goal 1, SLP)  by discharge  -           Verbal Expression Treatment Objectives    Word Retrieval Skills Selection  word retrieval, SLP goal 1  -           Word Retrieval Skills Goal 1 (SLP)    Improve  Word Retrieval Skills By Goal 1 (SLP)  completing automatic speech task, counting;completing automatic speech task, alphabet;completing automatic speech task, days of the week;completing automatic speech task, months;completing automatic speech task, Pledge of Allegiance;completing automatic speech task, sing “Happy Birthday”;high frequency;confrontational naming task;80%;with minimal cues (75-90%)  -CH        Time Frame (Word Retrieval Goal 1, SLP)  by discharge  -CH           Motor Speech/Dysarthria Treatment Objectives    Articulation Selection  articulation, SLP goal 1  -CH           Articulation Goal 1 (SLP)    Improve Articulation Goal 1 (SLP)  by over-articulating at word level;80%;with minimal cues (75-90%)  -CH        Time Frame (Articulation Goal 1, SLP)  by discharge  -           Cognitive Linguistic Treatment Objectives    Attention Selection  attention, SLP goal 1  -CH        Right Hemisphere Function Selection  right hemisphere function, SLP goal 1  -CH           Attention Goal 1 (SLP)    Improve Attention by Goal 1 (SLP)  looking at speaker;attending to task;80%;with minimal cues (75-90%)  -CH        Time Frame (Attention Goal 1, SLP)  by discharge  -           Right Hemisphere Function Goal 1 (SLP)    Improve Right Hemisphere Function Through Goal 1 (SLP)  demonstrate awareness of communication partner in left visual field;80%;with minimal cues (75-90%)  -        Time Frame (Right Hemisphere Function Goal 1, SLP)  by discharge  -           Additional Goals    Additional Goal Selection  additional goal, SLP goal 1  -CH           Additional Goal 1 (SLP)    Additional Goal 1, SLP  LTG: Patient will improve cognitive communication skills to WFL to improve functional communication, safety and independence at the acute level of care.   -CH        Time Frame (Additional Goal 1, SLP)  by discharge  -          User Key  (r) = Recorded By, (t) = Taken By, (c) = Cosigned By    Initials Name Effective  Dates    Eboni Driscoll MS CCC-SLP 06/16/21 -              EDUCATION  The patient has been educated in the following areas:     Cognitive Impairment Communication Impairment.    SLP Recommendation and Plan  SLP Diagnosis: Patient presents with mild dysarthria and moderate language deficits. Cognition not fully assessed d/t language impairment. Patient will require further dx tx for cognition as appropriate.         Swallow Criteria for Skilled Therapeutic Interventions Met: demonstrates skilled criteria  SLC Criteria for Skilled Therapy Interventions Met: yes  Anticipated Discharge Disposition (SLP): unknown, anticipate therapy at next level of care     Therapy Frequency (Swallow): 5 days per week  Predicted Duration Therapy Intervention (Days): until discharge     Plan for Continued Treatment (SLP): Initiate speech, language, cognitive and dysphagia tx                    SLP GOALS     Row Name 07/05/21 0800             Follow Directions Goal 2 (SLP)    Improve Ability to Follow Directions Goal 1 (SLP)  1 step direction without objects;2 step commands;80%;with minimal cues (75-90%)  -CH      Time Frame (Follow Directions Goal 1, SLP)  by discharge  -CH         Word Retrieval Skills Goal 1 (SLP)    Improve Word Retrieval Skills By Goal 1 (SLP)  completing automatic speech task, counting;completing automatic speech task, alphabet;completing automatic speech task, days of the week;completing automatic speech task, months;completing automatic speech task, Pledge of Allegiance;completing automatic speech task, sing “Happy Birthday”;high frequency;confrontational naming task;80%;with minimal cues (75-90%)  -      Time Frame (Word Retrieval Goal 1, SLP)  by discharge  -CH         Articulation Goal 1 (SLP)    Improve Articulation Goal 1 (SLP)  by over-articulating at word level;80%;with minimal cues (75-90%)  -CH      Time Frame (Articulation Goal 1, SLP)  by discharge  -CH         Attention Goal 1 (SLP)    Improve  Attention by Goal 1 (SLP)  looking at speaker;attending to task;80%;with minimal cues (75-90%)  -CH      Time Frame (Attention Goal 1, SLP)  by discharge  -CH         Right Hemisphere Function Goal 1 (SLP)    Improve Right Hemisphere Function Through Goal 1 (SLP)  demonstrate awareness of communication partner in left visual field;80%;with minimal cues (75-90%)  -CH      Time Frame (Right Hemisphere Function Goal 1, SLP)  by discharge  -         Additional Goal 1 (SLP)    Additional Goal 1, SLP  LTG: Patient will improve cognitive communication skills to WFL to improve functional communication, safety and independence at the acute level of care.   -CH      Time Frame (Additional Goal 1, SLP)  by discharge  -        User Key  (r) = Recorded By, (t) = Taken By, (c) = Cosigned By    Initials Name Provider Type    Eboni Driscoll MS CCC-SLP Speech and Language Pathologist                  Time Calculation:     Time Calculation- SLP     Row Name 07/05/21 0929             Time Calculation- SLP    SLP Start Time  0900  -      SLP Received On  07/05/21  -         Untimed Charges    SLP Eval/Re-eval   ST Eval Speech and Production w/ Language - 60300;ST Eval Oral Pharyng Swallow - 78642  -CH      95776-MA Eval Speech and Production w/ Language Minutes  43  -CH      28736-OH Eval Oral Pharyng Swallow Minutes  38  -CH         Total Minutes    Untimed Charges Total Minutes  81  -CH       Total Minutes  81  -CH        User Key  (r) = Recorded By, (t) = Taken By, (c) = Cosigned By    Initials Name Provider Type    Eboni Driscoll MS CCC-SLP Speech and Language Pathologist          Therapy Charges for Today     Code Description Service Date Service Provider Modifiers Qty    24965534572 HC ST EVAL ORAL PHARYNG SWALLOW 3 7/5/2021 Eboni Guillen MS CCC-SLP GN 1    89580311098 HC ST EVAL SPEECH AND PROD W LANG  3 7/5/2021 Eboni Guillen MS CCC-SLP GN 1                     Eboni Guillen MS  CCC-SLP  2021 and Acute Care - Speech Language Pathology   Swallow Initial Evaluation [facility]   Clinical Swallow Evaluation       Patient Name: Jonatan Ocampo  : 1940  MRN: [id number]  Today's Date: 2021               Admit Date: 2021    Visit Dx:     ICD-10-CM ICD-9-CM   1. Altered mental status, unspecified altered mental status type  R41.82 780.97   2. Non-intractable vomiting with nausea, unspecified vomiting type  R11.2 787.01   3. Cerebrovascular accident (CVA), unspecified mechanism (CMS/HCC)  I63.9 434.91   4. Hypertensive emergency  I16.1 401.9   5. History of seizure  Z87.898 V12.49   6. Dysphagia, unspecified type  R13.10 787.20   7. Cognitive communication deficit  R41.841 799.52   8. Dysarthria  R47.1 784.51     Patient Active Problem List   Diagnosis   • Generalized convulsive seizures (CMS/HCC)   • Mass of temporal lobe   • Hyperlipidemia   • Hypertension   • Chronic fatigue   • Suspected cerebrovascular accident (CVA)   • Hypertensive urgency   • Acute respiratory failure with hypoxia (CMS/HCC)   • Altered mental status     Past Medical History:   Diagnosis Date   • Abnormal MRI of head    • Generalized convulsive seizure (CMS/HCC)    • Glaucoma    • Hyperlipidemia    • Hypertension    • Seizures (CMS/HCC)      Past Surgical History:   Procedure Laterality Date   • NO PAST SURGERIES          SWALLOW EVALUATION (last 72 hours)      SLP Adult Swallow Evaluation     Row Name 21 0800                   General Information    Pertinent History Of Current Problem  Patient admitted w AMS, R weakness, dysarthria, aphasia, vomiting. Hx L temporal mass, seizures. CXR: no acute disease., MRI: No acute findings. NIH 11. SLC-E and CSE completed per stroke protocol.   -CH        Current Method of Nutrition  NPO  -CH        Precautions/Limitations, Vision  neglect, left  -CH        Precautions/Limitations, Hearing  WFL;for purposes of eval  -CH        Prior Level of  Function-Communication  unknown  -        Prior Level of Function-Swallowing  unknown  -        Plans/Goals Discussed with  patient  -        Barriers to Rehab  medically complex;cognitive status  -        Patient's Goals for Discharge  patient could not state  -           Pain    Additional Documentation  Pain Scale: FACES Pre/Post-Treatment (Group)  -           Pain Scale: FACES Pre/Post-Treatment    Pain: FACES Scale, Pretreatment  0-->no hurt  -CH        Posttreatment Pain Rating  0-->no hurt  -           Oral Motor Structure and Function    Dentition Assessment  natural, present and adequate  -        Secretion Management  WNL/WFL  -        Mucosal Quality  moist, healthy  -        Volitional Swallow  weak  -           Oral Musculature and Cranial Nerve Assessment    Oral Motor General Assessment  unable to assess patient unable to consistently follow commands to complete  -           General Eating/Swallowing Observations    Respiratory Support Currently in Use  nasal cannula  -        Eating/Swallowing Skills  fed by SLP  -        Positioning During Eating  upright 90 degree;upright in bed  -        Utensils Used  spoon;cup;straw  -        Consistencies Trialed  soft textures;pureed;ice chips;thin liquids;nectar/syrup-thick liquids  -           Respiratory    Respiratory Status  WFL  -           Clinical Swallow Eval    Oral Prep Phase  impaired  -        Oral Transit  impaired  -        Oral Residue  impaired  -        Pharyngeal Phase  suspected pharyngeal impairment  -        Esophageal Phase  unremarkable  -        Clinical Swallow Evaluation Summary  CSE completed. Patient given trials of thin liquids via ice/spoon/cup/straw, NT liquids via spoon/cup, pureed and soft solids. Patient displayed anterior loss with thin liquids via cup and spoon. Cough/throat clear/wet vocal quality with thin liquids via cup and straw. No s/s of aspiration with nectar thick  liquids via spoon or cup. prolonged oral prep with pureed and soft solids. Perseverative mastication without swallow initiation with soft solid trials. REcommend pureed/nectar thick liquids. Meds crushed in pureed.   -           Oral Prep Concerns    Oral Prep Concerns  anterior loss;prolonged mastication;increased prep time;bolus removed from mouth manually  -        Prolonged Mastication  mechanical soft  -        Anterior Loss  thin  -        Increased Prep Time  pudding;mechanical soft  -CH        Bolus Removed from Mouth Manually  mechanical soft  -CH        Oral Prep Concerns, Comment  Cognition impacting   -           Oral Transit Concerns    Oral Transit Concerns  increased oral transit time  -        Increased Oral Transit Time  mechanical soft  -CH           Oral Residue Concerns    Oral Residue Concerns  diffuse residue throughout oral cavity  -        Diffuse Residue Throughout Oral Cavity  mechanical soft  -CH           Pharyngeal Phase Concerns    Pharyngeal Phase Concerns  wet vocal quality;cough;throat clear  -        Wet Vocal Quality  thin  -CH        Cough  thin  -CH        Throat Clear  thin  -CH           Clinical Impression    Barriers to Overall Progress (SLP)  cognition   -        SLP Swallowing Diagnosis  mild-moderate;oral dysphagia;suspected pharyngeal dysphagia  -        Functional Impact  risk of aspiration/pneumonia;risk of malnutrition;risk of dehydration  -        Rehab Potential/Prognosis, Swallowing  adequate, monitor progress closely  -        Swallow Criteria for Skilled Therapeutic Interventions Met  demonstrates skilled criteria  -        Plan for Continued Treatment (SLP)  Initiate speech, language, cognitive and dysphagia tx  -           Recommendations    Therapy Frequency (Swallow)  5 days per week  -        Predicted Duration Therapy Intervention (Days)  until discharge  -        SLP Diet Recommendation  puree;nectar thick liquids  -         Recommended Diagnostics  reassess via clinical swallow evaluation  -        Recommended Precautions and Strategies  upright posture during/after eating;small bites of food and sips of liquid  -        Oral Care Recommendations  Oral Care BID/PRN  -        SLP Rec. for Method of Medication Administration  meds crushed;with pudding or applesauce;as tolerated  -        Monitor for Signs of Aspiration  yes;notify SLP if any concerns  -        Anticipated Discharge Disposition (SLP)  unknown;anticipate therapy at next level of care  -          User Key  (r) = Recorded By, (t) = Taken By, (c) = Cosigned By    Initials Name Effective Dates     Eboni Guillen, MS CCC-SLP 06/16/21 -           EDUCATION  The patient has been educated in the following areas:   Dysphagia (Swallowing Impairment).    SLP Recommendation and Plan  SLP Swallowing Diagnosis: mild-moderate, oral dysphagia, suspected pharyngeal dysphagia  SLP Diet Recommendation: puree, nectar thick liquids  Recommended Precautions and Strategies: upright posture during/after eating, small bites of food and sips of liquid  SLP Rec. for Method of Medication Administration: meds crushed, with pudding or applesauce, as tolerated     Monitor for Signs of Aspiration: yes, notify SLP if any concerns  Recommended Diagnostics: reassess via clinical swallow evaluation  Swallow Criteria for Skilled Therapeutic Interventions Met: demonstrates skilled criteria  Anticipated Discharge Disposition (SLP): unknown, anticipate therapy at next level of care  Rehab Potential/Prognosis, Swallowing: adequate, monitor progress closely  Therapy Frequency (Swallow): 5 days per week  Predicted Duration Therapy Intervention (Days): until discharge          Plan for Continued Treatment (SLP): Initiate speech, language, cognitive and dysphagia tx                   SLP GOALS     Row Name 07/05/21 0800             Follow Directions Goal 2 (SLP)    Improve Ability to Follow  Directions Goal 1 (SLP)  1 step direction without objects;2 step commands;80%;with minimal cues (75-90%)  -CH      Time Frame (Follow Directions Goal 1, SLP)  by discharge  -CH         Word Retrieval Skills Goal 1 (SLP)    Improve Word Retrieval Skills By Goal 1 (SLP)  completing automatic speech task, counting;completing automatic speech task, alphabet;completing automatic speech task, days of the week;completing automatic speech task, months;completing automatic speech task, Pledge of Allegiance;completing automatic speech task, sing “Happy Birthday”;high frequency;confrontational naming task;80%;with minimal cues (75-90%)  -CH      Time Frame (Word Retrieval Goal 1, SLP)  by discharge  -CH         Articulation Goal 1 (SLP)    Improve Articulation Goal 1 (SLP)  by over-articulating at word level;80%;with minimal cues (75-90%)  -CH      Time Frame (Articulation Goal 1, SLP)  by discharge  -CH         Attention Goal 1 (SLP)    Improve Attention by Goal 1 (SLP)  looking at speaker;attending to task;80%;with minimal cues (75-90%)  -CH      Time Frame (Attention Goal 1, SLP)  by discharge  -CH         Right Hemisphere Function Goal 1 (SLP)    Improve Right Hemisphere Function Through Goal 1 (SLP)  demonstrate awareness of communication partner in left visual field;80%;with minimal cues (75-90%)  -CH      Time Frame (Right Hemisphere Function Goal 1, SLP)  by discharge  -         Additional Goal 1 (SLP)    Additional Goal 1, SLP  LTG: Patient will improve cognitive communication skills to WFL to improve functional communication, safety and independence at the acute level of care.   -      Time Frame (Additional Goal 1, SLP)  by discharge  -CH        User Key  (r) = Recorded By, (t) = Taken By, (c) = Cosigned By    Initials Name Provider Type    Eboni Driscoll MS CCC-SLP Speech and Language Pathologist             Time Calculation:   Time Calculation- SLP     Row Name 07/05/21 0338             Time  Calculation- SLP    SLP Start Time  0900  -CH      SLP Received On  07/05/21  -CH         Untimed Charges    SLP Eval/Re-eval   ST Eval Speech and Production w/ Language - 85556;ST Eval Oral Pharyng Swallow - 66926  -CH      51271-AW Eval Speech and Production w/ Language Minutes  43  -CH      55904-SV Eval Oral Pharyng Swallow Minutes  38  -CH         Total Minutes    Untimed Charges Total Minutes  81  -CH       Total Minutes  81  -CH        User Key  (r) = Recorded By, (t) = Taken By, (c) = Cosigned By    Initials Name Provider Type     Eboni Guillen MS CCC-SLP Speech and Language Pathologist          Therapy Charges for Today     Code Description Service Date Service Provider Modifiers Qty    30819056258 HC ST EVAL ORAL PHARYNG SWALLOW 3 7/5/2021 Eboni Guillen MS CCC-SLP GN 1    [id number] HC ST EVAL SPEECH AND PROD W LANG  3 7/5/2021 Eboni Guillen MS CCC-SLP GN 1               Eboni Guillen MS CCC-JENNIE  7/5/2021

## 2021-07-05 NOTE — H&P (VIEW-ONLY)
[facility] Medicine Services  HISTORY AND PHYSICAL    Patient Name: Jonatan Ocampo  : 1940  MRN: [id number]  Primary Care Physician: Floyd Heard MD  Date of admission: 2021      Subjective   Subjective     Chief Complaint:  Stroke like symptoms     HPI:    Jonatan Ocampo is an 81yoM with h/o left temporal mass, seizures, HTN, HLD who presented to the [facility] ED 7/5 AM as a code stroke, with altered mental status, right sided weakness, dysarthria and aphasia. Upon my evaluation, patient has required restraints due to attempting to get out of bed numerous times, pulling at lines etc.. no family is present during my assessment and patient is unable to participate in history. Per ED note- Patient also noted to have vomiting and hypoxia in the ED- requiring 2-3 L on no home baseline.     Was evaluated by the stroke team in the ED, CT imaging with left temporal lobe mass c/w prior MRI, MRI imaging did not reveal acute CVA. Labs otherwise notable for D-dimer of 9.5, ABG with pO2 76 and glucose 178, otherwise blood work is normal. Patient currently admitted for ongoing stroke w/u and w/u of altered mental status.         COVID Details:    Symptoms:    [x] NONE [] Fever []  Cough [] Shortness of breath [] Change in taste/smell      The patient qualifies to receive the vaccine, but they have not yet received it.      Review of Systems   Unable to assess given mentation  All other systems reviewed and are negative.     Personal History     Past Medical History:   Diagnosis Date   • Abnormal MRI of head    • Generalized convulsive seizure (CMS/HCC)    • Glaucoma    • Hyperlipidemia    • Hypertension    • Seizures (CMS/HCC)        Past Surgical History:   Procedure Laterality Date   • NO PAST SURGERIES         Family History family history includes Alzheimer's disease in his mother; Cancer in his mother; Dementia in his mother; Depression in his mother. Otherwise pertinent FHx was  reviewed and unremarkable.     Social History:  reports that he has never smoked. He has never used smokeless tobacco. He reports that he does not drink alcohol and does not use drugs.  Social History     Social History Narrative   • Not on file       Medications:  Available home medication information reviewed.  Medications Prior to Admission   Medication Sig Dispense Refill Last Dose   • timolol (TIMOPTIC) 0.25 % ophthalmic solution 1 drop 2 (Two) Times a Day.      • fluticasone (FLONASE) 50 MCG/ACT nasal spray into each nostril.      • latanoprost (XALATAN) 0.005 % ophthalmic solution       • levETIRAcetam (KEPPRA) 500 MG tablet Take 0.5 tablets by mouth Every 12 (Twelve) Hours. 180 tablet 5    • lisinopril (PRINIVIL,ZESTRIL) 20 MG tablet Take  by mouth.      • Polyethyl Glycol-Propyl Glycol (SYSTANE OP) Systane (propylene glycol)          Allergies   Allergen Reactions   • Sulfa Antibiotics Hives     RASH       Objective   Objective     Vital Signs:   Temp:  [98.3 °F (36.8 °C)-98.6 °F (37 °C)] 98.3 °F (36.8 °C)  Heart Rate:  [] 108  Resp:  [16] 16  BP: (155-243)/() 157/84  Flow (L/min):  [3-4] 3  Total (NIH Stroke Scale): 11    Physical Exam   GEN- resting in bed, in soft restraints , appears comfortable  HEENT- atraumatic, speech very difficult to understand but is alert   NECK- supple, trachea midline, no masses  RESP: slightly diminished effort but no wheezing, on 3L NC   CV: no murmurs, s1/s2, rrr  MSK: no edema noted, spontaneous movement of all extremities  NEURO: alert but unable to participate fully in neuro exam, moving all extremities  SKIN: no rashes  PSYCH: uto      Result Review:  I have personally reviewed the results from the time of this admission to 7/5/2021 11:39 EDT and agree with these findings:  [x]  Laboratory  []  Microbiology  [x]  Radiology  [x]  EKG/Telemetry   []  Cardiology/Vascular   []  Pathology  [x]  Old records  []  Other:  Most notable findings include:     Imaging  findings as above      LAB RESULTS:      Lab 07/05/21  0702 07/05/21  0431 07/05/21 0427   WBC  --  8.13  --    HEMOGLOBIN  --  14.0  --    HEMOGLOBIN, POC  --   --  13.9   HEMATOCRIT  --  41.1  --    HEMATOCRIT POC  --   --  41   PLATELETS  --  223  --    NEUTROS ABS  --  5.58  --    IMMATURE GRANS (ABS)  --  0.04  --    LYMPHS ABS  --  1.88  --    MONOS ABS  --  0.48  --    EOS ABS  --  0.13  --    MCV  --  91.5  --    PROCALCITONIN  --  0.08  --    D DIMER QUANT 9.53*  --   --          Lab 07/05/21 0431 07/05/21 0427   SODIUM 140  --    POTASSIUM 3.9  --    CHLORIDE 106  --    CO2 22.0  --    ANION GAP 12.0  --    BUN 12  --    CREATININE 0.97 1.00   GLUCOSE 178*  --    CALCIUM 8.7  --    TSH 3.950  --          Lab 07/05/21 0431   TOTAL PROTEIN 7.2   ALBUMIN 3.90   GLOBULIN 3.3   ALT (SGPT) 14   AST (SGOT) 22   BILIRUBIN 0.6   ALK PHOS 61         Lab 07/05/21 0431   PROBNP 360.1   TROPONIN T <0.010                 Lab 07/05/21 0427   PH, ARTERIAL 7.43         Microbiology Results (last 10 days)     ** No results found for the last 240 hours. **          CT Angiogram Neck    Result Date: 7/5/2021  CTA HEAD AND NECK WITH AI ANALYSIS FOR LVO, 7/5/2021 HISTORY: 81-year-old male in the ED with new onset decreased responsiveness. Right side facial droop, difficulty with speech. 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 TECHNIQUE: CT angiogram of the head and neck with contrast. CTA images were reformatted with 3-D postprocessing. Evaluation for a significant carotid arterial stenosis is based on NASCET criteria. Radiation dose reduction techniques included automated exposure control. Radiation audit for known CT and nuclear cardiology exams in the last 12 months: 0. AI analysis for LVO was utilized. COMPARISON: CT brain and CT perfusion exams tonight. CTA NECK: Normal aortic arch with no proximal great vessel stenosis. The vertebral  arteries are widely patent with right vertebral dominance. Cervical carotid bifurcations are widely patent with 0% stenosis in both internal carotid arteries by NASCET criteria. CTA HEAD: Intracranially, there is symmetric distal vascular contrast distribution in the anterior, middle and posterior cerebral artery territories. Fetal type supply of both posterior cerebral arteries. Moderately severe focal atherosclerotic stenosis of the right P1 PCA segment. No additional evidence of intracranial arterial flow limiting stenosis. No large vessel occlusion. No intracranial aneurysm or vascular malformation is identified. Dural venous sinuses appear normal. No abnormal enhancement involving the infiltrative left temporal lobe tumor.     Impression: 1.  No significant extracranial carotid or vertebral artery stenosis. 2.  Moderately severe focal stenosis of the right P1 PCA segment, likely atherosclerotic. Bilateral fetal-type PCom supply both posterior cerebral arteries. No additional evidence of intracranial flow limiting stenosis or large vessel occlusion. Signer Name: Dain Kirk MD  Signed: 7/5/2021 5:12 AM  Workstation Name: [email or web address]  [facility]    MRI Brain With & Without Contrast    Result Date: 7/5/2021  MRI Brain WO W INDICATION: Altered state, nausea and vomiting, generalized weakness, history of seizures, unable to ambulate TECHNIQUE: MRI of the brain with and without IV contrast. A total of 18 cc MultiHance IV contrast was administered. COMPARISON:  CT head and CTA head and neck 7/5/2021 and MRI brain 3/16/2021 FINDINGS: Left temporal lobe somewhat appears expansile with FLAIR signal hyperintensity within the anterior temporal lobe, amygdala, medial temporal lobe, and extending into the left insular region. On the postcontrast acquisitions there is marked motion degradation which limits sensitivity for detecting enhancing intracranial lesion. No definite enhancement is  identified. The diffusion-weighted imaging demonstrates no evidence of acute or early subacute infarct. No intracranial hemorrhage. There are scattered T2/FLAIR signal hyperintensities within the bilateral cerebral and deep white matter which are nonspecific but most commonly associated with chronic microvascular ischemic change. No hydrocephalus. Trace fluid in the mastoid air cells.     Impression: 1.  Allowing for motion degradation, no acute intracranial abnormality or significant interval change. Specifically, there is no evidence of acute or early subacute infarct. 2.  Expansile appearance of the left medial temporal lobe extending into the left insula is unchanged, reportedly consistent with patient's history of low-grade glioma. Signer Name: ZACKERY JOHN MD  Signed: 7/5/2021 7:05 AM  Workstation Name: [facility]  [facility]    XR Chest 1 View    Result Date: 7/5/2021  CHEST X-RAY, 7/5/2021  HISTORY:  81-year-old male in the ED undergoing stroke protocol assessment.  TECHNIQUE: AP portable chest x-ray.  FINDINGS: No definite active disease in the chest. No visible pulmonary infiltrate, pulmonary edema or pleural effusion. Moderate cardiomegaly. Tortuous thoracic aorta. Spinal curvature.     Impression: No active disease. Signer Name: Dain Kirk MD  Signed: 7/5/2021 5:13 AM  Workstation Name: [facility]  [facility]    CT Head Without Contrast Stroke Protocol    Result Date: 7/5/2021  CT HEAD, NONCONTRAST, 7/5/2021 HISTORY: 81-year-old male in the ED undergoing acute stroke evaluation. New onset decreased responsiveness. 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 TECHNIQUE: CT imaging of the head without IV contrast. Radiation dose reduction techniques included automated exposure control. Radiation audit for CT and nuclear cardiology exams in the last 12 months: 0. COMPARISON: *   MRI brain, 3/16/2021 and 1/9/2020. FINDINGS: Low-attenuation changes within the medial left temporal lobe extending to the left insula corresponding to the infiltrative mass best seen on prior MR imaging. This is grossly unchanged. No clearly acute intracranial abnormality. Mild generalized age-appropriate cerebral volume loss. Moderate diffuse chronic small vessel type white matter changes. Old lacunar infarct involving the periventricular body of the right caudate nucleus. These findings are unchanged. No evidence of acute intracranial hemorrhage. No increasing mass effect or cerebral edema. No extra-axial fluid collection or increasing ventricular enlargement.     Impression: 1.  No clearly acute intracranial abnormality. 2.  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 3.  Stable diffuse chronic changes as noted above. NOTIFICATION: Critical Value/emergent results were relayed from me to the ED treatment team by telephone through the CT technologist, [place], at 04:30 on 7/5/2021. Signer Name: Dain Kirk MD  Signed: 7/5/2021 4:35 AM  Workstation Name: [email or web address]  [facility] [place]    CT Angiogram Head w AI Analysis of LVO    Result Date: 7/5/2021  CTA HEAD AND NECK WITH AI ANALYSIS FOR LVO, 7/5/2021 HISTORY: 81-year-old male in the ED with new onset decreased responsiveness. Right side facial droop, difficulty with speech. 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 TECHNIQUE: CT angiogram of the head and neck with contrast. CTA images were reformatted with 3-D postprocessing. Evaluation for a significant carotid arterial stenosis is based on NASCET criteria. Radiation dose reduction techniques included automated exposure control. Radiation audit for known CT and nuclear cardiology exams in the  last 12 months: 0. AI analysis for LVO was utilized. COMPARISON: CT brain and CT perfusion exams tonight. CTA NECK: Normal aortic arch with no proximal great vessel stenosis. The vertebral arteries are widely patent with right vertebral dominance. Cervical carotid bifurcations are widely patent with 0% stenosis in both internal carotid arteries by NASCET criteria. CTA HEAD: Intracranially, there is symmetric distal vascular contrast distribution in the anterior, middle and posterior cerebral artery territories. Fetal type supply of both posterior cerebral arteries. Moderately severe focal atherosclerotic stenosis of the right P1 PCA segment. No additional evidence of intracranial arterial flow limiting stenosis. No large vessel occlusion. No intracranial aneurysm or vascular malformation is identified. Dural venous sinuses appear normal. No abnormal enhancement involving the infiltrative left temporal lobe tumor.     Impression: 1.  No significant extracranial carotid or vertebral artery stenosis. 2.  Moderately severe focal stenosis of the right P1 PCA segment, likely atherosclerotic. Bilateral fetal-type PCom supply both posterior cerebral arteries. No additional evidence of intracranial flow limiting stenosis or large vessel occlusion. Signer Name: Dain Kirk MD  Signed: 7/5/2021 5:12 AM  Workstation Name: [email or web address]-  [facility]    CT CEREBRAL PERFUSION WITH & WITHOUT CONTRAST    Result Date: 7/5/2021  CT CEREBRAL PERFUSION, WITH AND WITHOUT CONTRAST, 7/5/2021 HISTORY: 81-year-old male in the ED undergoing acute stroke evaluation. New onset decreased responsiveness. Right side facial droop, difficulty with speech. 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 TECHNIQUE: Axial CT images of the brain without and with intravenous contrast using cerebral perfusion protocol. Post-processing parametric maps were  created using RAPID software and reviewed. Radiation dose reduction techniques included automated exposure control or exposure modulation based on body size. CT and nuclear cardiology exams in the last 12 months: 0. COMPARISON: CT head, tonight. MRI brain, 3/16/2021 and 1/9/2020. FINDINGS: Arterial input function is optimal. There is no convincing evidence of cerebral ischemia or infarction. There is some flow asymmetry in the region of the low medial left temporal lobe and insular region, particularly on the Tmax map, that is likely attributable to known infiltrative tumor in this region. This region is also identified as abnormal on the CT attenuation hypodensity map.     Impression: 1.  No convincing evidence of acute cerebral ischemia. 2.  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 3.  Consider repeat MR imaging of the brain. Signer Name: Dain Kirk MD  Signed: 7/5/2021 5:00 AM  Workstation Name: [email or web address]  [facility] [place]          Assessment/Plan   Assessment & Plan     Active Hospital Problems    Diagnosis  POA   • **Suspected cerebrovascular accident (CVA) [R09.89]  Yes   • Hypertensive urgency [I16.0]  Yes   • Acute respiratory failure with hypoxia (CMS/HCC) [J96.01]  Yes   • Altered mental status [R41.82]  Yes   • Hypertension [I10]  Yes   • Hyperlipidemia [E78.5]  Yes   • Generalized convulsive seizures (CMS/HCC) [R56.9]  Yes       Jonatan Ocampo is an 81yoM with h/o left temporal mass, seizures, HTN, HLD who presented to the [facility] ED 7/5 AM as a code stroke, with altered mental status, right sided weakness, dysarthria and aphasia. Patient also noted to have vomiting and hypoxia in the ED- requiring 2-3 L on no home baseline. Was evaluated by the stroke team in the ED, CT imaging with left temporal lobe mass c/w prior MRI, MRI imaging did not reveal acute CVA. Patient  currently admitted for ongoing stroke w/u and w/u of altered mental status.     Altered mental status/right extremity weakness  Seizure versus CVA   -Already loaded with Keppra 1 g, continue home dose.  -MRI negative for acute CVA as above  -EEG, seizure precautions, restraints as needed   --general neurology to see      Hypoxia-requiring 2 to 3 L of oxygen to maintain sats around 92%, with no known history of lung disease, denies any complaint of cough.  Elevated D-dimer   -?  Aspiration pneumonia-continue to monitor O2 supplementation.  --also concern of possible PE given elevated D-dimer- will order CTPE protocol to r/o   --covid screen ordered and pending though low suspicion    Addendum- CTA negative, but patient noted to have worsening hypoxia/resp status following dinner (after which he was ordered modified diet with SLP). Nursing noted they were concern about aspiration and patient had episode of vomiting. CXR obtained with ? New infiltrate on right. Will start zosyn. Keep NPO. Watch oxygen status closely.      Glaucoma-on timolol eyedrops,-continue home drops     Hypertension-- uncontrolled  -lisinopril 20 mg daily-currently uncontrolled, on Cardene drip will continue, troponin less than 0.01, EKG  Sinus rhythm 75 bpm, , LVH criteria, no acute ST-T wave changes.     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  Patient follows up with Dr. Hurt. Continue Keppra as above     Hyperlipidemia-not on statin    DVT prophylaxis:  Add lovenox as MRI negative for acute CVA-- pending CTPE above     Attempted to call spouse, Roxy Ocampo at both numbers listed at 1234pm, no answer    D/w spouse Roxy at bedside around 2pm. She reports patient was in normal state of health until last evening after going to a BBQ while eating hot dog, potato salad. Later on reports vomiting and confusion.  Ultimately confusion and inability to communicate with him brought him to ED      CODE STATUS:  full  Code Status and Medical Interventions:   Ordered at: 07/05/21 0604     Level Of Support Discussed With:    Patient     Code Status:    CPR     Medical Interventions (Level of Support Prior to Arrest):    Full       Admission Status:  I believe this patient meets INPATIENT status due to workup as above/altered mentation.  I feel patient’s risk for adverse outcomes and need for care warrant INPATIENT evaluation and I predict the patient’s care encounter to likely last beyond 2 midnights.      Anna Stanford MD  07/05/21

## 2021-07-06 NOTE — NURSING NOTE
EEG tech at bedside to check skin integrity. Pt resting at this time and being moved to 6B soon. Skin check with no issues.Explained to family video/audio recording.

## 2021-07-06 NOTE — NURSING NOTE
Pt. Started with routine EEG however changed to continuous recording. No skin redness noted. Video/audio recording. Pt. Nonverbal at this time. No family at bedside.

## 2021-07-06 NOTE — PLAN OF CARE
Problem: Adult Inpatient Plan of Care  Goal: Plan of Care Review  Outcome: Ongoing, Progressing   Goal Outcome Evaluation:                 Patient transferred to  with 3E nurse at bedside neuro status remains the same per nurse, NG damaged and replaced and KUB obtained, transferred for continuous EEG, EEG tech made aware of transfer, patient suctioned due to secretions.  Spouse at bedside.  Still having Q4 neuro checks.  Will continue to monitor.

## 2021-07-06 NOTE — NURSING NOTE
Pt was feeding dinner by wife and she called me in there stating he made a growling noise, he was gurgling and 02 sat's were in the low 80's, he was orally suctioned, I called Tamela POND and she suctioned him nasally for copious amount of dark brown fluid, we continued to work with him, encourager him to cough and he was placed on a nonrebreather @ 9 liters, he eventually sated at 92%, with audible crackles in upper bilateral lobes. Charge was notified as well as Dr Stanford and xray was ordered.

## 2021-07-06 NOTE — PROGRESS NOTES
Critical care    Called due to worsening hypoxia/oxygen requirements, w/ some abdominal distension.   Earlier today vomited prior to coming to hospital (per wife report) and was confused w/ some R>L right facial droop and was vomiting in ED as well.  Was seen by stroke navigator, mri negative for stroke or acute process, did show stable left medial temporal lobe mass stable from prior.  Was evaluated by stroke team, initially, then seen by Dr. Martinez of general neurology as was felt to be seizure. Ct angio chest showed no PE and some atelectasis. Was loaded 1/ 1000mg keppra x 1 this morning, then another 1000mg, as well as vimpat 200mg. Throughout this afternoon was requiring ~3 liters this afternoon. Wife says she fed patient after was cleared to eat, then developed worsening hypoxia and rhonchi after 6:30 pm currently on nonrebreather mask (~80% fio2 satting ~89%). Repeat cxr w/ worsening/evolving right basilar infiltrate.     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 (ileus)  -martini cath  -LR 100cc/hr (gove lasix earlier while felt was overloaded), monitor renal function, track lactate  -zosyn  -aspiration precautions  -nebs, suctioning prn  -wean  "oxygen as able (currently on mask fio2 ~70-80% fio2)  -continue keppra per neuro (also got vimpat iv and load of keppra in ed); eeg pending  -echo in a.m.  -glycerin suppository tonight (may require enema)      Am labs ordered    High risk further clinical decompensation, criticall ill. 46 minutes spent  On critical care thus far this evening. If any further decompensation will contact icu for transfer. I spoke to wife via phone this evening and confirmed full code status.    Addendum @ 00:03:  -nurse placed martini, returned ~900cc yellow urine  -placing ng soon  -still satting 91-93% w/ the mask w/ one \"flap\" in place (~70-80% fio2)    Addendum @ 04:23:  -ng tube placed, ~100cc out thus far brownish output initially, now more clear but nurse. Had had few liquid bm's.   -repeat lactate down to 2.3 (from 3.0)          "

## 2021-07-06 NOTE — PROGRESS NOTES
[facility] Medicine Services  PROGRESS NOTE    Patient Name: Jonatan Ocampo  : 1940  MRN: [id number]    Date of Admission: 2021  Primary Care Physician: Floyd Heard MD    Subjective   Subjective     CC:  Seizures/AMS    HPI:  Multiple overnight events.   D/w family in depth as well as Dr Solomon.  Moving to cEEG floor as concern for ongoing seizures. Remains on 5L NC, NGT in place with output noted.   D/w family code status    ROS:  uto    Objective   Objective     Vital Signs:   Temp:  [98.4 °F (36.9 °C)-100.7 °F (38.2 °C)] 98.4 °F (36.9 °C)  Heart Rate:  [72-99] 72  Resp:  [16-26] 20  BP: (119-186)/() 119/74  Total (NIH Stroke Scale): 9     Physical Exam:  GEN- resting in bed, nonverbal, appears ill  HEENT- atraumatic, normocephalic, eomi, NGT in place  NECK- supple, trachea midline, no masses  RESP: on 5LNC, rhonchorus bs  CV: no murmurs, s1/s2, rrr, hypertensive  MSK: no edema noted, spontaneous movement of all extremities  NEURO: unable to understand speech and mostly nonverbal, does not follow commands with gaze palsey  SKIN: no rashes  PSYCH: uto      Results Reviewed:  Results from last 7 days   Lab Units 21   WBC 10*3/mm3 18.79* 16.62* 8.13   HEMOGLOBIN g/dL 17.4 17.2 14.0   HEMATOCRIT % 54.3* 49.8 41.1   PLATELETS 10*3/mm3 225 237 223   PROCALCITONIN ng/mL  --  2.65* 0.08     Results from last 7 days   Lab Units 21   SODIUM mmol/L 140 139 140   POTASSIUM mmol/L 4.2 4.6 3.9   CHLORIDE mmol/L 105 103 106   CO2 mmol/L 18.0* 22.0 22.0   BUN mg/dL 33* 28* 12   CREATININE mg/dL 2.34* 2.29* 0.97   GLUCOSE mg/dL 144* 176* 178*   CALCIUM mg/dL 8.9 8.7 8.7   ALT (SGPT) U/L 19  --  14   AST (SGOT) U/L 38  --  22   TROPONIN T ng/mL  --   --  <0.010   PROBNP pg/mL  --  8,004.0* 360.1     Estimated Creatinine Clearance: 26.9 mL/min (A) (by C-G formula based on SCr of 2.34 mg/dL  (H)).    Microbiology Results Abnormal     Procedure Component Value - Date/Time    COVID PRE-OP / PRE-PROCEDURE SCREENING ORDER (NO ISOLATION) - Swab, Nasopharynx [[id number]]  (Normal) Collected: 07/05/21 1139    Lab Status: Final result Specimen: Swab from Nasopharynx Updated: 07/05/21 1254    Narrative:      The following orders were created for panel order COVID PRE-OP / PRE-PROCEDURE SCREENING ORDER (NO ISOLATION) - Swab, Nasopharynx.  Procedure                               Abnormality         Status                     ---------                               -----------         ------                     COVID-19,CEPHEID,BRIANNA IN-...[[id number]]  Normal              Final result                 Please view results for these tests on the individual orders.    COVID-19,CEPHEID,BRIANNA IN-HOUSE(OR EMERGENT/ADD-ON),NP SWAB IN TRANSPORT MEDIA 3-4 HR TAT - Swab, Nasopharynx [[id number]]  (Normal) Collected: 07/05/21 1139    Lab Status: Final result Specimen: Swab from Nasopharynx Updated: 07/05/21 1254     COVID19 Not Detected    Narrative:      Fact sheet for providers: https://www.fda.gov/media/706554/download     Fact sheet for patients: https://www.fda.gov/media/386732/download  Fact sheet for providers: https://www.fda.gov/media/306410/download     Fact sheet for patients: https://www.fda.gov/media/117125/download          Imaging Results (Last 24 Hours)     Procedure Component Value Units Date/Time    XR Chest 1 View [[id number]] Collected: 07/05/21 1847     Updated: 07/06/21 0857    Narrative:      EXAMINATION: XR CHEST 1 VW-      INDICATION: Concern for aspiration; R41.82-Altered mental status,  unspecified; R11.2-Nausea with vomiting, unspecified; I63.9-Cerebral  infarction, unspecified; I16.1-Hypertensive emergency; Z87.898-Personal  history of other specified conditions; R13.10-Dysphagia, unspecified;  R41.841-Cognitive communication deficit; R47.1-Dysarthria and anarthria.        COMPARISON: 07/05/2021.      FINDINGS: Portable chest reveals heart to be borderline enlarged. Very  minimal increased markings are identified at the right lung base in  which early infiltrate cannot be completely excluded. Continued followup  is recommended. Degenerative change is seen within the spine. No  definite pleural effusion.           Impression:      Slight increase seen in the markings at the right lung base  in which early infiltrate cannot be excluded. Continued followup is  recommended as indicated.     D:  07/05/2021  E:  07/06/2021          XR Abdomen KUB [[id number]] Collected: 07/06/21 0328     Updated: 07/06/21 0330    Narrative:      ABDOMEN, 7/6/2021 (02:36)      HISTORY:    NG tube placement.      TECHNIQUE:  AP portable radiograph of the upper abdomen.      FINDINGS:  NG tube tip is in the midportion of the stomach. The stomach is decompressed.    Signer Name: Dain Kirk MD   Signed: 7/6/2021 3:28 AM   Workstation [facility]: [email or web address]    [facility]    CT Chest Without Contrast Diagnostic [[id number]] Collected: 07/05/21 2236     Updated: 07/05/21 2238    Narrative:      CT CHEST, ABDOMEN AND PELVIS, NONCONTRAST, 7/5/2021    HISTORY:  81-year-old male with past history of temporal lobe mass, seizures admitted to the hospital yesterday with vomiting and hypoxia. Concern for aspiration. Acute stroke assessment yesterday was negative.    TECHNIQUE:  CT imaging of the chest, abdomen and pelvis without IV contrast. Radiation dose reduction techniques included automated exposure control. Radiation audit for CT and nuclear cardiology exams in the last 12 months: 6.    COMPARISON:  *  CTA chest earlier today.    CHEST FINDINGS:  The previous examination today showed fluid distended stomach and hiatal hernia with a mildly dilated, fluid-filled thoracic esophagus. On the current study, the stomach and hiatal hernia are partially decompressed, and esophageal dilatation is mild.  This may have been  decompressed through vomiting. No NG tube is present.    There is mild hazy airspace infiltrate in the infrahilar portions of both lungs that is new since the earlier study. Early aspiration pneumonia is a consideration given the history. Atelectasis in the dependent posterior costophrenic angles is unchanged.  The nondependent portions of both lungs remain clear. There is no pleural effusion. Trachea and mainstem bronchi are patent. No mass or adenopathy is seen within the chest. Heart size is normal, there is no pericardial effusion. Normal caliber thoracic  aorta.    RENAL/URINARY FINDINGS:  The urinary bladder is markedly distended with estimated volume 930 cc. There is slight dilatation of the right and left renal collecting systems, and soft tissue stranding surrounds both kidneys.. 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 Correlate  for urinary retention, bladder outflow obstruction. Bilateral small benign renal cysts are present. There is an indeterminate right mid renal mass measuring about 3.0 cm which could represent an atypical cyst or a solid mass. Recommend follow-up renal  ultrasound examination. 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 Chronic left upper pole renal parenchymal scarring.    ABDOMEN FINDINGS:  Small stone within nondistended gallbladder. No bile duct dilatation. Liver, pancreas and spleen are unremarkable. Normal caliber abdominal aorta. Small bowel and colon are normal in caliber and appearance.    PELVIS FINDINGS:  Enlarged prostate with benign-appearing calcifications. Moderately large volume stool distending the rectum. No free pelvic fluid. Severe degenerative disc disease and facet arthropathy at L5-S1.      Impression:      1.  Fluid distended stomach, fluid-filled hiatal hernia and fluid dilated thoracic esophagus present on the earlier study today, now partially decompressed.  2.  New mild airspace infiltrates in the posterior  infrahilar lower lobes. Given the history, early aspiration pneumonia is a consideration.  3.  Suspected bladder outflow obstruction with markedly distended urinary bladder (930 cc), mild bilateral renal collecting system dilatation and perinephric soft tissue stranding. Enlarged prostate with possible TURP defect centrally.  4.  Stool distended rectum.  5.  Cholelithiasis.    Signer Name: Dain Kirk MD   Signed: 7/5/2021 10:36 PM   Workstation Name: [email or web address]    [facility]    CT Abdomen Pelvis Without Contrast [[id number]] Collected: 07/05/21 2236     Updated: 07/05/21 2238    Narrative:      CT CHEST, ABDOMEN AND PELVIS, NONCONTRAST, 7/5/2021    HISTORY:  81-year-old male with past history of temporal lobe mass, seizures admitted to the hospital yesterday with vomiting and hypoxia. Concern for aspiration. Acute stroke assessment yesterday was negative.    TECHNIQUE:  CT imaging of the chest, abdomen and pelvis without IV contrast. Radiation dose reduction techniques included automated exposure control. Radiation audit for CT and nuclear cardiology exams in the last 12 months: 6.    COMPARISON:  *  CTA chest earlier today.    CHEST FINDINGS:  The previous examination today showed fluid distended stomach and hiatal hernia with a mildly dilated, fluid-filled thoracic esophagus. On the current study, the stomach and hiatal hernia are partially decompressed, and esophageal dilatation is mild.  This may have been decompressed through vomiting. No NG tube is present.    There is mild hazy airspace infiltrate in the infrahilar portions of both lungs that is new since the earlier study. Early aspiration pneumonia is a consideration given the history. Atelectasis in the dependent posterior costophrenic angles is unchanged.  The nondependent portions of both lungs remain clear. There is no pleural effusion. Trachea and mainstem bronchi are patent. No mass or adenopathy is seen within the  chest. Heart size is normal, there is no pericardial effusion. Normal caliber thoracic  aorta.    RENAL/URINARY FINDINGS:  The urinary bladder is markedly distended with estimated volume 930 cc. There is slight dilatation of the right and left renal collecting systems, and soft tissue stranding surrounds both kidneys.. 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 Correlate  for urinary retention, bladder outflow obstruction. Bilateral small benign renal cysts are present. There is an indeterminate right mid renal mass measuring about 3.0 cm which could represent an atypical cyst or a solid mass. Recommend follow-up renal  ultrasound examination. 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 Chronic left upper pole renal parenchymal scarring.    ABDOMEN FINDINGS:  Small stone within nondistended gallbladder. No bile duct dilatation. Liver, pancreas and spleen are unremarkable. Normal caliber abdominal aorta. Small bowel and colon are normal in caliber and appearance.    PELVIS FINDINGS:  Enlarged prostate with benign-appearing calcifications. Moderately large volume stool distending the rectum. No free pelvic fluid. Severe degenerative disc disease and facet arthropathy at L5-S1.      Impression:      1.  Fluid distended stomach, fluid-filled hiatal hernia and fluid dilated thoracic esophagus present on the earlier study today, now partially decompressed.  2.  New mild airspace infiltrates in the posterior infrahilar lower lobes. Given the history, early aspiration pneumonia is a consideration.  3.  Suspected bladder outflow obstruction with markedly distended urinary bladder (930 cc), mild bilateral renal collecting system dilatation and perinephric soft tissue stranding. Enlarged prostate with possible TURP defect centrally.  4.  Stool distended rectum.  5.  Cholelithiasis.    Signer Name: Dain Kirk MD   Signed: 7/5/2021 10:36 PM   Workstation Name: [email or web address]     [facility] have reviewed the medications:  Scheduled Meds:bisacodyl, 10 mg, Oral, Daily  docusate sodium, 100 mg, Oral, BID  [START ON 7/7/2021] enoxaparin, 30 mg, Subcutaneous, Daily  fluticasone, 1 spray, Each Nare, Daily  insulin lispro, 0-7 Units, Subcutaneous, TID AC  ipratropium-albuterol, 3 mL, Nebulization, 4x Daily - RT  lacosamide, 50 mg, Intravenous, Q12H  latanoprost, 1 drop, Both Eyes, Nightly  levETIRAcetam, 500 mg, Intravenous, Q12H  methylnaltrexone, 4 mg, Subcutaneous, Every Other Day  metoclopramide, 10 mg, Intravenous, Q6H  piperacillin-tazobactam, 3.375 g, Intravenous, Q8H  sodium chloride, 10 mL, Intravenous, Q12H  sodium chloride, 10 mL, Intravenous, Q12H  timolol, 1 drop, Both Eyes, Q12H      Continuous Infusions:lactated ringers, 100 mL/hr, Last Rate: 100 mL/hr (07/05/21 2315)  niCARdipine, 5-15 mg/hr, Last Rate: Stopped (07/05/21 2007)      PRN Meds:.•  acetaminophen **OR** acetaminophen **OR** acetaminophen  •  dextrose  •  dextrose  •  glucagon (human recombinant)  •  glycerin adult  •  ipratropium-albuterol  •  magnesium sulfate **OR** magnesium sulfate in D5W 1g/100mL (PREMIX) **OR** magnesium sulfate  •  ondansetron  •  polyvinyl alcohol  •  sodium chloride  •  sodium chloride  •  sodium chloride    Assessment/Plan   Assessment & Plan     Active Hospital Problems    Diagnosis  POA   • **Suspected cerebrovascular accident (CVA) [R09.89]  Yes   • Hypertensive urgency [I16.0]  Yes   • Acute respiratory failure with hypoxia (CMS/HCC) [J96.01]  Yes   • Altered mental status [R41.82]  Yes   • Hypertension [I10]  Yes   • Hyperlipidemia [E78.5]  Yes   • 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        Brief Hospital Course to date:  Jonatan Ocampo is an 81yoM with h/o left temporal mass, seizures, HTN, HLD who presented to the [facility] ED 7/5 AM as a code stroke, with altered mental  status, right sided weakness, dysarthria and aphasia. Patient also noted to have vomiting and hypoxia in the ED- requiring 2-3 L on no home baseline. Was evaluated by the stroke team in the ED, CT imaging with left temporal lobe mass c/w prior MRI, MRI imaging did not reveal acute CVA. Patient currently admitted for ongoing stroke w/u and w/u of altered mental status.      Altered mental status/right extremity weakness  Seizures- concern for ongoing seizures  -Already loaded with Keppra 1 g, continue with neurology adjusting dose to 500 BID, Vimpat also added   -MRI negative for acute CVA as above  -neurology has requested moving to cEEG floor as concern seizures are ongoing-- I have d/w ACC/bed coordinators and this is in process. I d/w Dr RAM, who after d/w EEG tech felt patient was having frequent seizures but not in status. His EEG waveform looks improved to her on cEEG monitoring following medication titration as above. Will continue to monitor on cEEG for now.  --seizure precautions, ativan in place      Acute hypoxic RF  Elevated D-dimer   Aspiration PNA  -noted to have aspiration event following being cleared for dinner by SLP 7/5 in setting of probable ongoing seizures. CT imaging done overnight in light of worsening respiratory status confirmed aspiration process  --keep NPO, IV zosyn, follow cultures, wean oxygen as able  --CTA also done as D-dimer near 10- no PE noted    Acute renal failure  --normal creatinine on admission- now up to 2.3, was initially given dose of lasix overnight due to concern for volume overload -- however now on NS 100cc/hr. Multifactorial in etiology as patient rec'd significant contrast load with MRI/CTAs, also may have some degree of ATN from infections/etc-- monitor, trend, avoid nephroxotins    Ileus  --noted overnight 7/5. Surgery following, appreciate, with NGT and supportive measures ongoing. 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  Patient follows up with Dr. Hurt. Continue Keppra as above     Hyperlipidemia-not on statin        DVT prophylaxis:  Medical and mechanical DVT prophylaxis orders are present.       Disposition: I expect the patient to be discharged pending improvement/stability. High risk for decompensation and critically ill. D/w family code status and agree he wishes to remain full code at this time    CODE STATUS:   Code Status and Medical Interventions:   Ordered at: 07/05/21 0604     Level Of Support Discussed With:    Patient     Code Status:    CPR     Medical Interventions (Level of Support Prior to Arrest):    Full       Anna Stanford MD  07/06/21

## 2021-07-06 NOTE — PROGRESS NOTES
"Neurology       Patient Care Team:  Floyd Heard MD as PCP - General  Floyd Heard MD as PCP - Family Medicine  Cesar Henning MD as Consulting Physician (Neurology)    Chief complaint altered mental status, seizure      Subjective .     History:   Mr. Jonatan Ocampo is an 81-year-old man who has a history of left temporal low-grade glioma with seizures. Patient has been on Keppra for years but this was being tapered down as he was having side effects. His typical seizures are followed by postictal vomiting, inability to speak and left gaze preference. He was admitted for breakthrough seizures and given additional Keppra. Yesterday he was also given a loading dose of Vimpat. EEG this morning showing multiple and frequent electrographic seizures originating in the left temporal lobe that resolved with medication adjustment.    There is no family at bedside.    Patient is quite altered, he is an NG tube to wall suction. He is unable to follow commands-T-max 100.7. White count trending up.  Objective     Vital Signs   Blood pressure 158/87, pulse 97, temperature 98.7 °F (37.1 °C), temperature source Axillary, resp. rate 22, height 175.3 cm (69\"), weight 89.4 kg (197 lb 3.2 oz), SpO2 93 %.    Physical Exam:  Elderly man, somnolent/lethargic, ill-appearing. With significant stimulation he will briefly open his eyes but does not follow commands. NG tube to wall suction. Severe halitosis. Speech is dysarthric and mumbled. Poor attention, he quickly falls back asleep. He is not in restraints but does not localize to pain. He will  on both hands but does not release on command. He does respond to noxious stimuli in all extremities.    Results Review:  White count 18.79 with a left shift, platelets stable, creatinine 2.34, BUN 33, normal LFTs.     UA yesterday negative for infection.  Brain MRI with and without contrast personally reviewed, there is evidence of low-grade left temporal lobe " glioma, no enhancement but there is a lot of motion artifact        Assessment/Plan   Breakthrough seizures  Low-grade left temporal glioma  Ileus  Fever    Suspect this patient who has a history of low-grade left temporal lobe glioma had breakthrough seizures, possibly in the setting of infection although no source has been identified. We've titrated up on Keppra and scheduled Vimpat. It may take several days for him to return to his baseline given his prolonged seizure activity and sensitivity to AEDs.    plan  -ativan 2mg prn for seizure activity  -increase keppra to 500mg bid  -loaded w/vimpat yesterday, will continue at 50mg bid   -EEG later this afternoon improving, slow      Time spent >35    I discussed the patients findings and my recommendations with bedside nurse, primary team     Jeanette Pope MD  07/06/21  10:02 EDT

## 2021-07-06 NOTE — NURSING NOTE
Daily Low Julio <=14    Julio score: 10    At this time the patient's RN reports no new skin issues or needs.  Interventions in place. Continue to provide good general skin care. Apply barrier cream daily/ PRN. Keep patient dry and turn regularly.  Elevate and offload heels. The patient is on a  waffle    mattress at this time. Will order FLORA.    Please contact [facility] if new skin issues arise.

## 2021-07-06 NOTE — NURSING NOTE
EEG tech Gennaro) at bedside to perform skin integrity check.  No signs of redness, swelling or irritation.  Reapplied paste to frontal electrodes (pt had sweat off).   Secured wrap back with tape.

## 2021-07-06 NOTE — CONSULTS
Patient Name:  Jonatan Ocampo  YOB: 1940  [id number]       Patient Care Team:  Floyd Heard MD as PCP - General  Floyd Heard MD as PCP - Family Medicine  Cesar Henning MD as Consulting Physician (Neurology)      General Surgery Consult Note     Date of Consultation: 07/06/21    Consulting Physician : Anna Stanford MD    Reason for Consult : Nausea and vomiting    Subjective     I have been asked to see  Jonatan Ocampo , a 81 y.o. male in consultation for nausea and vomiting. He was admitted to our hospital on 7/5/2021 with a likely seizure.  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  By family report, his typical aftereffects of seizure or vomiting inability to speak and gaze preference.  He was evaluated by the stroke team in the emergency department, and CT scanning was consistent with a known left temporal lobe mass consistent with his prior imaging.  There was no evidence of acute stroke.  After being admitted to the hospital he had another episode of vomiting and witnessed aspiration.  He was noted to have some abdominal distention as well.  An NG tube was successfully placed.  He then underwent CT scanning of the abdomen which demonstrated some distention of the stomach and a questionable ileus.  Since that time he has been nonverbal.  We have been asked to see him for possible surgical management.  Due to his inability to speak, all history is obtained from the hospital chart.      Allergy:   Allergies   Allergen Reactions   • Sulfa Antibiotics Hives     RASH       Medications:  bisacodyl, 10 mg, Oral, Daily  docusate sodium, 100 mg, Oral, BID  enoxaparin, 40 mg, Subcutaneous, Daily  fluticasone, 1 spray, Each Nare, Daily  insulin lispro, 0-7 Units, Subcutaneous, TID AC  ipratropium-albuterol, 3 mL, Nebulization, 4x Daily - RT  latanoprost, 1 drop, Both Eyes, Nightly  levETIRAcetam,  "250 mg, Intravenous, Q12H  methylnaltrexone, 4 mg, Subcutaneous, Every Other Day  metoclopramide, 10 mg, Intravenous, Q6H  piperacillin-tazobactam, 3.375 g, Intravenous, Q8H  sodium chloride, 10 mL, Intravenous, Q12H  sodium chloride, 10 mL, Intravenous, Q12H  timolol, 1 drop, Both Eyes, Q12H      lactated ringers, 100 mL/hr, Last Rate: 100 mL/hr (07/05/21 2315)  niCARdipine, 5-15 mg/hr, Last Rate: Stopped (07/05/21 2007)      No current facility-administered medications on file prior to encounter.     Current Outpatient Medications on File Prior to Encounter   Medication Sig   • timolol (TIMOPTIC) 0.25 % ophthalmic solution 1 drop 2 (Two) Times a Day.   • fluticasone (FLONASE) 50 MCG/ACT nasal spray into each nostril.   • latanoprost (XALATAN) 0.005 % ophthalmic solution    • levETIRAcetam (KEPPRA) 500 MG tablet Take 0.5 tablets by mouth Every 12 (Twelve) Hours.   • lisinopril (PRINIVIL,ZESTRIL) 20 MG tablet Take  by mouth.   • Polyethyl Glycol-Propyl Glycol (SYSTANE OP) Systane (propylene glycol)       PMHx:   Past Medical History:   Diagnosis Date   • Abnormal MRI of head    • Generalized convulsive seizure (CMS/HCC)    • Glaucoma    • Hyperlipidemia    • Hypertension    • Seizures (CMS/HCC)        Past Surgical History:  Past Surgical History:   Procedure Laterality Date   • NO PAST SURGERIES           Family History: Noncontributory     Social History:    Tobacco use: Denies     EtOH use : Denies    Illicit drug use: Denies      Review of Systems   Review of systems could not be obtained due to   patient nonverbal.           Objective     Physical Exam:      Vital Signs  /88 (BP Location: Left arm, Patient Position: Lying)   Pulse 98   Temp 100.1 °F (37.8 °C) (Axillary)   Resp 26   Ht 175.3 cm (69\")   Wt 89.4 kg (197 lb 3.2 oz)   SpO2 92%   BMI 29.12 kg/m²     Intake/Output Summary (Last 24 hours) at 7/6/2021 0704  Last data filed at 7/6/2021 0600  Gross per 24 hour   Intake --   Output 2440 ml "   Net -2440 ml         Physical Exam:    Head: Normocephalic, atraumatic.   Eyes: Pupils equal, round, react to light and accommodation.  He has a preferential right-sided gaze  Mouth: Oral mucosa without lesions,   Neck: No masses, lymphadenopathy or carotid bruits bilaterally   CV: Rhythm  and rate regular , no  murmurs, rubs or gallops  Lungs: rhonchi with some crackles to auscultation bilaterally   Abdomen: Bowel sounds positive but hypoactive , soft, nontender. No surgical scars  Groin : No obvious hernias bilaterally   Extremities:  No cyanosis, clubbing or edema bilaterally   Lymphatics: No abnormal lymphadenopathy appreciated   Neurologic: He does not regard the examiner, he has a preferential gaze to the patient's right side.  He does not follow commands.      Results Review: I have personally reviewed all of the recent lab and imaging results available at this time.  Imchael exam reveals a pH 7.36 with a PCO2 of 42 and a PO2 of 59.2 base excess is -1.4.  Comprehensive metabolic panel significant for creatinine 2.3, glucose 144, CO2 is 18.  Lactate is 2.3.    CT scan of the abdomen pelvis was personally viewed by me as well as a final dictated and edited report.  This demonstrates a mildly dilated stomach and esophagus.  There is no evidence of bowel obstruction.  There is no free air or free fluid.  There is a massively distended bladder.  There is stool and constipation within the rectum.  There are no abdominal hernias of note.  The gallbladder is patent with some contrast within the gallbladder and the stone.  There are some mild stranding around the kidneys.  No obstructive uropathy.  There appears to be some airspace disease of the right lower lobe.       Assessment/Plan     Assessment and Plan:    Problem List Items Addressed This Visit        Neuro    Altered mental status - Primary      Other Visit Diagnoses     Non-intractable vomiting with nausea, unspecified vomiting type  -I suspect this is  related to his recent seizure, and there is no evidence of bowel obstruction.  I agree with NG tube for now and close observation.  I will start him on a bowel regimen.  He is already had several bowel movements, and I suspect this will resolve without the need for surgical intervention.  I will continue to follow this patient with you.    Cerebrovascular accident (CVA), unspecified mechanism (CMS/HCC)        Hypertensive emergency        History of seizure        Dysphagia, unspecified type        Cognitive communication deficit        Dysarthria               Active Hospital Problems    Diagnosis  POA   • **Suspected cerebrovascular accident (CVA) [R09.89]  Yes   • Hypertensive urgency [I16.0]  Yes   • Acute respiratory failure with hypoxia (CMS/HCC) [J96.01]  Yes   • Altered mental status [R41.82]  Yes   • Hypertension [I10]  Yes   • Hyperlipidemia [E78.5]  Yes   • 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            I discussed the patient's findings and my recommendations with the patient and/or family, as well as the primary team     Tyrell Osborne MD  07/06/21  07:04 EDT

## 2021-07-06 NOTE — SIGNIFICANT NOTE
07/06/21 0908   SLP Deferred Reason   SLP Deferred Reason Unable to evaluate, medical status change  (Attempted to f/u for clinical swallow re-eval. Spoke to RN who reported pt not medically appropriate for eval today. Will f/u as appropriate.)

## 2021-07-07 NOTE — NURSING NOTE
EEG technologist at bedside with RN to check skin integrity. No redness or irritation noted. Video/audio recording.

## 2021-07-07 NOTE — CASE MANAGEMENT/SOCIAL WORK
Continued Stay Note  [facility]     Patient Name: Jonatan Ocampo  MRN: [id number]  Today's Date: 7/7/2021    Admit Date: 7/5/2021    Discharge Plan     Row Name 07/07/21 0942       Plan    Plan  CM update    Patient/Family in Agreement with Plan  yes    Plan Comments  Patient continuous EEG completed this AM, NG to LWS remains. Patient was independent of ADLs, at home with wife, and did not use any DME prior to admission. Will need PT/OT eval once appropriate - CM will continue to follow for discharge planning - luisa [phone]        Discharge Codes    No documentation.             Luisa Mcadams RN

## 2021-07-07 NOTE — PLAN OF CARE
Goal Outcome Evaluation:                 Patient neuro status remains the same, not following commands, still attempting to remove NG and Iv's,  restraints in place, NG continues to have large amount of coffeeground output.  BP has been elevated, PRN labetalol given twice, nicardipine gtt started, large amount of respiratory secretions that are dark brown/red, MD aware, RT and I have been suctioning frequently. Chen catheter still having concentrated output. Family decided to switch code status to DNR.

## 2021-07-07 NOTE — PROGRESS NOTES
"Neurology       Patient Care Team:  Floyd Heard MD as PCP - General  Floyd Heard MD as PCP - Family Medicine  Cesar Henning MD as Consulting Physician (Neurology)    Chief complaint breakthrough seizures, AMS, coffee-ground emesis      Subjective .     History:    EEG still abnormal w/ frequent sharps but no further seizures  Still having copious coffee-ground secretions  Remains confused, now in restraints for pulling out lines and IV access    Wife and son at bedside        Objective     Vital Signs   Blood pressure (!) 191/93, pulse 71, temperature 98.3 °F (36.8 °C), temperature source Axillary, resp. rate 20, height 175.3 cm (69.02\"), weight 87 kg (191 lb 11.2 oz), SpO2 93 %.    Physical Exam:  Ill-appearing adult man.  He has NG tube to wall suction with copious coffee-ground secretions.  He has lot of secretions in the back of his throat making it difficult for him to speak.  He is confused but will become briefly alert.  He is unable to follow any commands.  He withdraws to pain in all extremities.  He is restrained at the wrist.    Results Review:  A.m. labs reviewed  EEG  Multiple electrographic seizures, origin left posterior temporal, with clinical correlate of versive head/eye movements to the right, responsive to anticonvulsive agents     Frequent left posterior temporal spike/slow wave discharges     Underlying background is moderate generalized slow, greater over the left  Assessment/Plan     Breakthrough seizures  Low-grade left temporal glioma  Ileus  Fever     7/6  Suspect this patient who has a history of low-grade left temporal lobe glioma had breakthrough seizures, possibly in the setting of infection although no source has been identified. We've titrated up on Keppra and scheduled Vimpat. It may take several days for him to return to his baseline given his prolonged seizure activity and sensitivity to AEDs.     7/7    No further seizures on EEG.  He responded " well to increase in medication in addition to Vimpat.    However exam has not improved and had a long conversation with son and wife at bedside.    Explained that it is going to take considerable time to see improvement, however cautioned that he may not return to baseline.  The concern when someone is this encephalopathic is that they will continue to become worse, aspirates and develop infections.  He also is at risk for developing hospital-acquired delirium which will dramatically hinder his ability to recover.  Another concern is that he did not tolerate Keppra because it made him too somnolent and we had to go up on this medication to prevent seizure.    We discussed that family should consider making patient DNR.  If he were to have a cardiac arrest, this would most likely confirm a poor prognosis and would only prolong suffering.  Family is still trying to decide.    We will repeat EEG tomorrow.    Continue full supportive care.  plan  -ativan 2mg prn for seizure activity  -keppra 500mg bid  -vimpat 50mg bid  -d/c continuous EEG  -continue goals of care discussions    I discussed the patients findings and my recommendations with patient's family, primary team   Jeanette Pope MD  07/07/21  07:29 EDT

## 2021-07-07 NOTE — PROGRESS NOTES
"Patient Name:  Jonatan Ocampo  YOB: 1940  [id number]    Surgery Progress Note    Date of visit: 7/7/2021    Subjective   Subjective: Remains aphasic.  Multiple bowel movements, NG tube in place.         Objective     Objective:     BP (!) 191/93   Pulse 71   Temp 98.3 °F (36.8 °C) (Axillary)   Resp 20   Ht 175.3 cm (69.02\")   Wt 87 kg (191 lb 11.2 oz)   SpO2 93%   BMI 28.30 kg/m²     Intake/Output Summary (Last 24 hours) at 7/7/2021 0730  Last data filed at 7/7/2021 0438  Gross per 24 hour   Intake 1098 ml   Output 1675 ml   Net -577 ml       CV:  Rhythm  regular and rate regular   L:  Mild rhonchi to auscultation bilaterally   Abd:  Bowel sounds positive , soft, nontender.  Ext:  No cyanosis, clubbing, edema    Recent labs that are back at this time have been reviewed.        Assessment/Plan     Assessment/ Plan:    Problem List Items Addressed This Visit        Neuro    Altered mental status - Primary      Other Visit Diagnoses     Non-intractable vomiting with nausea, unspecified vomiting type    - I suspect this was related to his seizure, and his bowel function apparently is returned.  Continue with bowel regimen.  I would strongly consider discussing overall goals of care with his family, given his debilitated state.  There is no indication for surgical intervention.    Cerebrovascular accident (CVA), unspecified mechanism (CMS/HCC)        Hypertensive emergency        History of seizure        Dysphagia, unspecified type        Cognitive communication deficit        Dysarthria               Active Hospital Problems    Diagnosis  POA   • **Suspected cerebrovascular accident (CVA) [R09.89]  Yes   • Hypertensive urgency [I16.0]  Yes   • Acute respiratory failure with hypoxia (CMS/HCC) [J96.01]  Yes   • Altered mental status [R41.82]  Yes   • Hypertension [I10]  Yes   • Hyperlipidemia [E78.5]  Yes   • Generalized convulsive seizures (CMS/HCC) [R56.9]  Yes      Resolved Hospital Problems "   No resolved problems to display.              Tyrell Osborne MD  7/7/2021  07:30 EDT

## 2021-07-07 NOTE — PLAN OF CARE
Problem: Adult Inpatient Plan of Care  Goal: Plan of Care Review  Outcome: Ongoing, Progressing  Flowsheets (Taken 7/7/2021 6087)  Progress: no change  Plan of Care Reviewed With: patient  Outcome Summary: Patient remains unable to follow commands this shift, but has tried to speak a few times. The words are garbled and unintelligible. Oral care performed on patient this shift. Attempted to suction secretions from back of throat but patient clenched his teeth tightly and was biting at suction catheter, as well as the yankauer. Attempted to NT suction patient as well but no secretions retrieved. Patient continues to have upper airway gurgling. 1x dose IV labetalol given this shift for SBP >180. Pt transferred to FLORA bed this shift as well. Pt's NG continues to drain dark brown/coffee ground drainage. 500mL output this shift. UOP 600mL dark simin urine this shift. Continue current POC.

## 2021-07-07 NOTE — NURSING NOTE
Daily Low Julio <=14    Julio score:10    At this time the patient's RN reports no new skin issues or needs.  Interventions in place. Continue to provide good general skin care. Apply barrier cream daily/ PRN. Keep patient dry and turn regularly.  Elevate and offload heels. The patient is on an FLORA mattress at this time.     Please contact WOC if new skin issues arise.

## 2021-07-07 NOTE — SIGNIFICANT NOTE
07/07/21 0833   SLP Deferred Reason   SLP Deferred Reason   (Pt continues to be inappropriate for dysphagia evaluation. RN reported minimal interaction and difficulty managing secretions. Will defer and f/u as appropriate.)

## 2021-07-07 NOTE — PROGRESS NOTES
Clinical Nutrition       Reason for Visit:   Identified at risk by screening criteria      Patient Name: Jonatan Ocampo  YOB: 1940  MRN: [id number]  Date of Encounter: 07/07/21 13:43 EDT  Admission date: 7/5/2021    Nutrition Assessment   Assessment   Seizures  L. temporal glioma  AMS  Vomiting  Ileus  Aspiration PNA  ARF/hypoxia  GI    PMH: He  has a past medical history of Abnormal MRI of head, Generalized convulsive seizure (CMS/HCC), Glaucoma, Hyperlipidemia, Hypertension, and Seizures (CMS/HCC).GLioma   PSxH: He  has a past surgical history that includes No past surgeries.       Reported/Observed/Food/Nutrition Related History:     Pt resting in bed, on nasal cannula, sounds gurgly, has ngt to LWS, is not responsive    Per RN: pt does not follow commands, had 500ml out of ngt this am, dark coffee grounds, + small bm      Anthropometrics     Height: 69in  Last filed wt: 191lb bedscale  BMI: 28  Overweight: 25.0-29.9kg/m2             Last 15 Recorded Weights  View Complete Flowsheet  Weight Weight (kg) Weight (lbs) Weight Method VISIT REPORT   7/7/2021 86.955 kg 191 lb 11.2 oz Bed scale -   7/6/2021 89.359 kg 197 lb - -   7/6/2021 89.449 kg 197 lb 3.2 oz Bed scale -   7/5/2021 87.091 kg 192 lb Stated -   10/1/2020 90.719 kg 200 lb - Report   11/6/2018 88.451 kg 195 lb - Report   11/14/2017 88.451 kg 195 lb - Report   6/1/2017 90.719 kg 200 lb - Report   12/1/2016 90.719 kg 200 lb - Report   6/10/2016 93.895 kg 207 lb - Report   5/31/2016 90.719 kg 200 lb - Report   3/8/2016 90.72 kg 200 lb - -         Labs reviewed     Results from last 7 days   Lab Units 07/07/21  0909 07/06/21  0529 07/05/21  2149 07/05/21  0431   GLUCOSE mg/dL 160* 144* 176* 178*   BUN mg/dL 50* 33* 28* 12   CREATININE mg/dL 1.95* 2.34* 2.29* 0.97   SODIUM mmol/L 139 140 139 140   CHLORIDE mmol/L 101 105 103 106   POTASSIUM mmol/L 4.0 4.2 4.6 3.9   MAGNESIUM mg/dL  --   --  2.0  --    ALT (SGPT) U/L 18 19  --   14     Results from last 7 days   Lab Units 07/07/21  0909 07/06/21  0529 07/05/21  0431   ALBUMIN g/dL 3.00* 3.40* 3.90       Results from last 7 days   Lab Units 07/07/21  1111 07/07/21  0737 07/06/21  2045 07/06/21  1144 07/06/21  0732 07/05/21  2021   GLUCOSE mg/dL 138* 139* 138* 158* 137* 184*     Lab Results   Lab Value Date/Time    HGBA1C 5.3 02/25/2016 0510         Medications reviewed   Pertinent:abx, dulcolax, colace, relistor, reglan, heparin, vimpat, keppra, LR@100ml/hr      Intake/Ouptut 24 hrs (7:00AM - 6:59 AM)     Intake & Output (last day)       07/06 0701 - 07/07 0700 07/07 0701 - 07/08 0700    I.V. (mL/kg) 998 (11.5)     IV Piggyback 100     Total Intake(mL/kg) 1098 (12.6)     Urine (mL/kg/hr) 825 (0.4) 100 (0.2)    Emesis/NG output 850 500    Stool 0     Total Output 1675 600    Net -577 -600          Stool Unmeasured Occurrence 2 x           Nutrition Focused Physical Exam Findings      GI: ngt= 850ml past 24 hours, + 2 bm's    SKIN:bruised      Needs Assessment 7-7-21       Height used 69in   Weight used 191lb         Estimated need Method/Equation used Result    Energy/Calorie need  kcals/d 20kcal per kg 1736kcal    MSJ x 1.2 1871kcal     goal ~1700kcal   Protein g/day .8-1.2g protein per kg 69-104g protein     goal ~70g with current renal function                           Current Nutrition Prescription     PO: NPO Diet  No active supplement orders      Nutrition Diagnosis     7-7-21  Problem Altered GI function   Etiology Per Clinical Status: suspected ileus s/p seizure   Signs/Symptoms vomiting, high ngt output     Problem Inadequate energy intake   Etiology Per Clinical Status   Signs/Symptoms NPO       Nutrition Intervention   1.  Follow treatment progress    Pt NPO 3 days    Ileus appears to be gradually resolving, however pt not alert to participate in SLP evaluation    Consider trophic feed once able to use gut, depending on GOC    TF recs if needed: Peptamen 1.5 start at 20ml/hr,  free water @10ml/hr    Advance gradually as tolerated to goal rate @50ml/hr     At Target Goal Volume     % Est needs   Volume  1100ml     Energy/kcals 1650kcals 97%   Protein 75g pro 107%   Fiber 0g         Fluid via EN 847mL    Total Fluid  1067    Meets 100% RDI yes        Goal:   General: Nutrition support treatment    Establish GOC    Monitoring/Evaluation:   Per protocol, I&O, Pertinent labs, Weight, Skin status, GI status, Symptoms, Swallow function    Yanely Mancilla RD, CNSC  Time Spent: 60min

## 2021-07-07 NOTE — PROGRESS NOTES
[facility] Medicine Services  PROGRESS NOTE    Patient Name: Jonatan Ocampo  : 1940  MRN: [id number]    Date of Admission: 2021  Primary Care Physician: Floyd Heard MD    Subjective   Subjective     CC:  Seizures/AMS    HPI:  Pt remains minimally interactive.  NGT with dark output present.     ROS:  uto    Objective   Objective     Vital Signs:   Temp:  [96.8 °F (36 °C)-98.7 °F (37.1 °C)] 98.3 °F (36.8 °C)  Heart Rate:  [] 95  Resp:  [18-22] 20  BP: (119-185)/() 150/85  Total (NIH Stroke Scale): 9     Physical Exam:  GEN- resting in bed, nonverbal, appears ill  HEENT- atraumatic, normocephalic, eomi, NGT in place with bilious output present  NECK- supple, trachea midline, no masses  RESP: on 5LNC, rhonchi b/l  CV: no murmurs, s1/s2, rrr, hypertensive  MSK: no edema noted, spontaneous movement of all extremities  NEURO: unable to understand speech and mostly nonverbal, does not follow commands, attempts to open his eyes briefly  SKIN: no rashes  PSYCH: uto      Results Reviewed:  Results from last 7 days   Lab Units 21   WBC 10*3/mm3 18.79* 16.62* 8.13   HEMOGLOBIN g/dL 17.4 17.2 14.0   HEMATOCRIT % 54.3* 49.8 41.1   PLATELETS 10*3/mm3 225 237 223   PROCALCITONIN ng/mL  --  2.65* 0.08     Results from last 7 days   Lab Units 21  043   SODIUM mmol/L 140 139 140   POTASSIUM mmol/L 4.2 4.6 3.9   CHLORIDE mmol/L 105 103 106   CO2 mmol/L 18.0* 22.0 22.0   BUN mg/dL 33* 28* 12   CREATININE mg/dL 2.34* 2.29* 0.97   GLUCOSE mg/dL 144* 176* 178*   CALCIUM mg/dL 8.9 8.7 8.7   ALT (SGPT) U/L 19  --  14   AST (SGOT) U/L 38  --  22   TROPONIN T ng/mL  --   --  <0.010   PROBNP pg/mL  --  8,004.0* 360.1     Estimated Creatinine Clearance: 26.9 mL/min (A) (by C-G formula based on SCr of 2.34 mg/dL (H)).    Microbiology Results Abnormal     Procedure Component Value - Date/Time    Blood  Culture - Blood, Hand, Right [[id number]] Collected: 07/05/21 2159    Lab Status: Preliminary result Specimen: Blood from Hand, Right Updated: 07/06/21 2315     Blood Culture No growth at 24 hours    Narrative:      Aerobic bottle only      Blood Culture - Blood, Hand, Right [[id number]] Collected: 07/05/21 2241    Lab Status: Preliminary result Specimen: Blood from Hand, Right Updated: 07/06/21 2315     Blood Culture No growth at 24 hours    Narrative:      Aerobic bottle only      COVID PRE-OP / PRE-PROCEDURE SCREENING ORDER (NO ISOLATION) - Swab, Nasopharynx [[id number]]  (Normal) Collected: 07/05/21 1139    Lab Status: Final result Specimen: Swab from Nasopharynx Updated: 07/05/21 1254    Narrative:      The following orders were created for panel order COVID PRE-OP / PRE-PROCEDURE SCREENING ORDER (NO ISOLATION) - Swab, Nasopharynx.  Procedure                               Abnormality         Status                     ---------                               -----------         ------                     COVID-19,CEPHEID,BRIANNA IN-...[[id number]]  Normal              Final result                 Please view results for these tests on the individual orders.    COVID-19,CEPHEID,BRIANNA IN-HOUSE(OR EMERGENT/ADD-ON),NP SWAB IN TRANSPORT MEDIA 3-4 HR TAT - Swab, Nasopharynx [[id number]]  (Normal) Collected: 07/05/21 1139    Lab Status: Final result Specimen: Swab from Nasopharynx Updated: 07/05/21 1254     COVID19 Not Detected    Narrative:      Fact sheet for providers: https://www.fda.gov/media/033546/download     Fact sheet for patients: https://www.fda.gov/media/061588/download  Fact sheet for providers: https://www.fda.gov/media/073838/download     Fact sheet for patients: https://www.fda.gov/media/434166/download          Imaging Results (Last 24 Hours)     Procedure Component Value Units Date/Time    XR Abdomen KUB [[id number]] Collected: 07/06/21 1431     Updated: 07/06/21 1451    Narrative:      EXAMINATION: XR  ABDOMEN/KUB-07/06/2021:      INDICATION: Ileus; R41.82-Altered mental status, unspecified;  R11.2-Nausea with vomiting, unspecified; I63.9-Cerebral infarction,  unspecified; I16.1-Hypertensive emergency; Z87.898-Personal history of  other specified conditions; R13.10-Dysphagia, unspecified;  R41.841-Cognitive communication deficit; R47.1-Dysarthria and anarthria.        COMPARISON: 07/06/2021.     FINDINGS: Nasogastric tube in place. Nonspecific, nonobstructive bowel  gas pattern with moderate colonic stool burden extending to the  rectosigmoid colon. Chen catheter in situ. Degenerative changes of the  scoliotic spine and pelvis.       Impression:      Nonspecific, nonobstructive bowel gas pattern with decreased  small bowel distention from prior comparison, however, persistent  colonic stool burden concerning for constipation components without  obstructive pattern.     D:  07/06/2021  E:  07/06/2021                   Results for orders placed during the hospital encounter of 07/05/21    Adult Transthoracic Echo Complete W/ Cont if Necessary Per Protocol    Interpretation Summary  · Estimated left ventricular EF = 65%  · The cardiac valves are anatomically and functionally normal.      I have reviewed the medications:  Scheduled Meds:bisacodyl, 10 mg, Oral, Daily  docusate sodium, 100 mg, Oral, BID  fluticasone, 1 spray, Each Nare, Daily  heparin (porcine), 5,000 Units, Subcutaneous, Q8H  insulin lispro, 0-7 Units, Subcutaneous, TID AC  ipratropium-albuterol, 3 mL, Nebulization, 4x Daily - RT  lacosamide, 50 mg, Intravenous, Q12H  latanoprost, 1 drop, Both Eyes, Nightly  levETIRAcetam, 500 mg, Intravenous, Q12H  methylnaltrexone, 4 mg, Subcutaneous, Every Other Day  metoclopramide, 10 mg, Intravenous, Q6H  piperacillin-tazobactam, 3.375 g, Intravenous, Q8H  sodium chloride, 10 mL, Intravenous, Q12H  sodium chloride, 10 mL, Intravenous, Q12H  timolol, 1 drop, Both Eyes, Q12H      Continuous Infusions:lactated  ringers, 100 mL/hr, Last Rate: 100 mL/hr (07/06/21 2348)  niCARdipine, 5-15 mg/hr, Last Rate: Stopped (07/05/21 2007)      PRN Meds:.•  acetaminophen **OR** acetaminophen **OR** acetaminophen  •  dextrose  •  dextrose  •  glucagon (human recombinant)  •  glycerin adult  •  ipratropium-albuterol  •  labetalol  •  magnesium sulfate **OR** magnesium sulfate in D5W 1g/100mL (PREMIX) **OR** magnesium sulfate  •  ondansetron  •  polyvinyl alcohol  •  sodium chloride  •  sodium chloride  •  sodium chloride    Assessment/Plan   Assessment & Plan     Active Hospital Problems    Diagnosis  POA   • **Suspected cerebrovascular accident (CVA) [R09.89]  Yes   • Hypertensive urgency [I16.0]  Yes   • Acute respiratory failure with hypoxia (CMS/HCC) [J96.01]  Yes   • Altered mental status [R41.82]  Yes   • Hypertension [I10]  Yes   • Hyperlipidemia [E78.5]  Yes   • 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        Brief Hospital Course to date:  Jonatan Ocampo is an 81yoM with h/o left temporal mass, seizures, HTN, HLD who presented to the [facility] ED 7/5 AM as a code stroke with altered mental status, right sided weakness, dysarthria and aphasia. Patient also noted to have vomiting and hypoxia in the ED- requiring 2-3 L on no home supplemental O2 baseline. Was evaluated by the stroke team in the ED, CT imaging with left temporal lobe mass c/w prior MRI, MRI imaging did not reveal acute CVA. Patient currently admitted for ongoing stroke w/u and w/u of altered mental status.     This patient's problems and plans were partially entered by my partner and updated as appropriate by me 07/07/21.    Plan:     Altered mental status/right extremity weakness  Seizures- concern for ongoing seizures  -Already loaded with Keppra 1 g, continue with neurology adjusting dose to 500 BID, Vimpat also added   --Neurology following- concern for ongoing seizures but not status per  discussion with Dr. RAM yesterday  -MRI negative for acute CVA as above  -cEEG overnight, now off cEEF  --seizure precautions, ativan in place      Acute hypoxic respiratory failure  Elevated D-dimer   Aspiration PNA  -noted to have aspiration event following being cleared for dinner by SLP 7/5 in setting of probable ongoing seizures. CT imaging done 7/5 pm in light of worsening respiratory status confirmed aspiration process  --keep NPO, IV zosyn, follow cultures, wean oxygen as able  --CTA also done as D-dimer near 10- no PE noted    Acute renal failure  --normal creatinine on admission- then up to 2.3, was initially given dose of lasix overnight 7/5 due to concern for volume overload -- however now on NS 100cc/hr. Multifactorial in etiology as patient rec'd significant contrast load with MRI/CTAs, also may have some degree of ATN from infections/etc-- monitor, trend, avoid nephroxotins  -- improved today    Ileus  --noted overnight 7/5. Surgery following, appreciate, with NGT and supportive measures ongoing. Bowel regimen. 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  Patient follows up with Dr. Hurt. Continue Keppra as above     Hyperlipidemia-not on statin        DVT prophylaxis:  Medical and mechanical DVT prophylaxis orders are present.       Disposition: I expect the patient to be discharged  TBD.     More than 50% of time spent counseling on current illness and plan of care. Case discussed with: pt's family, Neurology and General Surgery  Total time spent face to face with the patient was 17 minutes.  Total time of the encounter was 35 minutes.    Discussed with patient's wife and son at bedside at length this am. They are  considering changing his code status, but are not yet willing to do so.  I did discuss with them that, if he fails to improve in 24 hours, we may need to look at comfort measures at that point- wife seemed to understand and be agreeable to this but is hopeful he will recover.     CODE STATUS:   Code Status and Medical Interventions:   Ordered at: 07/05/21 0604     Level Of Support Discussed With:    Patient     Code Status:    CPR     Medical Interventions (Level of Support Prior to Arrest):    Full       Justina Lujan MD  07/07/21

## 2021-07-08 NOTE — PLAN OF CARE
Pt vss. Cardene drip infusing at 5 mg/hr. NSR on tele. 4 L NC. LR infusing at 100 ml/hr. Pt still in restraints due to pulling tubes/lines. F/C in place. Dark brown respiratory secretions. Suctioning performed. Pt not following commands.

## 2021-07-08 NOTE — SIGNIFICANT NOTE
07/08/21 1503   SLP Deferred Reason   SLP Deferred Reason   (Pt continues to be inappropriate for dysphagia evaluation per RN. SLP to f/u as pt becomes appropriate.)

## 2021-07-08 NOTE — CASE MANAGEMENT/SOCIAL WORK
Continued Stay Note  [facility]     Patient Name: Jonatan Ocampo  MRN: [id number]  Today's Date: 7/8/2021    Admit Date: 7/5/2021    Discharge Plan     Row Name 07/08/21 0940       Plan    Plan  CM update    Patient/Family in Agreement with Plan  yes    Plan Comments  Patient is not ready for discharge at this time- New Palliative care consult added and patient now requiring 6L of 02. Discussion regarding goals of care to follow - CM will continue to follow for discharge planning - luisa [phone]    Final Discharge Disposition Code  30 - still a patient        Discharge Codes    No documentation.             Luisa Mcadams RN

## 2021-07-08 NOTE — PLAN OF CARE
Goal Outcome Evaluation:   Patient unresponsive this shift, had labored breathing throughout shift, respirations as high as 38 at times. Prn morphine given for the increase in work breathing. Ativan given for agitation. Skin interventions in place. Wife and son at bedside much of shift.

## 2021-07-08 NOTE — PROGRESS NOTES
"Neurology       Patient Care Team:  Floyd Heard MD as PCP - General  Floyd Heard MD as PCP - Family Medicine  Cesar Henning MD as Consulting Physician (Neurology)    Chief complaint seizures      Subjective .     History:     No improvement in mental status, in fact he is worse, not trying to interact with me at all today  Brief EEG today showing still having frequent left posterior temporal sharps but no seizures    Wife is at bedside, family has made him DNR/DNI  Patient has had bowel movements,    AFebrile  Objective     Vital Signs   Blood pressure 149/75, pulse 106, temperature 98.8 °F (37.1 °C), temperature source Axillary, resp. rate (!) 38, height 175.3 cm (69.02\"), weight 89.1 kg (196 lb 6.4 oz), SpO2 92 %.    Physical Exam:  Elderly man, restrained at the wrist, obtunded.  He is tachypneic with labored breathing.  However much less secretions today.  He has minimal response to pain in all extremities, he will not open his eyes today or attempt to speak.  He is on 6 L nasal cannula.    Results Review:  A.m. labs reviewed  EEG  Extremely frequent left posterior temporal sharp/slow wave discharges  Lateralized periodic discharges, slightly slower than 1 Hz, left posterior temporal  Discrete electrographic seizures or not seen  Underlying background is moderate generalized slow, greater on the left  Assessment/Plan     7/6  Suspect this patient who has a history of low-grade left temporal lobe glioma had breakthrough seizures, possibly in the setting of infection although no source has been identified. We've titrated up on Keppra and scheduled Vimpat. It may take several days for him to return to his baseline given his prolonged seizure activity and sensitivity to AEDs.     7/7     No further seizures on EEG.  He responded well to increase in medication in addition to Vimpat.     However exam has not improved and had a long conversation with son and wife at " bedside.     Explained that it is going to take considerable time to see improvement, however cautioned that he may not return to baseline.  The concern when someone is this encephalopathic is that they will continue to become worse, aspirates and develop infections.  He also is at risk for developing hospital-acquired delirium which will dramatically hinder his ability to recover.  Another concern is that he did not tolerate Keppra because it made him too somnolent and we had to go up on this medication to prevent seizure.     We discussed that family should consider making patient DNR.  If he were to have a cardiac arrest, this would most likely confirm a poor prognosis and would only prolong suffering.  Family is still trying to decide.     We will repeat EEG tomorrow.    7/8     Repeat EEG showing no further seizure however still significant dysfunction on the left side of his brain where he has a known low-grade glioma.  Mental status is worsening.      Long conversation with his wife at bedside today.  She tells me that she is ready to let him go and does not want to see him to continue to suffer, Berrios her son is struggling with transitioning to comfort care.    We discussed that every day the patient does not awake.  Prognosis for a full and quick recovery is reduced.  Reiterated that he would have a prolonged hospitalization and rehab without guarantee of returning to his former baseline.  Patient was very independent and wife tells me he would not want to be in a hospital for a long period of time.    Continue goals of care discussion with family and son.    plan  -ativan 2mg prn for seizure activity  -keppra 500mg bid  -vimpat 50mg bid  -brief eeg again tomorrow  -continue goals of care discussions    I discussed the patients findings and my recommendations with patient's wife, primary team, palliative care    Time spent>35    Jeanette Pope MD  07/08/21  08:25 EDT

## 2021-07-08 NOTE — PROGRESS NOTES
[facility] Services  PROGRESS NOTE    Patient Name: Jonatan Ocampo  : 1940  MRN: [id number]    Date of Admission: 2021  Primary Care Physician: Floyd Heard MD    Subjective   Subjective     CC:  Seizures/AMS    HPI:  Pt's family decided to make him DNR/DNI last pm.  Pt continues to be unresponsive.  Had thick secretions with NT suction last pm- d/w respiratory last pm and RT felt that pt needed a bronch. I discussed with wife this am that I did not feel that would be warranted - especially now that pt's respiratory status is actually a bit better.     ROS:  uto    Objective   Objective     Vital Signs:   Temp:  [98 °F (36.7 °C)-99.4 °F (37.4 °C)] 98.8 °F (37.1 °C)  Heart Rate:  [] 106  Resp:  [19-38] 38  BP: (118-230)/() 149/75  Total (NIH Stroke Scale): 9     Physical Exam:  GEN- resting in bed, nonverbal, appears ill  HEENT- atraumatic, normocephalic, eomi, NGT in place with bilious output present  NECK- supple, trachea midline, no masses  RESP: on 6LNC, but breathing out of his mouth (so on RA), rhonchi b/l  CV: no murmurs, s1/s2, rrr  MSK: no edema noted, spontaneous movement of all extremities  NEURO: unable to understand speech and mostly nonverbal, does not follow commands, does not attempt to open eyes  SKIN: no rashes  PSYCH: uto      Results Reviewed:  Results from last 7 days   Lab Units 21  0656 21  0909 21  0529 21  2149 21  0431   WBC 10*3/mm3 14.88* 15.04* 18.79* 16.62* 8.13   HEMOGLOBIN g/dL 15.0 15.8 17.4 17.2 14.0   HEMATOCRIT % 46.7 49.0 54.3* 49.8 41.1   PLATELETS 10*3/mm3 176 179 225 237 223   PROCALCITONIN ng/mL  --   --   --  2.65* 0.08     Results from last 7 days   Lab Units 21  0909 21  0529 21  2149 21  0431   SODIUM mmol/L 139 140 139 140   POTASSIUM mmol/L 4.0 4.2 4.6 3.9   CHLORIDE mmol/L 101 105 103 106   CO2 mmol/L 24.0 18.0* 22.0 22.0   BUN mg/dL 50* 33* 28*  12   CREATININE mg/dL 1.95* 2.34* 2.29* 0.97   GLUCOSE mg/dL 160* 144* 176* 178*   CALCIUM mg/dL 9.0 8.9 8.7 8.7   ALT (SGPT) U/L 18 19  --  14   AST (SGOT) U/L 52* 38  --  22   TROPONIN T ng/mL  --   --   --  <0.010   PROBNP pg/mL  --   --  8,004.0* 360.1     Estimated Creatinine Clearance: 32.3 mL/min (A) (by C-G formula based on SCr of 1.95 mg/dL (H)).    Microbiology Results Abnormal     Procedure Component Value - Date/Time    Blood Culture - Blood, Hand, Right [[id number]] Collected: 07/05/21 2159    Lab Status: Preliminary result Specimen: Blood from Hand, Right Updated: 07/07/21 2315     Blood Culture No growth at 2 days    Narrative:      Aerobic bottle only      Blood Culture - Blood, Hand, Right [[id number]] Collected: 07/05/21 2241    Lab Status: Preliminary result Specimen: Blood from Hand, Right Updated: 07/07/21 2315     Blood Culture No growth at 2 days    Narrative:      Aerobic bottle only      COVID PRE-OP / PRE-PROCEDURE SCREENING ORDER (NO ISOLATION) - Swab, Nasopharynx [[id number]]  (Normal) Collected: 07/05/21 1139    Lab Status: Final result Specimen: Swab from Nasopharynx Updated: 07/05/21 1254    Narrative:      The following orders were created for panel order COVID PRE-OP / PRE-PROCEDURE SCREENING ORDER (NO ISOLATION) - Swab, Nasopharynx.  Procedure                               Abnormality         Status                     ---------                               -----------         ------                     COVID-19,CEPHEID,BRIANNA IN-...[[id number]]  Normal              Final result                 Please view results for these tests on the individual orders.    COVID-19,CEPHEID,BRIANNA IN-HOUSE(OR EMERGENT/ADD-ON),NP SWAB IN TRANSPORT MEDIA 3-4 HR TAT - Swab, Nasopharynx [[id number]]  (Normal) Collected: 07/05/21 1139    Lab Status: Final result Specimen: Swab from Nasopharynx Updated: 07/05/21 1254     COVID19 Not Detected    Narrative:      Fact sheet for providers:  https://www.fda.gov/media/629859/download     Fact sheet for patients: https://www.fda.gov/media/326724/download  Fact sheet for providers: https://www.fda.gov/media/365581/download     Fact sheet for patients: https://www.fda.gov/media/460230/download          Imaging Results (Last 24 Hours)     ** No results found for the last 24 hours. **          Results for orders placed during the hospital encounter of 07/05/21    Adult Transthoracic Echo Complete W/ Cont if Necessary Per Protocol    Interpretation Summary  · Estimated left ventricular EF = 65%  · The cardiac valves are anatomically and functionally normal.      I have reviewed the medications:  Scheduled Meds:bisacodyl, 10 mg, Oral, Daily  docusate sodium, 100 mg, Oral, BID  fluticasone, 1 spray, Each Nare, Daily  heparin (porcine), 5,000 Units, Subcutaneous, Q8H  insulin lispro, 0-7 Units, Subcutaneous, TID AC  ipratropium-albuterol, 3 mL, Nebulization, 4x Daily - RT  lacosamide, 50 mg, Intravenous, Q12H  latanoprost, 1 drop, Both Eyes, Nightly  levETIRAcetam, 500 mg, Intravenous, Q12H  methylnaltrexone, 4 mg, Subcutaneous, Every Other Day  metoclopramide, 5 mg, Intravenous, Q6H  piperacillin-tazobactam, 3.375 g, Intravenous, Q8H  sodium chloride, 10 mL, Intravenous, Q12H  sodium chloride, 10 mL, Intravenous, Q12H  timolol, 1 drop, Both Eyes, Q12H      Continuous Infusions:lactated ringers, 100 mL/hr, Last Rate: 100 mL/hr (07/07/21 2148)  niCARdipine, 5-15 mg/hr, Last Rate: 5 mg/hr (07/08/21 0519)      PRN Meds:.•  acetaminophen **OR** acetaminophen **OR** acetaminophen  •  dextrose  •  dextrose  •  glucagon (human recombinant)  •  glycerin adult  •  ipratropium-albuterol  •  labetalol  •  magnesium sulfate **OR** magnesium sulfate in D5W 1g/100mL (PREMIX) **OR** magnesium sulfate  •  ondansetron  •  polyvinyl alcohol  •  sodium chloride  •  sodium chloride  •  sodium chloride    Assessment/Plan   Assessment & Plan     Active Hospital Problems    Diagnosis   POA   • **Suspected cerebrovascular accident (CVA) [R09.89]  Yes   • Hypertensive urgency [I16.0]  Yes   • Acute respiratory failure with hypoxia (CMS/HCC) [J96.01]  Yes   • Altered mental status [R41.82]  Yes   • Hypertension [I10]  Yes   • Hyperlipidemia [E78.5]  Yes   • 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        Brief Hospital Course to date:  Jonatan Ocampo is an 81yoM with h/o left temporal mass, seizures, HTN, HLD who presented to the [facility] ED 7/5 AM as a code stroke with altered mental status, right sided weakness, dysarthria and aphasia. Patient also noted to have vomiting and hypoxia in the ED- requiring 2-3 L on no home supplemental O2 baseline. Was evaluated by the stroke team in the ED, CT imaging with left temporal lobe mass c/w prior MRI, MRI imaging did not reveal acute CVA. Patient currently admitted for ongoing stroke w/u and w/u of altered mental status.     This patient's problems and plans were partially entered by my partner and updated as appropriate by me 07/08/21.    Plan:     Altered mental status/right extremity weakness  Seizures- concern for ongoing seizures  -Already loaded with Keppra 1 g, continue with neurology adjusting dose to 500 BID, Vimpat also added   --Neurology following- concern for ongoing seizures but not status per discussion with Dr. RAM  -MRI negative for acute CVA as above  -cEEG overnight 7/6-7/7, now off cEEG  --seizure precautions     Acute hypoxic respiratory failure  Elevated D-dimer   Aspiration PNA  -noted to have aspiration event following being cleared for dinner by SLP 7/5 in setting of probable ongoing seizures. CT imaging done 7/5 pm in light of worsening respiratory status confirmed aspiration process  --keep NPO, IV zosyn, follow cultures, wean oxygen as able  --CTA also done as D-dimer near 10- no PE noted    Acute renal failure  --normal creatinine on admission- then up to  2.3, was initially given dose of lasix overnight 7/5 due to concern for volume overload -- however now on NS 100cc/hr. Multifactorial in etiology as patient rec'd significant contrast load with MRI/CTAs, also may have some degree of ATN from infections/etc-- monitor, trend, avoid nephroxotins  -- improving    Ileus  --noted overnight 7/5. Surgery following, appreciate, with NGT and supportive measures ongoing. Bowel regimen. 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  Patient follows up with Dr. Hurt. Continue Keppra as above     Hyperlipidemia-not on statin    Hypernatremia  --likely due to dehydration.  Continue IVFs    Poor overall prognosis. Family now in agreement with DNR/DNI status. Not yet willing to pursue comfort measures but I feel they would be amenable to this in the next few days if no improvement.  Will consult Palliative.         DVT prophylaxis:  Medical and mechanical DVT prophylaxis orders are present.       Disposition: I expect the patient to be discharged TBD.         CODE STATUS:   Code Status and Medical Interventions:   Ordered at: 07/07/21 3357     Limited Support to NOT Include:    Cardioversion/Defibrillation    Intubation    Vasopressors    Dialysis     Level Of Support Discussed With:    Next of Kin (If No Surrogate)     Code Status:    No CPR     Medical Interventions (Level of Support Prior to Arrest):    Limited       Justina Lujan MD  07/08/21

## 2021-07-08 NOTE — PLAN OF CARE
New Palliative Care consult for assist with goals of care.  Patient seen on initial consult by CHAMP CLAUDIO and WAN REDDYW.  Patient labored in breathing at time of visits, pt wife and son present - receptive to palliative care support in helping with symptom management and plan of care.  IV morphine 1mg q2 hours prn added for dyspnea.  Palliative Care will continue to follow for support and assist with plan of care/goals of care.    1300 Palliative Care Interdisciplinary Rounds:  CHAMP Dykes APRN; LEELEE CLAUDIO; WAN Soto LCSW, [facility]-; MARTHA Walsh RN, OCN, JOAO; Hospice CM NATY Tompkins RN, CHPN.    Problem: Palliative Care  Goal: Enhanced Quality of Life  Outcome: Ongoing, Progressing  Intervention: Promote Advance Care Planning  Flowsheets (Taken 7/8/2021 1034)  Life Transition/Adjustment:   palliative care discussed   palliative care initiated  Intervention: Optimize Psychosocial Wellbeing  Recent Flowsheet Documentation  Taken 7/8/2021 1034 by Yanely Soto, MSW  Family/Support System Care:   support provided   self-care encouraged   caregiver stress acknowledged  Visit at bedside with patients wife and son.  Discussed palliative care consult, palliative support - provided with palliative care information and blanket.  Patient labored in breathing at time of visit, not responding per family.  Encouraged caregiver self-care.  Patient wife and son receptive to palliative support, active listening, validation of feelings.

## 2021-07-08 NOTE — NURSING NOTE
Daily Low Julio <=14    Julio score:10    At this time the patient's RN reports no new skin issues or needs.   Continue to provide good general skin care. Apply barrier cream daily/ PRN. Keep patient dry and turn regularly.  Please ensure that heels are elevated and offload and that it is reflected in the charting. The patient is on a  FLORA  mattress at this time.     Please contact [facility] if new skin issues arise.

## 2021-07-08 NOTE — CONSULTS
Floyd Heard MD  Consulting physician: Justina Lujan  Reason for referral: goc discussion, ACP, pt/family support  Chief Complaint   Patient presents with   • Stroke     HPI:   81yoM with history of low-grade left temporal lobe glioma, seizures, HTN, HLD who presented to the [facility] ED on 7/5/2021 as a code stroke, with vomiting, altered mental status, right sided weakness, dysarthria and aphasia. Patient also noted to have hypoxia in the ED- requiring 2-3 L on no oxygen at home baseline. Evaluated by the stroke team in the ED, CT imaging with left temporal lobe mass c/w prior MRI, MRI imaging did not reveal acute CVA.   Patient being managed for breakthrough seizures, possible underlying infection source, aspiration pneumonia, HTN, acute renal failure and ileus.   EEG on 7/6 showing multiple and frequent electrographic seizures originating in the left temporal lobe that resolved with medication adjustment.  Symptoms:   Patient is unresponsive, wife reports that she does not have any sense of connection with the patient, he did squeeze her hand one day. No history of chronic pain or anxiety.   Baseline - patient was independent with function, driving still, doing chores around the house prior to seizure episode  Advance care planning discussed:   Code Status:   Current Code Status     Date Active Code Status Order ID Comments User Context       7/7/2021 1740 No CPR [id number]  Justina Lujan MD Inpatient     Advance Care Planning Activity      Questions for Current Code Status     Question Answer Comment    Code Status No CPR     Medical Interventions (Level of Support Prior to Arrest) Limited     Limited Support to NOT Include Cardioversion/Defibrillation      Intubation      Vasopressors      Dialysis     Level Of Support Discussed With Next of Kin (If No Surrogate)         Advance Directive: no written document  Surrogate decision maker: wifeRoxy  Past Medical History:   Diagnosis Date   •  Abnormal MRI of head    • Generalized convulsive seizure (CMS/HCC)    • Glaucoma    • Hyperlipidemia    • Hypertension    • Seizures (CMS/HCC)      Past Surgical History:   Procedure Laterality Date   • NO PAST SURGERIES         Reviewed current scheduled and prn medications for route, type, dose and frequency.    Current Facility-Administered Medications   Medication Dose Route Frequency Provider Last Rate Last Admin   • acetaminophen (TYLENOL) tablet 650 mg  650 mg Oral Q4H PRN Anna Stanford MD        Or   • acetaminophen (TYLENOL) 160 MG/5ML solution 650 mg  650 mg Oral Q4H PRN Anna Stanford MD        Or   • acetaminophen (TYLENOL) suppository 650 mg  650 mg Rectal Q4H PRN Anna Stanford MD   650 mg at 07/06/21 0018   • bisacodyl (DULCOLAX) EC tablet 10 mg  10 mg Oral Daily Tyrell Osborne MD       • dextrose (D50W) 25 g/ 50mL Intravenous Solution 25 g  25 g Intravenous Q15 Min PRN Anna Stanford MD       • dextrose (GLUTOSE) oral gel 15 g  15 g Oral Q15 Min PRN Anna Stanford MD       • docusate sodium (COLACE) liquid 100 mg  100 mg Oral BID Justina Lujan MD       • fluticasone (FLONASE) 50 MCG/ACT nasal spray 1 spray  1 spray Each Nare Daily Anna Stanford MD   Stopped at 07/06/21 0801   • glucagon (human recombinant) (GLUCAGEN DIAGNOSTIC) injection 1 mg  1 mg Subcutaneous Q15 Min PRN Anna Stanford MD       • glycerin adult 1 suppository  1 suppository Rectal Daily PRN Brandon Solomon MD       • heparin (porcine) 5000 UNIT/ML injection 5,000 Units  5,000 Units Subcutaneous Q8H Anna Stanford MD   5,000 Units at 07/08/21 0522   • insulin lispro (humaLOG) injection 0-7 Units  0-7 Units Subcutaneous TID AC Anna Stanford MD   Stopped at 07/06/21 1205   • ipratropium-albuterol (DUO-NEB) nebulizer solution 3 mL  3 mL Nebulization 4x Daily - RT Анна Jade APRN   3 mL at 07/08/21 0743   • ipratropium-albuterol (DUO-NEB) nebulizer solution 3 mL  3 mL Nebulization Q4H PRN  Brandon Solomon MD       • labetalol (NORMODYNE,TRANDATE) injection 20 mg  20 mg Intravenous Q10 Min PRN Anna Stanford MD   20 mg at 07/07/21 1419   • lacosamide (VIMPAT) injection 50 mg  50 mg Intravenous Q12H Jeanette Pope MD   50 mg at 07/07/21 2229   • lactated ringers infusion  100 mL/hr Intravenous Continuous Brandon Solomon  mL/hr at 07/07/21 2148 100 mL/hr at 07/07/21 2148   • latanoprost (XALATAN) 0.005 % ophthalmic solution 1 drop  1 drop Both Eyes Nightly Anna Stanford MD   1 drop at 07/07/21 2202   • levETIRAcetam in NaCl 0.82% (KEPPRA) IVPB 500 mg  500 mg Intravenous Q12H Jeanette Pope MD   500 mg at 07/07/21 2203   • magnesium sulfate 4 gram infusion - Mg less than or equal to 1mg/dL  4 g Intravenous PRN Brandon Solomon MD        Or   • magnesium sulfate 3 gram infusion (1gm x 3) - Mg 1.1 - 1.5 mg/dL  1 g Intravenous PRN Brandon Solomon MD        Or   • Magnesium Sulfate 2 gram infusion- Mg 1.6 - 1.9 mg/dL  2 g Intravenous PRN Brandon Solomon MD       • methylnaltrexone (RELISTOR) injection 4 mg  4 mg Subcutaneous Every Other Day Tyrell Osborne MD   4 mg at 07/06/21 0832   • metoclopramide (REGLAN) injection 5 mg  5 mg Intravenous Q6H Tyrell Osborne MD   5 mg at 07/08/21 0522   • niCARdipine (CARDENE) 20 mg in 200 mL NS infusion  5-15 mg/hr Intravenous Titrated Justina Lujan MD 50 mL/hr at 07/08/21 0519 5 mg/hr at 07/08/21 0519   • ondansetron (ZOFRAN) tablet 4 mg  4 mg Oral Q6H PRN Anna Stanford MD       • piperacillin-tazobactam (ZOSYN) 3.375 g in iso-osmotic dextrose 50 ml (premix)  3.375 g Intravenous Q8H Anna Stanford MD   3.375 g at 07/08/21 0135   • polyvinyl alcohol (LIQUIFILM) 1.4 % ophthalmic solution 1 drop  1 drop Both Eyes Q1H PRN Anna Stanford MD       • sodium chloride 0.9 % flush 10 mL  10 mL Intravenous PRN Anna Stanford MD       • sodium chloride 0.9 % flush 10 mL  10 mL Intravenous Q12H  "Anna Stanford MD   10 mL at 07/07/21 2204   • sodium chloride 0.9 % flush 10 mL  10 mL Intravenous PRN Anna Stanford MD       • sodium chloride 0.9 % flush 10 mL  10 mL Intravenous Q12H Anna Stanford MD   10 mL at 07/07/21 2204   • sodium chloride 0.9 % flush 10 mL  10 mL Intravenous PRN Anna Stanford MD   10 mL at 07/07/21 1140   • timolol (TIMOPTIC) 0.25 % ophthalmic solution 1 drop  1 drop Both Eyes Q12H Anna Stanford MD   1 drop at 07/07/21 2203     lactated ringers, 100 mL/hr, Last Rate: 100 mL/hr (07/07/21 2148)  niCARdipine, 5-15 mg/hr, Last Rate: 5 mg/hr (07/08/21 0519)      •  acetaminophen **OR** acetaminophen **OR** acetaminophen  •  dextrose  •  dextrose  •  glucagon (human recombinant)  •  glycerin adult  •  ipratropium-albuterol  •  labetalol  •  magnesium sulfate **OR** magnesium sulfate in D5W 1g/100mL (PREMIX) **OR** magnesium sulfate  •  ondansetron  •  polyvinyl alcohol  •  sodium chloride  •  sodium chloride  •  sodium chloride  Allergies   Allergen Reactions   • Sulfa Antibiotics Hives     RASH     Family History   Problem Relation Age of Onset   • Alzheimer's disease Mother    • Depression Mother    • Dementia Mother    • Cancer Mother      Social History     Socioeconomic History   • Marital status:      Spouse name: Not on file   • Number of children: Not on file   • Years of education: Not on file   • Highest education level: Not on file   Tobacco Use   • Smoking status: Never Smoker   • Smokeless tobacco: Never Used   Substance and Sexual Activity   • Alcohol use: No   • Drug use: No   • Sexual activity: Defer     Review of Systems - +/- per HPI and symptom review.    PPS: 10%   /75   Pulse 106   Temp 98.8 °F (37.1 °C) (Axillary)   Resp (!) 38   Ht 175.3 cm (69.02\")   Wt 89.1 kg (196 lb 6.4 oz)   SpO2 92%   BMI 28.99 kg/m²   89.1 kg (196 lb 6.4 oz) Body mass index is 28.99 kg/m².  Intake & Output (last day)       07/07 0701 - 07/08 0700 07/08 0701 - 07/09 " 0700    I.V. (mL/kg)      IV Piggyback 50     Total Intake(mL/kg) 50 (0.6)     Urine (mL/kg/hr) 1150 (0.5)     Emesis/NG output 2000     Stool      Total Output 3150     Net -3100               Physical Exam:  General Appearance: No acute distress, ill appearing  Head: Normocephalic without obvious abnormality, atraumatic  Eyes: Conjunctivae and sclerae normal, no icterus, NAGI  Throat: No oral lesions, no thrush, oral mucosa with dried secretions on soft palate  Neck: No adenopathy, supple trachea, midline  Lungs: Clear to auscultation, respirations regular, even and labored with use of abdominal accessory muscles  RR 32/min  Heart: Regular rhythm and normal rate, normal S1  Abdomen: Normal bowel sounds, soft, non-distended, non-tender  Extremities: no redness, no cyanosis, no edema  Pulses: Distal pulses palpable and equal bilaterally  Skin: Warm and dry  Neurological: unresponsive to voice or touch, no myoclonus  Reviewed labs and diagnostic results.  Lab Results   Component Value Date    HGBA1C 5.3 02/25/2016     Results from last 7 days   Lab Units 07/08/21  0656   WBC 10*3/mm3 14.88*   HEMOGLOBIN g/dL 15.0   HEMATOCRIT % 46.7   PLATELETS 10*3/mm3 176     Results from last 7 days   Lab Units 07/08/21  0656 07/07/21  0909   SODIUM mmol/L 153* 139   POTASSIUM mmol/L 3.4* 4.0   CHLORIDE mmol/L 108* 101   CO2 mmol/L 34.0* 24.0   BUN mg/dL 52* 50*   CREATININE mg/dL 1.68* 1.95*   CALCIUM mg/dL 8.6 9.0   BILIRUBIN mg/dL  --  0.8   ALK PHOS U/L  --  70   ALT (SGPT) U/L  --  18   AST (SGOT) U/L  --  52*   GLUCOSE mg/dL 161* 160*     Results from last 7 days   Lab Units 07/08/21  0656   SODIUM mmol/L 153*   POTASSIUM mmol/L 3.4*   CHLORIDE mmol/L 108*   CO2 mmol/L 34.0*   BUN mg/dL 52*   CREATININE mg/dL 1.68*   GLUCOSE mg/dL 161*   CALCIUM mg/dL 8.6     Imaging Results (Last 72 Hours)     Procedure Component Value Units Date/Time    XR Chest 1 View [[id number]] Collected: 07/05/21 1847     Updated: 07/07/21 0881     Narrative:      EXAMINATION: XR CHEST 1 VW-      INDICATION: Concern for aspiration; R41.82-Altered mental status,  unspecified; R11.2-Nausea with vomiting, unspecified; I63.9-Cerebral  infarction, unspecified; I16.1-Hypertensive emergency; Z87.898-Personal  history of other specified conditions; R13.10-Dysphagia, unspecified;  R41.841-Cognitive communication deficit; R47.1-Dysarthria and anarthria.        COMPARISON: 07/05/2021.     FINDINGS: Portable chest reveals heart to be borderline enlarged. Very  minimal increased markings are identified at the right lung base in  which early infiltrate cannot be completely excluded. Continued followup  is recommended. Degenerative change is seen within the spine. No  definite pleural effusion.           Impression:      Slight increase seen in the markings at the right lung base  in which early infiltrate cannot be excluded. Continued followup is  recommended as indicated.     D:  07/05/2021  E:  07/06/2021     This report was finalized on 7/7/2021 8:37 AM by Dr. Mya Borja MD.       XR Abdomen KUB [[id number]] Collected: 07/06/21 1431     Updated: 07/06/21 1451    Narrative:      EXAMINATION: XR ABDOMEN/KUB-07/06/2021:      INDICATION: Ileus; R41.82-Altered mental status, unspecified;  R11.2-Nausea with vomiting, unspecified; I63.9-Cerebral infarction,  unspecified; I16.1-Hypertensive emergency; Z87.898-Personal history of  other specified conditions; R13.10-Dysphagia, unspecified;  R41.841-Cognitive communication deficit; R47.1-Dysarthria and anarthria.        COMPARISON: 07/06/2021.     FINDINGS: Nasogastric tube in place. Nonspecific, nonobstructive bowel  gas pattern with moderate colonic stool burden extending to the  rectosigmoid colon. Chen catheter in situ. Degenerative changes of the  scoliotic spine and pelvis.       Impression:      Nonspecific, nonobstructive bowel gas pattern with decreased  small bowel distention from prior comparison, however,  persistent  colonic stool burden concerning for constipation components without  obstructive pattern.     D:  07/06/2021  E:  07/06/2021             XR Abdomen KUB [[id number]] Collected: 07/06/21 0328     Updated: 07/06/21 0330    Narrative:      ABDOMEN, 7/6/2021 (02:36)      HISTORY:    NG tube placement.      TECHNIQUE:  AP portable radiograph of the upper abdomen.      FINDINGS:  NG tube tip is in the midportion of the stomach. The stomach is decompressed.    Signer Name: Dain Kirk MD   Signed: 7/6/2021 3:28 AM   Workstation Name: [email or web address]    [facility]    CT Chest Without Contrast Diagnostic [[id number]] Collected: 07/05/21 2236     Updated: 07/05/21 2238    Narrative:      CT CHEST, ABDOMEN AND PELVIS, NONCONTRAST, 7/5/2021    HISTORY:  81-year-old male with past history of temporal lobe mass, seizures admitted to the hospital yesterday with vomiting and hypoxia. Concern for aspiration. Acute stroke assessment yesterday was negative.    TECHNIQUE:  CT imaging of the chest, abdomen and pelvis without IV contrast. Radiation dose reduction techniques included automated exposure control. Radiation audit for CT and nuclear cardiology exams in the last 12 months: 6.    COMPARISON:  *  CTA chest earlier today.    CHEST FINDINGS:  The previous examination today showed fluid distended stomach and hiatal hernia with a mildly dilated, fluid-filled thoracic esophagus. On the current study, the stomach and hiatal hernia are partially decompressed, and esophageal dilatation is mild.  This may have been decompressed through vomiting. No NG tube is present.    There is mild hazy airspace infiltrate in the infrahilar portions of both lungs that is new since the earlier study. Early aspiration pneumonia is a consideration given the history. Atelectasis in the dependent posterior costophrenic angles is unchanged.  The nondependent portions of both lungs remain clear. There is no pleural  effusion. Trachea and mainstem bronchi are patent. No mass or adenopathy is seen within the chest. Heart size is normal, there is no pericardial effusion. Normal caliber thoracic  aorta.    RENAL/URINARY FINDINGS:  The urinary bladder is markedly distended with estimated volume 930 cc. There is slight dilatation of the right and left renal collecting systems, and soft tissue stranding surrounds both kidneys.. 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 Correlate  for urinary retention, bladder outflow obstruction. Bilateral small benign renal cysts are present. There is an indeterminate right mid renal mass measuring about 3.0 cm which could represent an atypical cyst or a solid mass. Recommend follow-up renal  ultrasound examination. 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 Chronic left upper pole renal parenchymal scarring.    ABDOMEN FINDINGS:  Small stone within nondistended gallbladder. No bile duct dilatation. Liver, pancreas and spleen are unremarkable. Normal caliber abdominal aorta. Small bowel and colon are normal in caliber and appearance.    PELVIS FINDINGS:  Enlarged prostate with benign-appearing calcifications. Moderately large volume stool distending the rectum. No free pelvic fluid. Severe degenerative disc disease and facet arthropathy at L5-S1.      Impression:      1.  Fluid distended stomach, fluid-filled hiatal hernia and fluid dilated thoracic esophagus present on the earlier study today, now partially decompressed.  2.  New mild airspace infiltrates in the posterior infrahilar lower lobes. Given the history, early aspiration pneumonia is a consideration.  3.  Suspected bladder outflow obstruction with markedly distended urinary bladder (930 cc), mild bilateral renal collecting system dilatation and perinephric soft tissue stranding. Enlarged prostate with possible TURP defect centrally.  4.  Stool distended rectum.  5.  Cholelithiasis.    Signer Name:  Dain Kirk MD   Signed: 7/5/2021 10:36 PM   Workstation Name: [facility]    [facility] [place]    CT Abdomen Pelvis Without Contrast [[id number]] Collected: 07/05/21 2236     Updated: 07/05/21 2238    Narrative:      CT CHEST, ABDOMEN AND PELVIS, NONCONTRAST, 7/5/2021    HISTORY:  81-year-old male with past history of temporal lobe mass, seizures admitted to the hospital yesterday with vomiting and hypoxia. Concern for aspiration. Acute stroke assessment yesterday was negative.    TECHNIQUE:  CT imaging of the chest, abdomen and pelvis without IV contrast. Radiation dose reduction techniques included automated exposure control. Radiation audit for CT and nuclear cardiology exams in the last 12 months: 6.    COMPARISON:  *  CTA chest earlier today.    CHEST FINDINGS:  The previous examination today showed fluid distended stomach and hiatal hernia with a mildly dilated, fluid-filled thoracic esophagus. On the current study, the stomach and hiatal hernia are partially decompressed, and esophageal dilatation is mild.  This may have been decompressed through vomiting. No NG tube is present.    There is mild hazy airspace infiltrate in the infrahilar portions of both lungs that is new since the earlier study. Early aspiration pneumonia is a consideration given the history. Atelectasis in the dependent posterior costophrenic angles is unchanged.  The nondependent portions of both lungs remain clear. There is no pleural effusion. Trachea and mainstem bronchi are patent. No mass or adenopathy is seen within the chest. Heart size is normal, there is no pericardial effusion. Normal caliber thoracic  aorta.    RENAL/URINARY FINDINGS:  The urinary bladder is markedly distended with estimated volume 930 cc. There is slight dilatation of the right and left renal collecting systems, and soft tissue stranding surrounds both kidneys.. 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 Correlate  for urinary  retention, bladder outflow obstruction. Bilateral small benign renal cysts are present. There is an indeterminate right mid renal mass measuring about 3.0 cm which could represent an atypical cyst or a solid mass. Recommend follow-up renal  ultrasound examination. 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 Chronic left upper pole renal parenchymal scarring.    ABDOMEN FINDINGS:  Small stone within nondistended gallbladder. No bile duct dilatation. Liver, pancreas and spleen are unremarkable. Normal caliber abdominal aorta. Small bowel and colon are normal in caliber and appearance.    PELVIS FINDINGS:  Enlarged prostate with benign-appearing calcifications. Moderately large volume stool distending the rectum. No free pelvic fluid. Severe degenerative disc disease and facet arthropathy at L5-S1.      Impression:      1.  Fluid distended stomach, fluid-filled hiatal hernia and fluid dilated thoracic esophagus present on the earlier study today, now partially decompressed.  2.  New mild airspace infiltrates in the posterior infrahilar lower lobes. Given the history, early aspiration pneumonia is a consideration.  3.  Suspected bladder outflow obstruction with markedly distended urinary bladder (930 cc), mild bilateral renal collecting system dilatation and perinephric soft tissue stranding. Enlarged prostate with possible TURP defect centrally.  4.  Stool distended rectum.  5.  Cholelithiasis.    Signer Name: Dain Kirk MD   Signed: 7/5/2021 10:36 PM   Workstation Name: [id number]    [facility] [place]    CT Angiogram Chest With & Without Contrast [[id number]] Collected: 07/05/21 1300     Updated: 07/05/21 1419    Narrative:      EXAMINATION: CT ANGIOGRAM CHEST W WO CONTRAST-07/05/2021:      INDICATION: PE suspected, low/intermediate prob, positive D-dimer;  R41.82-Altered mental status, unspecified; R11.2-Nausea with vomiting,  unspecified; I63.9-Cerebral  infarction, unspecified; I16.1-Hypertensive  emergency; Z87.898-Personal history of other specified conditions;  R13.10-Dysphagia, unspecified; R41.841-Cognitive communication deficit;  R47.1-Dysarthria and anarthria, shortness of breath and chest pain.     TECHNIQUE: Multiple axial CT imaging was obtained of the chest with and  without the administration of intravenous contrast according to the CT  angio protocol. 2-D coronal reformatted images were submitted to further  facilitate diagnostic accuracy and treatment planning.     The radiation dose reduction device was turned on for each scan per the  ALARA (As Low as Reasonably Achievable) protocol.     COMPARISON: NONE.     FINDINGS: The thyroid is homogeneous in appearance. No mediastinal mass  or adenopathy. The cardiac chambers are mildly enlarged. There is a  large hiatal hernia. Atelectatic changes seen medially within the lower  lung fields bilaterally. The lung fields are clear. No focal parenchymal  opacification present. Degenerative changes seen within the spine. No  filling defect in the pulmonary arteries to suggest evidence of  pulmonary embolism. Degenerative changes seen within the spine. The  visualized upper abdomen reveals a stone in the gallbladder. Vicarious  excretion of contrast seen within the gallbladder. Contrast seen in both  renal collecting systems with a cyst identified on the kidney  bilaterally. The pancreas is homogeneous.       Impression:      Atelectatic changes seen in the lung bases bilaterally. No  evidence of parenchymal consolidation, infectious process or pulmonary  nodule. No evidence of pulmonary embolism.     D:  07/05/2021  E:  07/05/2021           This report was finalized on 7/5/2021 2:16 PM by Dr. Mya Borja MD.           Active Hospital Problems     Diagnosis   POA   • **Suspected cerebrovascular accident (CVA) [R09.89]   Yes   • Hypertensive urgency [I16.0]   Yes   • Acute respiratory failure with hypoxia  (CMS/[facility]) [J96.01]   Yes   • Altered mental status [R41.82]   Yes   • Hypertension [I10]   Yes   • Hyperlipidemia [E78.5]   Yes   • Generalized convulsive seizures (CMS/[facility]) [R56.9]         Impression: 81 y.o. male with breakthrough seizure, low grade Left temporal glioma  Plan:   Dyspnea/tachypnea - prn iv morphine and lorazepam  Nursing reports that morphine 1mg dose was ineffective per wife report. Will increase to morphine 2mg for tachypnea and increase work of breathing    Goals of care discussion - met with wife and son, Miguel Angel, at bedside.  Reviewed current clinical status, expected outcomes if patient persists with unresponsive state, nutrition support, resuscitation efforts, patient's wishes would be, choices with interventions and developing a plan of care that meets patient's and family's goals.   Answered questions and offered suggestions with ongoing clinical decisions with making notes and having other family listening for support.   Appreciate Dr Osborne following up with son, Miguel Angel, and updating him.   Palliative team provide support to pt/family.    Cherrie Dykes, APRN  [phone]  07/08/21  09:43 EDT      Time: 90 minutes spent reviewing medical and medication records, assessing and examining patient, discussing with patient, family and nursing staff, answering questions, formulating a plan and documentation of care. > 50% time spent face to face

## 2021-07-08 NOTE — PROGRESS NOTES
"Patient Name:  Jonatan Ocampo  YOB: 1940  [id number]    Surgery Progress Note    Date of visit: 7/8/2021    Subjective   Subjective: No change. Still will not regard examiner. NG aspirate with some coffee ground material, no gross blood.         Objective     Objective:     /75   Pulse 100   Temp 98.7 °F (37.1 °C) (Axillary)   Resp 22   Ht 175.3 cm (69.02\")   Wt 89.1 kg (196 lb 6.4 oz)   SpO2 91%   BMI 28.99 kg/m²     Intake/Output Summary (Last 24 hours) at 7/8/2021 0702  Last data filed at 7/8/2021 0317  Gross per 24 hour   Intake 50 ml   Output 3150 ml   Net -3100 ml       CV:  Rhythm  regular and rate regular   L:  Rhonchi  to auscultation bilaterally   Abd:  Bowel sounds positive , soft, nontender  Ext:  No cyanosis, clubbing, edema    Recent labs that are back at this time have been reviewed.        Assessment/Plan     Assessment/ Plan:    Problem List Items Addressed This Visit        Neuro    Altered mental status - Primary      Other Visit Diagnoses     Non-intractable vomiting with nausea, unspecified vomiting type    - No evidence of bowel obstruction, I suspect his emesis was related to his recent and ongoing seizures, resulting in aspiration.  Continue with antibiotic therapy.  Overall prognosis remains quite poor, I would continue discussions with the family, who have just made the patient DNR.    Cerebrovascular accident (CVA), unspecified mechanism (CMS/HCC)        Hypertensive emergency        History of seizure        Dysphagia, unspecified type        Cognitive communication deficit        Dysarthria               Active Hospital Problems    Diagnosis  POA   • **Suspected cerebrovascular accident (CVA) [R09.89]  Yes   • Hypertensive urgency [I16.0]  Yes   • Acute respiratory failure with hypoxia (CMS/HCC) [J96.01]  Yes   • Altered mental status [R41.82]  Yes   • Hypertension [I10]  Yes   • Hyperlipidemia [E78.5]  Yes   • Generalized convulsive seizures (CMS/HCC) " [R56.9]  Yes      Resolved Hospital Problems   No resolved problems to display.              Tyrell Osborne MD  7/8/2021  07:02 EDT

## 2021-07-09 PROBLEM — G93.40 ENCEPHALOPATHY ACUTE: Status: ACTIVE | Noted: 2021-01-01

## 2021-07-09 NOTE — DISCHARGE SUMMARY
[facility] Medicine Services  DISCHARGE SUMMARY    Patient Name: Jonatan Ocampo  : 1940  MRN: [id number]    Date of Admission: 2021  4:14 AM  Date of Discharge:  2021  Primary Care Physician: Floyd Heard MD    Consults     Date and Time Order Name Status Description    2021  8:48 AM Inpatient Palliative Care MD Consult Completed     2021 12:34 AM Inpatient General Surgery Consult Completed     2021  7:21 AM Inpatient Neurology Consult General Completed     2021  5:13 AM Inpatient Neurology Consult Stroke Completed           Hospital Course     Presenting Problem:   Altered mental status, unspecified altered mental status type [R41.82]    Active Hospital Problems    Diagnosis  POA   • **Suspected cerebrovascular accident (CVA) [R09.89]  Yes   • Hypertensive urgency [I16.0]  Yes   • Acute respiratory failure with hypoxia (CMS/HCC) [J96.01]  Yes   • Altered mental status [R41.82]  Yes   • Hypertension [I10]  Yes   • Hyperlipidemia [E78.5]  Yes   • 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          Hospital Course:  Jonatan Ocampo is a 81 y.o. male with h/o left temporal mass, seizures, HTN, HLD who presented to the [facility] ED 7/5 AM as a code stroke with altered mental status, right sided weakness, dysarthria and aphasia. Patient also noted to have vomiting and hypoxia in the ED- requiring 2-3 L on no home supplemental O2 baseline. Was evaluated by the stroke team in the ED, CT imaging with left temporal lobe mass c/w prior MRI, MRI imaging did not reveal acute CVA. Patient was admitted for stroke workup and AMS.  Pt had passed a swallow eval initially and, subsequently, he aspirated.  His mental status never fully recovered. He was transferred to another floor for continuous EEG monitoring, however, despite managing his seizures and treating him for his aspiration  pneumonitis, he continued to decline. His mental status never recovered and family eventually decided to make him comfort care. He is being transferred to inpatient hospice today.       Day of Discharge     HPI:   See progress note    Review of Systems  ERASTO    Vital Signs:   Temp:  [98.5 °F (36.9 °C)-100.3 °F (37.9 °C)] 99 °F (37.2 °C)  Heart Rate:  [] 143  Resp:  [22-38] 28  BP: (105-168)/(69-88) 105/87     Physical Exam:  See progress note    Pertinent  and/or Most Recent Results     LAB RESULTS:      Lab 07/09/21  0603 07/08/21  0656 07/07/21  0909 07/06/21  0529 07/06/21  0007 07/05/21  2149 07/05/21  0702 07/05/21  0431   WBC 17.71* 14.88* 15.04* 18.79*  --  16.62*  --  8.13   HEMOGLOBIN 13.8 15.0 15.8 17.4  --  17.2  --  14.0   HEMATOCRIT 44.6 46.7 49.0 54.3*  --  49.8  --  41.1   PLATELETS 148 176 179 225  --  237  --  223   NEUTROS ABS  --   --  13.14* 16.88*  --  14.63*  --  5.58   IMMATURE GRANS (ABS)  --   --  0.03 0.05  --   --   --  0.04   LYMPHS ABS  --   --  0.72 1.02  --   --   --  1.88   MONOS ABS  --   --  0.89 0.81  --   --   --  0.48   EOS ABS  --   --  0.24 0.00  --  0.00  --  0.13   .9* 98.3* 98.6* 98.0*  --  91.7  --  91.5   PROCALCITONIN  --   --   --   --   --  2.65*  --  0.08   LACTATE  --   --   --   --  2.3* 3.0*  --   --    D DIMER QUANT  --   --   --   --   --   --  9.53*  --          Lab 07/09/21  0603 07/08/21 2040 07/08/21 0656 07/07/21  0909 07/06/21  0529 07/05/21  2149 07/05/21  4191   SODIUM 149*  --  153* 139 140 139 140   POTASSIUM 3.2* 3.2* 3.4* 4.0 4.2 4.6 3.9   CHLORIDE 111*  --  108* 101 105 103 106   CO2 30.0*  --  34.0* 24.0 18.0* 22.0 22.0   ANION GAP 8.0  --  11.0 14.0 17.0* 14.0 12.0   BUN 37*  --  52* 50* 33* 28* 12   CREATININE 1.13  --  1.68* 1.95* 2.34* 2.29* 0.97   GLUCOSE 141*  --  161* 160* 144* 176* 178*   CALCIUM 8.3*  --  8.6 9.0 8.9 8.7 8.7   MAGNESIUM  --  2.2  --   --   --  2.0  --    TSH  --   --   --   --   --   --  3.950         Lab  07/07/21  0909 07/06/21  0529 07/05/21  0431   TOTAL PROTEIN 7.1 7.5 7.2   ALBUMIN 3.00* 3.40* 3.90   GLOBULIN 4.1 4.1 3.3   ALT (SGPT) 18 19 14   AST (SGOT) 52* 38 22   BILIRUBIN 0.8 1.1 0.6   ALK PHOS 70 72 61         Lab 07/05/21  2149 07/05/21  0431   PROBNP 8,004.0* 360.1   TROPONIN T  --  <0.010                 Lab 07/05/21  2209 07/05/21  0427   PH, ARTERIAL 7.365 7.43   PCO2, ARTERIAL 42.2  --    PO2 ART 59.2*  --    FIO2 100  --    HCO3 ART 24.1  --    BASE EXCESS ART -1.4*  --    CARBOXYHEMOGLOBIN 0.8  --      Brief Urine Lab Results  (Last result in the past 365 days)      Color   Clarity   Blood   Leuk Est   Nitrite   Protein   CREAT   Urine HCG        07/05/21 1138 Orange Clear Large (3+) Negative Negative 30 mg/dL (1+)             Microbiology Results (last 10 days)     Procedure Component Value - Date/Time    Blood Culture - Blood, Hand, Right [[id number]] Collected: 07/05/21 2241    Lab Status: Preliminary result Specimen: Blood from Hand, Right Updated: 07/08/21 2315     Blood Culture No growth at 3 days    Narrative:      Aerobic bottle only      Blood Culture - Blood, Hand, Right [[id number]] Collected: 07/05/21 2159    Lab Status: Preliminary result Specimen: Blood from Hand, Right Updated: 07/08/21 2315     Blood Culture No growth at 3 days    Narrative:      Aerobic bottle only      COVID PRE-OP / PRE-PROCEDURE SCREENING ORDER (NO ISOLATION) - Swab, Nasopharynx [[id number]]  (Normal) Collected: 07/05/21 1139    Lab Status: Final result Specimen: Swab from Nasopharynx Updated: 07/05/21 1254    Narrative:      The following orders were created for panel order COVID PRE-OP / PRE-PROCEDURE SCREENING ORDER (NO ISOLATION) - Swab, Nasopharynx.  Procedure                               Abnormality         Status                     ---------                               -----------         ------                     COVID-19,CEPHEID,BRIANNA IN-...[[id number]]  Normal              Final result                  Please view results for these tests on the individual orders.    COVID-19,CEPHEID,BRIANNA IN-HOUSE(OR EMERGENT/ADD-ON),NP SWAB IN TRANSPORT MEDIA 3-4 HR TAT - Swab, Nasopharynx [[id number]]  (Normal) Collected: 07/05/21 1139    Lab Status: Final result Specimen: Swab from Nasopharynx Updated: 07/05/21 1254     COVID19 Not Detected    Narrative:      Fact sheet for providers: https://www.fda.gov/media/597344/download     Fact sheet for patients: https://www.fda.gov/media/315565/download  Fact sheet for providers: https://www.fda.gov/media/638822/download     Fact sheet for patients: https://www.fda.gov/media/070146/download          Adult Transthoracic Echo Complete W/ Cont if Necessary Per Protocol    Result Date: 7/6/2021  · Estimated left ventricular EF = 65% · The cardiac valves are anatomically and functionally normal.      EEG    Result Date: 7/9/2021  Reason for referral: 81 y.o.male with seizures, altered mental status Technical Summary:  A 19 channel digital EEG was performed using the international 10-20 placement system, including eye leads and EKG leads. Duration: 20 minutes Video: None Findings: The patient is lethargic.  The dominant finding on the study is of frequent sharp and slow wave discharges with phase reversal over the left temporal head leads.  These are seen throughout the tracing.  Underlying medium amplitude 4-6 Hz delta and theta activity is seen over the left hemisphere as well as over the right hemisphere.  No electrographic seizures are seen.  No periodic discharges are seen.  Photic stimulation and hyperventilation are not performed. EKG: Irregular, 80-90 bpm     Frequent left temporal sharp/slow wave discharges No electrographic seizures are seen Underlying background is moderate generalized slow This report is transcribed using the Dragon dictation system.      EEG    Result Date: 7/8/2021  Reason for referral: 81 y.o.male with seizures Technical Summary:  A 19 channel digital EEG was  performed using the international 10-20 placement system, including eye leads and EKG leads. Duration: 21 minutes Video: Off Findings: The patient is lethargic and moaning.  The dominant finding is of extremely frequent sharp wave discharges with phase reversal at T3/T5 and the posterior temporal head lead.  At times these occur in a periodic discharge pattern at slightly slower than 1 Hz.  Underlying background shows low to medium amplitude 3-6 Hz intermixed delta and theta activity.  Frequencies are somewhat faster over the right hemisphere, being typically seen in the 5-8 Hz theta range.  Discrete electrographic seizures or not seen.  Photic stimulation does not change the background.  Hyperventilation is not performed. EKG: Regular, 80 bpm     Extremely frequent left posterior temporal sharp/slow wave discharges Lateralized periodic discharges, slightly slower than 1 Hz, left posterior temporal Discrete electrographic seizures or not seen Underlying background is moderate generalized slow, greater on the left This report is transcribed using the Dragon dictation system.      CT Abdomen Pelvis Without Contrast    Result Date: 7/5/2021  CT CHEST, ABDOMEN AND PELVIS, NONCONTRAST, 7/5/2021 HISTORY: 81-year-old male with past history of temporal lobe mass, seizures admitted to the hospital yesterday with vomiting and hypoxia. Concern for aspiration. Acute stroke assessment yesterday was negative. TECHNIQUE: CT imaging of the chest, abdomen and pelvis without IV contrast. Radiation dose reduction techniques included automated exposure control. Radiation audit for CT and nuclear cardiology exams in the last 12 months: 6. COMPARISON: *  CTA chest earlier today. CHEST FINDINGS: The previous examination today showed fluid distended stomach and hiatal hernia with a mildly dilated, fluid-filled thoracic esophagus. On the current study, the stomach and hiatal hernia are partially decompressed, and esophageal dilatation is  mild. This may have been decompressed through vomiting. No NG tube is present. There is mild hazy airspace infiltrate in the infrahilar portions of both lungs that is new since the earlier study. Early aspiration pneumonia is a consideration given the history. Atelectasis in the dependent posterior costophrenic angles is unchanged. The nondependent portions of both lungs remain clear. There is no pleural effusion. Trachea and mainstem bronchi are patent. No mass or adenopathy is seen within the chest. Heart size is normal, there is no pericardial effusion. Normal caliber thoracic aorta. RENAL/URINARY FINDINGS: The urinary bladder is markedly distended with estimated volume 930 cc. There is slight dilatation of the right and left renal collecting systems, and soft tissue stranding surrounds both kidneys.. 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 Correlate for urinary retention, bladder outflow obstruction. Bilateral small benign renal cysts are present. There is an indeterminate right mid renal mass measuring about 3.0 cm which could represent an atypical cyst or a solid mass. Recommend follow-up renal ultrasound examination. 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 Chronic left upper pole renal parenchymal scarring. ABDOMEN FINDINGS: Small stone within nondistended gallbladder. No bile duct dilatation. Liver, pancreas and spleen are unremarkable. Normal caliber abdominal aorta. Small bowel and colon are normal in caliber and appearance. PELVIS FINDINGS: Enlarged prostate with benign-appearing calcifications. Moderately large volume stool distending the rectum. No free pelvic fluid. Severe degenerative disc disease and facet arthropathy at L5-S1.     1.  Fluid distended stomach, fluid-filled hiatal hernia and fluid dilated thoracic esophagus present on the earlier study today, now partially decompressed. 2.  New mild airspace infiltrates in the posterior infrahilar lower  lobes. Given the history, early aspiration pneumonia is a consideration. 3.  Suspected bladder outflow obstruction with markedly distended urinary bladder (930 cc), mild bilateral renal collecting system dilatation and perinephric soft tissue stranding. Enlarged prostate with possible TURP defect centrally. 4.  Stool distended rectum. 5.  Cholelithiasis. Signer Name: Dain Kirk MD  Signed: 7/5/2021 10:36 PM  [facility]station [facility]: [facility]  [facility]    EEG Continuous Monitoring With Video    Result Date: 7/7/2021  Date of Procedure: 7-6 at 7:45 AM through 7-7 at 7:30 AM Reason for referral: 81 y.o.male with recurrent seizures, ICU monitoring Technical summary: A 19 channel digital EEG is performed using the international 10-20 placement system, including eye leads and EKG leads.  Split screen technology with video/EEG correlation is used.  .  A patient event button is offered. Findings: Multiple time samples are reviewed.  At the beginning of the study, the dominant finding is of ongoing spike and slow wave discharges with phase reversal over the left posterior temporal head leads.  This correlates with versive movement of the head and eyes to the right.  Underlying medium amplitude 3-7 Hz intermixed delta and theta activity is seen over both hemispheres.  As the study proceeds, multiple discrete electrographic seizures are seen.  These typically begin with low amplitude rhythmic beta activity which originates from the left posterior temporal head region that increases in amplitude and slows in frequency down to the theta range.  Waveform morphology then transitions to spike and slow wave discharges.  Discharge typically will last for 30-90 seconds, and is followed by generalized slowing and mild suppression.  Numerous brief electrographic seizures characterized the first portion of the tracing.  Following administration of antiepileptic drugs, there is eventual sensation of  electrographic seizures.  However frequent spike and slow wave discharges are seen, again with phase reversal over the left posterior temporal head region.  These occur frequently, but overnight no rhythmic discharges or electrographic seizures are seen.  The next morning, the background shows low to medium amplitude 4-7 Hz intermixed delta and theta activity over the right hemisphere, rest somewhat slower delta and theta activity are seen over the left hemisphere, along with frequent spike/slow wave discharges over the left temporal leads.       Multiple electrographic seizures, origin left posterior temporal, with clinical correlate of versive head/eye movements to the right, responsive to anticonvulsive agents Frequent left posterior temporal spike/slow wave discharges Underlying background is moderate generalized slow, greater over the left This report is transcribed using the Dragon dictation system.     CT Angiogram Neck    Result Date: 7/5/2021  CTA HEAD AND NECK WITH AI ANALYSIS FOR LVO, 7/5/2021 HISTORY: 81-year-old male in the ED with new onset decreased responsiveness. Right side facial droop, difficulty with speech. 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 TECHNIQUE: CT angiogram of the head and neck with contrast. CTA images were reformatted with 3-D postprocessing. Evaluation for a significant carotid arterial stenosis is based on NASCET criteria. Radiation dose reduction techniques included automated exposure control. Radiation audit for known CT and nuclear cardiology exams in the last 12 months: 0. AI analysis for LVO was utilized. COMPARISON: CT brain and CT perfusion exams tonight. CTA NECK: Normal aortic arch with no proximal great vessel stenosis. The vertebral arteries are widely patent with right vertebral dominance. Cervical carotid bifurcations are widely patent with 0% stenosis in both internal carotid arteries by  NASCET criteria. CTA HEAD: Intracranially, there is symmetric distal vascular contrast distribution in the anterior, middle and posterior cerebral artery territories. Fetal type supply of both posterior cerebral arteries. Moderately severe focal atherosclerotic stenosis of the right P1 PCA segment. No additional evidence of intracranial arterial flow limiting stenosis. No large vessel occlusion. No intracranial aneurysm or vascular malformation is identified. Dural venous sinuses appear normal. No abnormal enhancement involving the infiltrative left temporal lobe tumor.     1.  No significant extracranial carotid or vertebral artery stenosis. 2.  Moderately severe focal stenosis of the right P1 PCA segment, likely atherosclerotic. Bilateral fetal-type PCom supply both posterior cerebral arteries. No additional evidence of intracranial flow limiting stenosis or large vessel occlusion. Signer Name: Dain Kirk MD  Signed: 7/5/2021 5:12 AM  Workstation Name: [email or web address]  [facility]    CT Chest Without Contrast Diagnostic    Result Date: 7/5/2021  CT CHEST, ABDOMEN AND PELVIS, NONCONTRAST, 7/5/2021 HISTORY: 81-year-old male with past history of temporal lobe mass, seizures admitted to the hospital yesterday with vomiting and hypoxia. Concern for aspiration. Acute stroke assessment yesterday was negative. TECHNIQUE: CT imaging of the chest, abdomen and pelvis without IV contrast. Radiation dose reduction techniques included automated exposure control. Radiation audit for CT and nuclear cardiology exams in the last 12 months: 6. COMPARISON: *  CTA chest earlier today. CHEST FINDINGS: The previous examination today showed fluid distended stomach and hiatal hernia with a mildly dilated, fluid-filled thoracic esophagus. On the current study, the stomach and hiatal hernia are partially decompressed, and esophageal dilatation is mild. This may have been decompressed through vomiting. No NG  tube is present. There is mild hazy airspace infiltrate in the infrahilar portions of both lungs that is new since the earlier study. Early aspiration pneumonia is a consideration given the history. Atelectasis in the dependent posterior costophrenic angles is unchanged. The nondependent portions of both lungs remain clear. There is no pleural effusion. Trachea and mainstem bronchi are patent. No mass or adenopathy is seen within the chest. Heart size is normal, there is no pericardial effusion. Normal caliber thoracic aorta. RENAL/URINARY FINDINGS: The urinary bladder is markedly distended with estimated volume 930 cc. There is slight dilatation of the right and left renal collecting systems, and soft tissue stranding surrounds both kidneys.. 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 Correlate for urinary retention, bladder outflow obstruction. Bilateral small benign renal cysts are present. There is an indeterminate right mid renal mass measuring about 3.0 cm which could represent an atypical cyst or a solid mass. Recommend follow-up renal ultrasound examination. 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 Chronic left upper pole renal parenchymal scarring. ABDOMEN FINDINGS: Small stone within nondistended gallbladder. No bile duct dilatation. Liver, pancreas and spleen are unremarkable. Normal caliber abdominal aorta. Small bowel and colon are normal in caliber and appearance. PELVIS FINDINGS: Enlarged prostate with benign-appearing calcifications. Moderately large volume stool distending the rectum. No free pelvic fluid. Severe degenerative disc disease and facet arthropathy at L5-S1.     1.  Fluid distended stomach, fluid-filled hiatal hernia and fluid dilated thoracic esophagus present on the earlier study today, now partially decompressed. 2.  New mild airspace infiltrates in the posterior infrahilar lower lobes. Given the history, early aspiration pneumonia is a  consideration. 3.  Suspected bladder outflow obstruction with markedly distended urinary bladder (930 cc), mild bilateral renal collecting system dilatation and perinephric soft tissue stranding. Enlarged prostate with possible TURP defect centrally. 4.  Stool distended rectum. 5.  Cholelithiasis. Signer Name: Dain Kirk MD  Signed: 7/5/2021 10:36 PM  Workstation Name: [email or web address]  [facility]    CT Angiogram Chest With & Without Contrast    Result Date: 7/5/2021  EXAMINATION: CT ANGIOGRAM CHEST W WO CONTRAST-07/05/2021:  INDICATION: PE suspected, low/intermediate prob, positive D-dimer; R41.82-Altered mental status, unspecified; R11.2-Nausea with vomiting, unspecified; I63.9-Cerebral infarction, unspecified; I16.1-Hypertensive emergency; Z87.898-Personal history of other specified conditions; R13.10-Dysphagia, unspecified; R41.841-Cognitive communication deficit; R47.1-Dysarthria and anarthria, shortness of breath and chest pain.  TECHNIQUE: Multiple axial CT imaging was obtained of the chest with and without the administration of intravenous contrast according to the CT angio protocol. 2-D coronal reformatted images were submitted to further facilitate diagnostic accuracy and treatment planning.  The radiation dose reduction device was turned on for each scan per the ALARA (As Low as Reasonably Achievable) protocol.  COMPARISON: NONE.  FINDINGS: The thyroid is homogeneous in appearance. No mediastinal mass or adenopathy. The cardiac chambers are mildly enlarged. There is a large hiatal hernia. Atelectatic changes seen medially within the lower lung fields bilaterally. The lung fields are clear. No focal parenchymal opacification present. Degenerative changes seen within the spine. No filling defect in the pulmonary arteries to suggest evidence of pulmonary embolism. Degenerative changes seen within the spine. The visualized upper abdomen reveals a stone in the gallbladder.  Vicarious excretion of contrast seen within the gallbladder. Contrast seen in both renal collecting systems with a cyst identified on the kidney bilaterally. The pancreas is homogeneous.      Atelectatic changes seen in the lung bases bilaterally. No evidence of parenchymal consolidation, infectious process or pulmonary nodule. No evidence of pulmonary embolism.  D:  07/05/2021 E:  07/05/2021    This report was finalized on 7/5/2021 2:16 PM by Dr. Mya Borja MD.      MRI Brain With & Without Contrast    Result Date: 7/5/2021  MRI Brain WO W INDICATION: Altered state, nausea and vomiting, generalized weakness, history of seizures, unable to ambulate TECHNIQUE: MRI of the brain with and without IV contrast. A total of 18 cc MultiHance IV contrast was administered. COMPARISON:  CT head and CTA head and neck 7/5/2021 and MRI brain 3/16/2021 FINDINGS: Left temporal lobe somewhat appears expansile with FLAIR signal hyperintensity within the anterior temporal lobe, amygdala, medial temporal lobe, and extending into the left insular region. On the postcontrast acquisitions there is marked motion degradation which limits sensitivity for detecting enhancing intracranial lesion. No definite enhancement is identified. The diffusion-weighted imaging demonstrates no evidence of acute or early subacute infarct. No intracranial hemorrhage. There are scattered T2/FLAIR signal hyperintensities within the bilateral cerebral and deep white matter which are nonspecific but most commonly associated with chronic microvascular ischemic change. No hydrocephalus. Trace fluid in the mastoid air cells.     1.  Allowing for motion degradation, no acute intracranial abnormality or significant interval change. Specifically, there is no evidence of acute or early subacute infarct. 2.  Expansile appearance of the left medial temporal lobe extending into the left insula is unchanged, reportedly consistent with patient's history of low-grade  glioma. Signer Name: ZACKERY JOHN MD  Signed: 7/5/2021 7:05 AM  Workstation Name: [facility]  [facility]    XR Chest 1 View    Result Date: 7/7/2021  EXAMINATION: XR CHEST 1 VW-  INDICATION: Concern for aspiration; R41.82-Altered mental status, unspecified; R11.2-Nausea with vomiting, unspecified; I63.9-Cerebral infarction, unspecified; I16.1-Hypertensive emergency; Z87.898-Personal history of other specified conditions; R13.10-Dysphagia, unspecified; R41.841-Cognitive communication deficit; R47.1-Dysarthria and anarthria.   COMPARISON: 07/05/2021.  FINDINGS: Portable chest reveals heart to be borderline enlarged. Very minimal increased markings are identified at the right lung base in which early infiltrate cannot be completely excluded. Continued followup is recommended. Degenerative change is seen within the spine. No definite pleural effusion.         Slight increase seen in the markings at the right lung base in which early infiltrate cannot be excluded. Continued followup is recommended as indicated.  D:  07/05/2021 E:  07/06/2021  This report was finalized on 7/7/2021 8:37 AM by Dr. Mya Borja MD.      XR Chest 1 View    Result Date: 7/5/2021  CHEST X-RAY, 7/5/2021  HISTORY:  81-year-old male in the ED undergoing stroke protocol assessment.  TECHNIQUE: AP portable chest x-ray.  FINDINGS: No definite active disease in the chest. No visible pulmonary infiltrate, pulmonary edema or pleural effusion. Moderate cardiomegaly. Tortuous thoracic aorta. Spinal curvature.     No active disease. Signer Name: Dain Kirk MD  Signed: 7/5/2021 5:13 AM  Workstation [facility]: [email or web address]  [facility]    CT Head Without Contrast Stroke Protocol    Result Date: 7/5/2021  CT HEAD, NONCONTRAST, 7/5/2021 HISTORY: 81-year-old male in the ED undergoing acute stroke evaluation. New onset decreased responsiveness. 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 TECHNIQUE: CT imaging of the head without IV contrast. Radiation dose reduction techniques included automated exposure control. Radiation audit for CT and nuclear cardiology exams in the last 12 months: 0. COMPARISON: *  MRI brain, 3/16/2021 and 1/9/2020. FINDINGS: Low-attenuation changes within the medial left temporal lobe extending to the left insula corresponding to the infiltrative mass best seen on prior MR imaging. This is grossly unchanged. No clearly acute intracranial abnormality. Mild generalized age-appropriate cerebral volume loss. Moderate diffuse chronic small vessel type white matter changes. Old lacunar infarct involving the periventricular body of the right caudate nucleus. These findings are unchanged. No evidence of acute intracranial hemorrhage. No increasing mass effect or cerebral edema. No extra-axial fluid collection or increasing ventricular enlargement.     1.  No clearly acute intracranial abnormality. 2.  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 3.  Stable diffuse chronic changes as noted above. NOTIFICATION: Critical Value/emergent results were relayed from me to the ED treatment team by telephone through the CT technologist, [place], at 04:30 on 7/5/2021. Signer Name: Dain Kirk MD  Signed: 7/5/2021 4:35 AM  Workstation Name: [email or web address]-  [facility]    XR Abdomen KUB    Result Date: 7/8/2021  EXAMINATION: XR ABDOMEN/KUB-07/06/2021:  INDICATION: Ileus; R41.82-Altered mental status, unspecified; R11.2-Nausea with vomiting, unspecified; I63.9-Cerebral infarction, unspecified; I16.1-Hypertensive emergency; Z87.898-Personal history of other specified conditions; R13.10-Dysphagia, unspecified; R41.841-Cognitive communication deficit; R47.1-Dysarthria and anarthria.    COMPARISON: 07/06/2021.  FINDINGS: Nasogastric tube in place. Nonspecific, nonobstructive bowel gas pattern with moderate colonic stool burden extending to the rectosigmoid colon. Chen catheter in situ. Degenerative changes of the scoliotic spine and pelvis.      Nonspecific, nonobstructive bowel gas pattern with decreased small bowel distention from prior comparison, however, persistent colonic stool burden concerning for constipation components without obstructive pattern.  D:  07/06/2021 E:  07/06/2021  This report was finalized on 7/8/2021 7:03 PM by Dr. Marc Almanza.      XR Abdomen KUB    Result Date: 7/6/2021  ABDOMEN, 7/6/2021 (02:36)  HISTORY:  NG tube placement.  TECHNIQUE: AP portable radiograph of the upper abdomen.  FINDINGS: NG tube tip is in the midportion of the stomach. The stomach is decompressed. Signer Name: Dain Kirk MD  Signed: 7/6/2021 3:28 AM  Workstation Name: [email or web address]-  [facility] [place]    CT Angiogram Head w AI Analysis of LVO    Result Date: 7/5/2021  CTA HEAD AND NECK WITH AI ANALYSIS FOR LVO, 7/5/2021 HISTORY: 81-year-old male in the ED with new onset decreased responsiveness. Right side facial droop, difficulty with speech. 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 TECHNIQUE: CT angiogram of the head and neck with contrast. CTA images were reformatted with 3-D postprocessing. Evaluation for a significant carotid arterial stenosis is based on NASCET criteria. Radiation dose reduction techniques included automated exposure control. Radiation audit for known CT and nuclear cardiology exams in the last 12 months: 0. AI analysis for LVO was utilized. COMPARISON: CT brain and CT perfusion exams tonight. CTA NECK: Normal aortic arch with no proximal great vessel stenosis. The vertebral arteries are widely patent with right vertebral dominance. Cervical carotid bifurcations are widely  patent with 0% stenosis in both internal carotid arteries by NASCET criteria. CTA HEAD: Intracranially, there is symmetric distal vascular contrast distribution in the anterior, middle and posterior cerebral artery territories. Fetal type supply of both posterior cerebral arteries. Moderately severe focal atherosclerotic stenosis of the right P1 PCA segment. No additional evidence of intracranial arterial flow limiting stenosis. No large vessel occlusion. No intracranial aneurysm or vascular malformation is identified. Dural venous sinuses appear normal. No abnormal enhancement involving the infiltrative left temporal lobe tumor.     1.  No significant extracranial carotid or vertebral artery stenosis. 2.  Moderately severe focal stenosis of the right P1 PCA segment, likely atherosclerotic. Bilateral fetal-type PCom supply both posterior cerebral arteries. No additional evidence of intracranial flow limiting stenosis or large vessel occlusion. Signer Name: Dain Kirk MD  Signed: 7/5/2021 5:12 AM  Workstation Name: [email or web address]  [facility]    CT CEREBRAL PERFUSION WITH & WITHOUT CONTRAST    Result Date: 7/5/2021  CT CEREBRAL PERFUSION, WITH AND WITHOUT CONTRAST, 7/5/2021 HISTORY: 81-year-old male in the ED undergoing acute stroke evaluation. New onset decreased responsiveness. Right side facial droop, difficulty with speech. 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 TECHNIQUE: Axial CT images of the brain without and with intravenous contrast using cerebral perfusion protocol. Post-processing parametric maps were created using RAPID software and reviewed. Radiation dose reduction techniques included automated exposure control or exposure modulation based on body size. CT and nuclear cardiology exams in the last 12 months: 0. COMPARISON: CT head, tonight. MRI brain, 3/16/2021 and 1/9/2020. FINDINGS: Arterial  input function is optimal. There is no convincing evidence of cerebral ischemia or infarction. There is some flow asymmetry in the region of the low medial left temporal lobe and insular region, particularly on the Tmax map, that is likely attributable to known infiltrative tumor in this region. This region is also identified as abnormal on the CT attenuation hypodensity map.     1.  No convincing evidence of acute cerebral ischemia. 2.  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 3.  Consider repeat MR imaging of the brain. Signer Name: Dain Kirk MD  Signed: 7/5/2021 5:00 AM  Workstation [facility]: [facility]  [facility]              Results for orders placed during the hospital encounter of 07/05/21    Adult Transthoracic Echo Complete W/ Cont if Necessary Per Protocol    Interpretation Summary  · Estimated left ventricular EF = 65%  · The cardiac valves are anatomically and functionally normal.      Plan for Follow-up of Pending Labs/Results:   Pending Labs     Order Current Status    Blood Culture - Blood, Hand, Right Preliminary result    Blood Culture - Blood, Hand, Right Preliminary result        Discharge Details        Discharge Medications      ASK your doctor about these medications      Instructions Start Date   Flonase 50 MCG/ACT nasal spray  Generic drug: fluticasone   Nasal      latanoprost 0.005 % ophthalmic solution  Commonly known as: XALATAN   No dose, route, or frequency recorded.      levETIRAcetam 500 MG tablet  Commonly known as: KEPPRA   250 mg, Oral, Every 12 Hours Scheduled      lisinopril 20 MG tablet  Commonly known as: PRINIVIL,ZESTRIL   Oral      SYSTANE OP   Systane (propylene glycol)      timolol 0.25 % ophthalmic solution  Commonly known as: TIMOPTIC   1 drop, 2 Times Daily             Allergies   Allergen Reactions   • Sulfa Antibiotics Hives      RASH         Discharge Disposition:  Hospice/Medical Facility ([facility] [place])    Diet:  Hospital:No active diet order      Activity:      Restrictions or Other Recommendations:         CODE STATUS:    Code Status and Medical Interventions:   Ordered at: 07/07/21 1740     Limited Support to NOT Include:    Cardioversion/Defibrillation    Intubation    Vasopressors    Dialysis     Level Of Support Discussed With:    Next of Kin (If No Surrogate)     Code Status:    No CPR     Medical Interventions (Level of Support Prior to Arrest):    Limited       Future Appointments   Date Time Provider Department Center   10/19/2021  9:00 AM Cesar Henning MD [facility]                 Justina Lujan MD  07/09/21      Time Spent on Discharge:  I spent  35 minutes on this discharge activity which included: face-to-face encounter with the patient, reviewing the data in the system, coordination of the care with the nursing staff as well as consultants, documentation, and entering orders.

## 2021-07-09 NOTE — PROGRESS NOTES
"Patient Name:  Jonatan Ocampo  YOB: 1940  [id number]    Surgery Progress Note    Date of visit: 7/9/2021    Subjective   Subjective: Remains obtunded.          Objective     Objective:     /81 (BP Location: Left arm, Patient Position: Lying)   Pulse 113   Temp 99.2 °F (37.3 °C) (Axillary)   Resp 28   Ht 175.3 cm (69.02\")   Wt 90.2 kg (198 lb 12.8 oz)   SpO2 (!) 88%   BMI 29.34 kg/m²     Intake/Output Summary (Last 24 hours) at 7/9/2021 0712  Last data filed at 7/9/2021 0600  Gross per 24 hour   Intake 2563.74 ml   Output 2650 ml   Net -86.26 ml       CV:  Rhythm  regular and rate regular   L:  Rhonchi to auscultation bilaterally,   Abd:  Bowel sounds positive , soft, nontender  Ext:  No cyanosis, clubbing, edema    Recent labs that are back at this time have been reviewed.  White blood count rising to 17,000.  Hemoglobin 13.8.       Assessment/Plan     Assessment/ Plan:    Problem List Items Addressed This Visit        Neuro    Altered mental status - Primary-  Despite EEG showing control of seizures, patient remains obtunded and his mental status is not improved.  Furthermore, his white count continues to increase, and he is not making progress from a respiratory standpoint.  I had a lengthy discussion with the patient's son Miguel Angel last evening, explaining that what ever GI issues he is having, they most likely are ultimately resulting from his overall poor status and are not the  of this decline.  I would recommend a transition to palliative care only.  I have written for suppositories to help continue bowel function, but would not recommend any surgical intervention under any circumstances, as I feel this would be futile care.  I will sign off, please call with any questions you may have.        Other Visit Diagnoses     Non-intractable vomiting with nausea, unspecified vomiting type        Cerebrovascular accident (CVA), unspecified mechanism (CMS/HCC)        Hypertensive " emergency        History of seizure        Dysphagia, unspecified type        Cognitive communication deficit        Dysarthria               Active Hospital Problems    Diagnosis  POA   • **Suspected cerebrovascular accident (CVA) [R09.89]  Yes   • Hypertensive urgency [I16.0]  Yes   • Acute respiratory failure with hypoxia (CMS/HCC) [J96.01]  Yes   • Altered mental status [R41.82]  Yes   • Hypertension [I10]  Yes   • Hyperlipidemia [E78.5]  Yes   • 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              Tyrell Osborne MD  7/9/2021  07:12 EDT

## 2021-07-09 NOTE — PROGRESS NOTES
Clinical Nutrition       Reason for Visit:   Identified at risk by screening criteria      Patient Name: Jonatan Ocampo  YOB: 1940  MRN: [id number]  Date of Encounter: 07/09/21 12:41 EDT  Admission date: 7/5/2021    Nutrition Assessment   Assessment   Seizures  L. temporal glioma  AMS  Vomiting  Ileus  Aspiration PNA  ARF/hypoxia  GI  Hypernatremia    PMH: He  has a past medical history of Abnormal MRI of head, Generalized convulsive seizure (CMS/HCC), Glaucoma, Hyperlipidemia, Hypertension, and Seizures (CMS/HCC).GLioma   PSxH: He  has a past surgical history that includes No past surgeries.       Reported/Observed/Food/Nutrition Related History:     Pt resting in bed, on nasal cannula, family at bedside    Per RN: pt is less responsive than when first admitted, decision made to transition to comfort measures, Hospice consulted      Anthropometrics     Height: 69in  Last filed wt: 191lb bedscale  BMI: 28  Overweight: 25.0-29.9kg/m2             Last 15 Recorded Weights  View Complete Flowsheet  Weight Weight (kg) Weight (lbs) Weight Method VISIT REPORT   7/7/2021 86.955 kg 191 lb 11.2 oz Bed scale -   7/6/2021 89.359 kg 197 lb - -   7/6/2021 89.449 kg 197 lb 3.2 oz Bed scale -   7/5/2021 87.091 kg 192 lb Stated -   10/1/2020 90.719 kg 200 lb - Report   11/6/2018 88.451 kg 195 lb - Report   11/14/2017 88.451 kg 195 lb - Report   6/1/2017 90.719 kg 200 lb - Report   12/1/2016 90.719 kg 200 lb - Report   6/10/2016 93.895 kg 207 lb - Report   5/31/2016 90.719 kg 200 lb - Report   3/8/2016 90.72 kg 200 lb - -         Labs reviewed     Results from last 7 days   Lab Units 07/09/21  0603 07/08/21  2040 07/08/21  0656 07/07/21  0909 07/06/21  0529 07/05/21  2149 07/05/21  0431   GLUCOSE mg/dL 141*  --  161* 160* 144* 176* 178*   BUN mg/dL 37*  --  52* 50* 33* 28* 12   CREATININE mg/dL 1.13  --  1.68* 1.95* 2.34* 2.29* 0.97   SODIUM mmol/L 149*  --  153* 139 140 139 140   CHLORIDE mmol/L  111*  --  108* 101 105 103 106   POTASSIUM mmol/L 3.2* 3.2* 3.4* 4.0 4.2 4.6 3.9   MAGNESIUM mg/dL  --  2.2  --   --   --  2.0  --    ALT (SGPT) U/L  --   --   --  18 19  --  14     Results from last 7 days   Lab Units 07/07/21  0909 07/06/21  0529 07/05/21  0431   ALBUMIN g/dL 3.00* 3.40* 3.90       Results from last 7 days   Lab Units 07/09/21  0730 07/08/21  1942 07/08/21  1626 07/08/21  1055 07/08/21  0715 07/07/21  1945   GLUCOSE mg/dL 122 165* 140* 147* 140* 159*     Lab Results   Lab Value Date/Time    HGBA1C 5.3 02/25/2016 0510         Medications reviewed   Pertinent:abx, dulcolax, colace, relistor, reglan, heparin, vimpat, keppra, cardene, 1/2NS@100ml/hr      Intake/Ouptut 24 hrs (7:00AM - 6:59 AM)     Intake & Output (last day)       07/08 0701 - 07/09 0700 07/09 0701 - 07/10 0700    I.V. (mL/kg) 1813.7 (20.1)     NG/     IV Piggyback 650     Total Intake(mL/kg) 2563.7 (28.4)     Urine (mL/kg/hr) 2000 (0.9)     Emesis/NG output 650     Total Output 2650     Net -86.3                 Nutrition Focused Physical Exam Findings      GI: ngt= 650ml    SKIN:bruised      Needs Assessment 7-7-21       Height used 69in   Weight used 191lb         Estimated need Method/Equation used Result    Energy/Calorie need  kcals/d 20kcal per kg 1736kcal    MSJ x 1.2 1871kcal     goal ~1700kcal   Protein g/day .8-1.2g protein per kg 69-104g protein     goal ~70g                            Current Nutrition Prescription     PO: NPO Diet  No active supplement orders      Nutrition Diagnosis     7-7-21, 7-9  Problem Altered GI function   Etiology Per Clinical Status: suspected ileus s/p seizure   Signs/Symptoms vomiting, high ngt output     Problem Inadequate energy intake   Etiology Per Clinical Status   Signs/Symptoms NPO       Nutrition Intervention   1.  Follow treatment progress    Plan to transition to Comfort Measures    RD to sign off    Goal:   General: Nutrition support treatment, Palliative care, Hospice  care    Monitoring/Evaluation:   Per protocol, I&O, Pertinent labs, Weight, Skin status, GI status, Symptoms, Swallow function    Yanely Mancilla RD, CNSC  Time Spent: 30min

## 2021-07-09 NOTE — DISCHARGE PLACEMENT REQUEST
"Fausto Ocamposudeep BENAVIDEZ (81 y.o. Male)     Date of Birth Social Security Number Address Home Phone MRN    1940  [id number] [facility] [phone] [id number]    Rastafarian Marital Status          Religion        Admission Date Admission Type Admitting Provider Attending Provider Department, Room/Bed    7/5/21 Emergency Justina Lujan MD Howard, Gabriela Kirk, MD [facility] 6B, N627/1    Discharge Date Discharge Disposition Discharge Destination                       Attending Provider: Justina Lujan MD    Allergies: Sulfa Antibiotics    Isolation: None   Infection: None   Code Status: No CPR    Ht: 175.3 cm (69.02\")   Wt: 90.2 kg (198 lb 12.8 oz)    Admission Cmt: None   Principal Problem: Suspected cerebrovascular accident (CVA) [R09.89]                 Active Insurance as of 7/5/2021     Primary Coverage     Payor Plan Insurance Group Employer/Plan Group    MEDICARE MEDICARE A & B      Payor Plan Address Payor Plan Phone Number Payor Plan Fax Number Effective Dates    [address] [phone]  4/1/2004 - None Entered    [facility] [id number]       Subscriber Name Subscriber Birth Date Member ID       PRIMO OCAMPO 1940 [id number]           Secondary Coverage     Payor Plan Insurance Group Employer/Plan Group     FOR LIFE  FOR LIFE  SUP       Payor Plan Address Payor Plan Phone Number Payor Plan Fax Number Effective Dates    [address] [phone]  5/26/2016 - None Entered    [facility] [address]       Subscriber Name Subscriber Birth Date Member ID       PRIMO OCAMPO [id number]                 Emergency Contacts      (Rel.) Home Phone Work Phone Mobile Phone    Roxy Ocampo (Spouse) [phone] -- [phone]    Miguel Angel Ocampo (Son) -- -- [phone]    Terrence Dain (Brother) [phone] -- --            Emergency Contact Information     Name Relation Home Work Mobile    Roxy Ocampo Spouse " [phone]    Miguel Angel Ocampo Son   [phone]    Dain Ocampo Brother [phone]            Insurance Information                MEDICARE/MEDICARE A & B Phone: [phone]    Subscriber: Jonatan Ocampo Subscriber#: [id number]    Group#:  Precert#:          FOR LIFE/ FOR LIFE [facility] SUP  Phone: [phone]    Subscriber: Jonatan Ocampo Subscriber#: [id number]    Group#:  Precert#:           Problem List         Codes Noted - Resolved       Hospital    * (Principal) Suspected cerebrovascular accident (CVA) ICD-10-CM: R09.89  ICD-9-CM: 785.9 2021 - Present    Hypertensive urgency ICD-10-CM: I16.0  ICD-9-CM: 401.9 2021 - Present    Acute respiratory failure with hypoxia (CMS/HCC) ICD-10-CM: J96.01  ICD-9-CM: 518.81 2021 - Present    Altered mental status ICD-10-CM: R41.82  ICD-9-CM: 780.97 2021 - Present    Hyperlipidemia ICD-10-CM: E78.5  ICD-9-CM: 272.4 2016 - Present    Hypertension ICD-10-CM: I10  ICD-9-CM: 401.9 2016 - Present    Generalized convulsive seizures (CMS/HCC) ICD-10-CM: R56.9  ICD-9-CM: 780.39 2016 - Present       Non-Hospital    Chronic fatigue ICD-10-CM: R53.82  ICD-9-CM: 780.79 4/15/2021 - Present    Mass of temporal lobe ICD-10-CM: G93.89  ICD-9-CM: 348.89 2016 - Present             History & Physical      Anna Stanford MD at [address] [place] [address] Medicine Services  HISTORY AND PHYSICAL    Patient Name: Jonatan Ocampo  : 1940  MRN: [id number]  Primary Care Physician: Floyd Heard MD  Date of admission: 2021      Subjective   Subjective     Chief Complaint:  Stroke like symptoms     HPI:    Jonatan Ocampo is an 81yoM with h/o left temporal mass, seizures, HTN, HLD who presented to the [facility] ED 7/5 AM as a code stroke, with altered mental status, right sided weakness, dysarthria and aphasia. Upon my evaluation, patient has required restraints due to attempting to  get out of bed numerous times, pulling at lines etc.. no family is present during my assessment and patient is unable to participate in history. Per ED note- Patient also noted to have vomiting and hypoxia in the ED- requiring 2-3 L on no home baseline.     Was evaluated by the stroke team in the ED, CT imaging with left temporal lobe mass c/w prior MRI, MRI imaging did not reveal acute CVA. Labs otherwise notable for D-dimer of 9.5, ABG with pO2 76 and glucose 178, otherwise blood work is normal. Patient currently admitted for ongoing stroke w/u and w/u of altered mental status.         COVID Details:    Symptoms:    [x] NONE [] Fever []  Cough [] Shortness of breath [] Change in taste/smell      The patient qualifies to receive the vaccine, but they have not yet received it.      Review of Systems   Unable to assess given mentation  All other systems reviewed and are negative.     Personal History     Past Medical History:   Diagnosis Date   • Abnormal MRI of head    • Generalized convulsive seizure (CMS/HCC)    • Glaucoma    • Hyperlipidemia    • Hypertension    • Seizures (CMS/HCC)        Past Surgical History:   Procedure Laterality Date   • NO PAST SURGERIES         Family History family history includes Alzheimer's disease in his mother; Cancer in his mother; Dementia in his mother; Depression in his mother. Otherwise pertinent FHx was reviewed and unremarkable.     Social History:  reports that he has never smoked. He has never used smokeless tobacco. He reports that he does not drink alcohol and does not use drugs.  Social History     Social History Narrative   • Not on file       Medications:  Available home medication information reviewed.  Medications Prior to Admission   Medication Sig Dispense Refill Last Dose   • timolol (TIMOPTIC) 0.25 % ophthalmic solution 1 drop 2 (Two) Times a Day.      • fluticasone (FLONASE) 50 MCG/ACT nasal spray into each nostril.      • latanoprost (XALATAN) 0.005 %  ophthalmic solution       • levETIRAcetam (KEPPRA) 500 MG tablet Take 0.5 tablets by mouth Every 12 (Twelve) Hours. 180 tablet 5    • lisinopril (PRINIVIL,ZESTRIL) 20 MG tablet Take  by mouth.      • Polyethyl Glycol-Propyl Glycol (SYSTANE OP) Systane (propylene glycol)          Allergies   Allergen Reactions   • Sulfa Antibiotics Hives     RASH       Objective   Objective     Vital Signs:   Temp:  [98.3 °F (36.8 °C)-98.6 °F (37 °C)] 98.3 °F (36.8 °C)  Heart Rate:  [] 108  Resp:  [16] 16  BP: (155-243)/() 157/84  Flow (L/min):  [3-4] 3  Total (NIH Stroke Scale): 11    Physical Exam   GEN- resting in bed, in soft restraints , appears comfortable  HEENT- atraumatic, speech very difficult to understand but is alert   NECK- supple, trachea midline, no masses  RESP: slightly diminished effort but no wheezing, on 3L NC   CV: no murmurs, s1/s2, rrr  MSK: no edema noted, spontaneous movement of all extremities  NEURO: alert but unable to participate fully in neuro exam, moving all extremities  SKIN: no rashes  PSYCH: uto      Result Review:  I have personally reviewed the results from the time of this admission to 7/5/2021 11:39 EDT and agree with these findings:  [x]  Laboratory  []  Microbiology  [x]  Radiology  [x]  EKG/Telemetry   []  Cardiology/Vascular   []  Pathology  [x]  Old records  []  Other:  Most notable findings include:     Imaging findings as above      LAB RESULTS:      Lab 07/05/21  0702 07/05/21  0431 07/05/21  0427   WBC  --  8.13  --    HEMOGLOBIN  --  14.0  --    HEMOGLOBIN, POC  --   --  13.9   HEMATOCRIT  --  41.1  --    HEMATOCRIT POC  --   --  41   PLATELETS  --  223  --    NEUTROS ABS  --  5.58  --    IMMATURE GRANS (ABS)  --  0.04  --    LYMPHS ABS  --  1.88  --    MONOS ABS  --  0.48  --    EOS ABS  --  0.13  --    MCV  --  91.5  --    PROCALCITONIN  --  0.08  --    D DIMER QUANT 9.53*  --   --          Lab 07/05/21  0431 07/05/21  0427   SODIUM 140  --    POTASSIUM 3.9  --     CHLORIDE 106  --    CO2 22.0  --    ANION GAP 12.0  --    BUN 12  --    CREATININE 0.97 1.00   GLUCOSE 178*  --    CALCIUM 8.7  --    TSH 3.950  --          Lab 07/05/21  0431   TOTAL PROTEIN 7.2   ALBUMIN 3.90   GLOBULIN 3.3   ALT (SGPT) 14   AST (SGOT) 22   BILIRUBIN 0.6   ALK PHOS 61         Lab 07/05/21  0431   PROBNP 360.1   TROPONIN T <0.010                 Lab 07/05/21  0427   PH, ARTERIAL 7.43         Microbiology Results (last 10 days)     ** No results found for the last 240 hours. **          CT Angiogram Neck    Result Date: 7/5/2021  CTA HEAD AND NECK WITH AI ANALYSIS FOR LVO, 7/5/2021 HISTORY: 81-year-old male in the ED with new onset decreased responsiveness. Right side facial droop, difficulty with speech. 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 TECHNIQUE: CT angiogram of the head and neck with contrast. CTA images were reformatted with 3-D postprocessing. Evaluation for a significant carotid arterial stenosis is based on NASCET criteria. Radiation dose reduction techniques included automated exposure control. Radiation audit for known CT and nuclear cardiology exams in the last 12 months: 0. AI analysis for LVO was utilized. COMPARISON: CT brain and CT perfusion exams tonight. CTA NECK: Normal aortic arch with no proximal great vessel stenosis. The vertebral arteries are widely patent with right vertebral dominance. Cervical carotid bifurcations are widely patent with 0% stenosis in both internal carotid arteries by NASCET criteria. CTA HEAD: Intracranially, there is symmetric distal vascular contrast distribution in the anterior, middle and posterior cerebral artery territories. Fetal type supply of both posterior cerebral arteries. Moderately severe focal atherosclerotic stenosis of the right P1 PCA segment. No additional evidence of intracranial arterial flow limiting stenosis. No large vessel occlusion. No  intracranial aneurysm or vascular malformation is identified. Dural venous sinuses appear normal. No abnormal enhancement involving the infiltrative left temporal lobe tumor.     Impression: 1.  No significant extracranial carotid or vertebral artery stenosis. 2.  Moderately severe focal stenosis of the right P1 PCA segment, likely atherosclerotic. Bilateral fetal-type PCom supply both posterior cerebral arteries. No additional evidence of intracranial flow limiting stenosis or large vessel occlusion. Signer Name: Dain Kirk MD  Signed: 7/5/2021 5:12 AM  Workstation Name: [email or web address]  [facility]    MRI Brain With & Without Contrast    Result Date: 7/5/2021  MRI Brain WO W INDICATION: Altered state, nausea and vomiting, generalized weakness, history of seizures, unable to ambulate TECHNIQUE: MRI of the brain with and without IV contrast. A total of 18 cc MultiHance IV contrast was administered. COMPARISON:  CT head and CTA head and neck 7/5/2021 and MRI brain 3/16/2021 FINDINGS: Left temporal lobe somewhat appears expansile with FLAIR signal hyperintensity within the anterior temporal lobe, amygdala, medial temporal lobe, and extending into the left insular region. On the postcontrast acquisitions there is marked motion degradation which limits sensitivity for detecting enhancing intracranial lesion. No definite enhancement is identified. The diffusion-weighted imaging demonstrates no evidence of acute or early subacute infarct. No intracranial hemorrhage. There are scattered T2/FLAIR signal hyperintensities within the bilateral cerebral and deep white matter which are nonspecific but most commonly associated with chronic microvascular ischemic change. No hydrocephalus. Trace fluid in the mastoid air cells.     Impression: 1.  Allowing for motion degradation, no acute intracranial abnormality or significant interval change. Specifically, there is no evidence of acute or early  subacute infarct. 2.  Expansile appearance of the left medial temporal lobe extending into the left insula is unchanged, reportedly consistent with patient's history of low-grade glioma. Signer Name: ZACKERY JOHN MD  Signed: 7/5/2021 7:05 AM  Workstation Name: [facility]  [facility]    XR Chest 1 View    Result Date: 7/5/2021  CHEST X-RAY, 7/5/2021  HISTORY:  81-year-old male in the ED undergoing stroke protocol assessment.  TECHNIQUE: AP portable chest x-ray.  FINDINGS: No definite active disease in the chest. No visible pulmonary infiltrate, pulmonary edema or pleural effusion. Moderate cardiomegaly. Tortuous thoracic aorta. Spinal curvature.     Impression: No active disease. Signer Name: Dain Kirk MD  Signed: 7/5/2021 5:13 AM  Workstation Name: [facility]  [facility]    CT Head Without Contrast Stroke Protocol    Result Date: 7/5/2021  CT HEAD, NONCONTRAST, 7/5/2021 HISTORY: 81-year-old male in the ED undergoing acute stroke evaluation. New onset decreased responsiveness. 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 TECHNIQUE: CT imaging of the head without IV contrast. Radiation dose reduction techniques included automated exposure control. Radiation audit for CT and nuclear cardiology exams in the last 12 months: 0. COMPARISON: *  MRI brain, 3/16/2021 and 1/9/2020. FINDINGS: Low-attenuation changes within the medial left temporal lobe extending to the left insula corresponding to the infiltrative mass best seen on prior MR imaging. This is grossly unchanged. No clearly acute intracranial abnormality. Mild generalized age-appropriate cerebral volume loss. Moderate diffuse chronic small vessel type white matter changes. Old lacunar infarct involving the periventricular body of the right caudate nucleus. These findings are unchanged. No evidence of acute intracranial hemorrhage. No  increasing mass effect or cerebral edema. No extra-axial fluid collection or increasing ventricular enlargement.     Impression: 1.  No clearly acute intracranial abnormality. 2.  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 3.  Stable diffuse chronic changes as noted above. NOTIFICATION: Critical Value/emergent results were relayed from me to the ED treatment team by telephone through the CT technologist, [place], at 04:30 on 7/5/2021. Signer Name: Dain Kirk MD  Signed: 7/5/2021 4:35 AM  Workstation Name: [email or web address]  [facility]    CT Angiogram Head w AI Analysis of LVO    Result Date: 7/5/2021  CTA HEAD AND NECK WITH AI ANALYSIS FOR LVO, 7/5/2021 HISTORY: 81-year-old male in the ED with new onset decreased responsiveness. Right side facial droop, difficulty with speech. 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 TECHNIQUE: CT angiogram of the head and neck with contrast. CTA images were reformatted with 3-D postprocessing. Evaluation for a significant carotid arterial stenosis is based on NASCET criteria. Radiation dose reduction techniques included automated exposure control. Radiation audit for known CT and nuclear cardiology exams in the last 12 months: 0. AI analysis for LVO was utilized. COMPARISON: CT brain and CT perfusion exams tonight. CTA NECK: Normal aortic arch with no proximal great vessel stenosis. The vertebral arteries are widely patent with right vertebral dominance. Cervical carotid bifurcations are widely patent with 0% stenosis in both internal carotid arteries by NASCET criteria. CTA HEAD: Intracranially, there is symmetric distal vascular contrast distribution in the anterior, middle and posterior cerebral artery territories. Fetal type supply of both posterior cerebral  arteries. Moderately severe focal atherosclerotic stenosis of the right P1 PCA segment. No additional evidence of intracranial arterial flow limiting stenosis. No large vessel occlusion. No intracranial aneurysm or vascular malformation is identified. Dural venous sinuses appear normal. No abnormal enhancement involving the infiltrative left temporal lobe tumor.     Impression: 1.  No significant extracranial carotid or vertebral artery stenosis. 2.  Moderately severe focal stenosis of the right P1 PCA segment, likely atherosclerotic. Bilateral fetal-type PCom supply both posterior cerebral arteries. No additional evidence of intracranial flow limiting stenosis or large vessel occlusion. Signer Name: Dain Kirk MD  Signed: 7/5/2021 5:12 AM  Workstation Name: [email or web address]  [facility]    CT CEREBRAL PERFUSION WITH & WITHOUT CONTRAST    Result Date: 7/5/2021  CT CEREBRAL PERFUSION, WITH AND WITHOUT CONTRAST, 7/5/2021 HISTORY: 81-year-old male in the ED undergoing acute stroke evaluation. New onset decreased responsiveness. Right side facial droop, difficulty with speech. 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 TECHNIQUE: Axial CT images of the brain without and with intravenous contrast using cerebral perfusion protocol. Post-processing parametric maps were created using RAPID software and reviewed. Radiation dose reduction techniques included automated exposure control or exposure modulation based on body size. CT and nuclear cardiology exams in the last 12 months: 0. COMPARISON: CT head, tonight. MRI brain, 3/16/2021 and 1/9/2020. FINDINGS: Arterial input function is optimal. There is no convincing evidence of cerebral ischemia or infarction. There is some flow asymmetry in the region of the low medial left temporal lobe and insular region, particularly on the Tmax map, that is likely attributable to known  infiltrative tumor in this region. This region is also identified as abnormal on the CT attenuation hypodensity map.     Impression: 1.  No convincing evidence of acute cerebral ischemia. 2.  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 3.  Consider repeat MR imaging of the brain. Signer Name: Dain Kirk MD  Signed: 7/5/2021 5:00 AM  Workstation Name: [id number]-  [facility]          Assessment/Plan   Assessment & Plan     Active Hospital Problems    Diagnosis  POA   • **Suspected cerebrovascular accident (CVA) [R09.89]  Yes   • Hypertensive urgency [I16.0]  Yes   • Acute respiratory failure with hypoxia (CMS/HCC) [J96.01]  Yes   • Altered mental status [R41.82]  Yes   • Hypertension [I10]  Yes   • Hyperlipidemia [E78.5]  Yes   • Generalized convulsive seizures (CMS/HCC) [R56.9]  Yes       Jonatan Ocampo is an 81yoM with h/o left temporal mass, seizures, HTN, HLD who presented to the [facility] ED 7/5 AM as a code stroke, with altered mental status, right sided weakness, dysarthria and aphasia. Patient also noted to have vomiting and hypoxia in the ED- requiring 2-3 L on no home baseline. Was evaluated by the stroke team in the ED, CT imaging with left temporal lobe mass c/w prior MRI, MRI imaging did not reveal acute CVA. Patient currently admitted for ongoing stroke w/u and w/u of altered mental status.     Altered mental status/right extremity weakness  Seizure versus CVA   -Already loaded with Keppra 1 g, continue home dose.  -MRI negative for acute CVA as above  -EEG, seizure precautions, restraints as needed   --general neurology to see      Hypoxia-requiring 2 to 3 L of oxygen to maintain sats around 92%, with no known history of lung disease, denies any complaint of cough.  Elevated D-dimer   -?  Aspiration pneumonia-continue to monitor O2 supplementation.  --also concern of  possible PE given elevated D-dimer- will order CTPE protocol to r/o   --covid screen ordered and pending though low suspicion    Addendum- CTA negative, but patient noted to have worsening hypoxia/resp status following dinner (after which he was ordered modified diet with SLP). Nursing noted they were concern about aspiration and patient had episode of vomiting. CXR obtained with ? New infiltrate on right. Will start zosyn. Keep NPO. Watch oxygen status closely.      Glaucoma-on timolol eyedrops,-continue home drops     Hypertension-- uncontrolled  -lisinopril 20 mg daily-currently uncontrolled, on Cardene drip will continue, troponin less than 0.01, EKG  Sinus rhythm 75 bpm, , LVH criteria, no acute ST-T wave changes.     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  Patient follows up with Dr. Hurt. Continue Keppra as above     Hyperlipidemia-not on statin    DVT prophylaxis:  Add lovenox as MRI negative for acute CVA-- pending CTPE above     Attempted to call spouse, Roxy Ocampo at both numbers listed at 1234pm, no answer    D/w spouse Roxy at bedside around 2pm. She reports patient was in normal state of health until last evening after going to a BBQ while eating hot dog, potato salad. Later on reports vomiting and confusion. Ultimately confusion and inability to communicate with him brought him to ED      CODE STATUS:  full  Code Status and Medical Interventions:   Ordered at: 07/05/21 0604     Level Of Support Discussed With:    Patient     Code Status:    CPR     Medical Interventions (Level of Support Prior to Arrest):    Full       Admission Status:  I believe this patient meets INPATIENT status due to workup as above/altered mentation.  I feel patient’s risk for adverse outcomes and need for care warrant INPATIENT evaluation and I predict the patient’s care encounter to likely  last beyond 2 midnights.      Anna Stanford MD  07/05/21      Electronically signed by Anna Stanford MD at 07/05/21 9751

## 2021-07-09 NOTE — PLAN OF CARE
Goal Outcome Evaluation:           Progress: declining    Problem: Adult Inpatient Plan of Care  Goal: Plan of Care Review  7/9/2021 2563 by Nickolas Villalobos RN  Outcome: Ongoing, Not Progressing  Flowsheets (Taken 7/9/2021 0740)  Outcome Summary: Patient remains only responsive to painful stimuli. Cardene gtt continued to maintain SBP >180. Patient remained tachypneic, addressed with PRN morphine. Fever of 100.3 addressed with PRN acetaminophen suppository with relief. At begining of shift, patient was in NSR and progressed to sinus tach during shift. Potassium replaced. Continue POC.

## 2021-07-09 NOTE — PROGRESS NOTES
"Neurology       Patient Care Team:  Floyd Heard MD as PCP - General  Floyd Heard MD as PCP - Family Medicine  Cesar Henning MD as Consulting Physician (Neurology)    Chief complaint breakthrough seizures, AMS    Subjective .     History:    Patient continues to deteriorate.  Fevers persist -septic.  His white counts increasing.  He is unresponsive.  Family at bedside.      Objective     Vital Signs   Blood pressure 152/81, pulse 113, temperature 99.2 °F (37.3 °C), temperature source Axillary, resp. rate 28, height 175.3 cm (69.02\"), weight 90.2 kg (198 lb 12.8 oz), SpO2 (!) 88 %.    Physical Exam:  Obtunded adult male.  He is nonresponsive.  Not following commands.  Labored breathing, tachypneic.  Restrained at the wrist.  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 We've titrated up on Keppra and scheduled Vimpat. It may take several days for him to return to his baseline given his prolonged seizure activity and sensitivity to AEDs.     7/7     No further seizures on EEG.  He responded well to increase in medication in addition to Vimpat.     However exam has not improved and had a long conversation with son and wife at bedside.     Explained that it is going to take considerable time to see improvement, however cautioned that he may not return to baseline.  The concern when someone is this encephalopathic is that they will continue to become worse, aspirates and develop infections.  He also is at risk for developing hospital-acquired delirium which will dramatically hinder his ability to recover.  Another concern is that he did not tolerate Keppra because it made him too somnolent and we had to go up on this medication to prevent seizure.     We discussed that family " should consider making patient DNR.  If he were to have a cardiac arrest, this would most likely confirm a poor prognosis and would only prolong suffering.  Family is still trying to decide.     We will repeat EEG tomorrow.     7/8     Repeat EEG showing no further seizure however still significant dysfunction on the left side of his brain where he has a known low-grade glioma.  Mental status is worsening.       Long conversation with his wife at bedside today.  She tells me that she is ready to let him go and does not want to see him to continue to suffer, Berrios her son is struggling with transitioning to comfort care.     We discussed that every day the patient does not awake.  Prognosis for a full and quick recovery is reduced.  Reiterated that he would have a prolonged hospitalization and rehab without guarantee of returning to his former baseline.  Patient was very independent and wife tells me he would not want to be in a hospital for a long period of time.     Continue goals of care discussion with family and son.    7/9     Patient continues to deteriorate.  Had a discussion with family that we could pursue lumbar puncture for infectious work-up.  Explained that this will have to be done at bedside as he is unstable to take down unlabored prone.  Patient's son and wife tell me that they both agree that patient is suffering and that they wish to transition to hospice.  Agree with this plan given his very low likelihood of significant neurological recovery without aggressive treatment patient would not want.    Per family, in accordance with patient's known wishes, patient will be transition to hospice rather than continue aggressive medical treatment.    I discussed the patients findings and my recommendations with patient's family, primary team    Jeanette Pope MD  07/09/21  08:02 EDT

## 2021-07-09 NOTE — PROGRESS NOTES
[facility] Medicine Services  PROGRESS NOTE    Patient Name: Jonatan Ocampo  : 1940  MRN: [id number]    Date of Admission: 2021  Primary Care Physician: Floyd Heard MD    Subjective   Subjective     CC:  Seizures/AMS    HPI:  Pt febrile overnight and tachycardic this am.  Given Tylenol suppositories. Long discussion with his family again this am- wife was still open to comfort care but son was not yet there. By the time they talked to Neurology, they were on board with transition to comfort care    ROS:  uto    Objective   Objective     Vital Signs:   Temp:  [98.5 °F (36.9 °C)-100.3 °F (37.9 °C)] 99 °F (37.2 °C)  Heart Rate:  [] 113  Resp:  [20-38] 28  BP: (139-169)/(69-88) 152/81  Total (NIH Stroke Scale): 9     Physical Exam:  GEN- resting in bed, nonverbal, appears ill  HEENT- atraumatic, normocephalic, eomi, NGT in place with bilious output present  NECK- supple, trachea midline, no masses  RESP: on 6LNC, but breathing out of his mouth (so on RA), rhonchi b/l  CV: no murmurs, s1/s2, tachycardic  MSK: no edema noted, spontaneous movement of all extremities  NEURO: unable to understand speech and mostly nonverbal, does not follow commands, does not attempt to open eyes  SKIN: no rashes  PSYCH: uto      Results Reviewed:  Results from last 7 days   Lab Units 21  0603 21  0656 21  0909 21  2149 21  0431   WBC 10*3/mm3 17.71* 14.88* 15.04* 16.62* 8.13   HEMOGLOBIN g/dL 13.8 15.0 15.8 17.2 14.0   HEMATOCRIT % 44.6 46.7 49.0 49.8 41.1   PLATELETS 10*3/mm3 148 176 179 237 223   PROCALCITONIN ng/mL  --   --   --  2.65* 0.08     Results from last 7 days   Lab Units 21  0603 07/08/21  0656 21  0909 21  0529 21  21421  0431   SODIUM mmol/L 149*  --  153* 139 140 139 140   POTASSIUM mmol/L 3.2* 3.2* 3.4* 4.0 4.2 4.6 3.9   CHLORIDE mmol/L 111*  --  108* 101 105 103 106   CO2 mmol/L 30.0*  --   34.0* 24.0 18.0* 22.0 22.0   BUN mg/dL 37*  --  52* 50* 33* 28* 12   CREATININE mg/dL 1.13  --  1.68* 1.95* 2.34* 2.29* 0.97   GLUCOSE mg/dL 141*  --  161* 160* 144* 176* 178*   CALCIUM mg/dL 8.3*  --  8.6 9.0 8.9 8.7 8.7   ALT (SGPT) U/L  --   --   --  18 19  --  14   AST (SGOT) U/L  --   --   --  52* 38  --  22   TROPONIN T ng/mL  --   --   --   --   --   --  <0.010   PROBNP pg/mL  --   --   --   --   --  8,004.0* 360.1     Estimated Creatinine Clearance: 56 mL/min (by C-G formula based on SCr of 1.13 mg/dL).    Microbiology Results Abnormal     Procedure Component Value - Date/Time    Blood Culture - Blood, Hand, Right [[id number]] Collected: 07/05/21 2159    Lab Status: Preliminary result Specimen: Blood from Hand, Right Updated: 07/08/21 2315     Blood Culture No growth at 3 days    Narrative:      Aerobic bottle only      Blood Culture - Blood, Hand, Right [[id number]] Collected: 07/05/21 2241    Lab Status: Preliminary result Specimen: Blood from Hand, Right Updated: 07/08/21 2315     Blood Culture No growth at 3 days    Narrative:      Aerobic bottle only      COVID PRE-OP / PRE-PROCEDURE SCREENING ORDER (NO ISOLATION) - Swab, Nasopharynx [[id number]]  (Normal) Collected: 07/05/21 1139    Lab Status: Final result Specimen: Swab from Nasopharynx Updated: 07/05/21 1254    Narrative:      The following orders were created for panel order COVID PRE-OP / PRE-PROCEDURE SCREENING ORDER (NO ISOLATION) - Swab, Nasopharynx.  Procedure                               Abnormality         Status                     ---------                               -----------         ------                     COVID-19,CEPHEID,BRIANNA IN-...[[id number]]  Normal              Final result                 Please view results for these tests on the individual orders.    COVID-19,CEPHEID,BRIANNA IN-HOUSE(OR EMERGENT/ADD-ON),NP SWAB IN TRANSPORT MEDIA 3-4 HR TAT - Swab, Nasopharynx [[id number]]  (Normal) Collected: 07/05/21 1139    Lab Status: Final  result Specimen: Swab from Nasopharynx Updated: 07/05/21 1254     COVID19 Not Detected    Narrative:      Fact sheet for providers: https://www.fda.gov/media/147386/download     Fact sheet for patients: https://www.fda.gov/media/216036/download  Fact sheet for providers: https://www.fda.gov/media/918919/download     Fact sheet for patients: https://www.fda.gov/media/633828/download          Imaging Results (Last 24 Hours)     Procedure Component Value Units Date/Time    XR Abdomen KUB [[id number]] Collected: 07/06/21 1431     Updated: 07/08/21 1906    Narrative:      EXAMINATION: XR ABDOMEN/KUB-07/06/2021:      INDICATION: Ileus; R41.82-Altered mental status, unspecified;  R11.2-Nausea with vomiting, unspecified; I63.9-Cerebral infarction,  unspecified; I16.1-Hypertensive emergency; Z87.898-Personal history of  other specified conditions; R13.10-Dysphagia, unspecified;  R41.841-Cognitive communication deficit; R47.1-Dysarthria and anarthria.        COMPARISON: 07/06/2021.     FINDINGS: Nasogastric tube in place. Nonspecific, nonobstructive bowel  gas pattern with moderate colonic stool burden extending to the  rectosigmoid colon. Chen catheter in situ. Degenerative changes of the  scoliotic spine and pelvis.       Impression:      Nonspecific, nonobstructive bowel gas pattern with decreased  small bowel distention from prior comparison, however, persistent  colonic stool burden concerning for constipation components without  obstructive pattern.     D:  07/06/2021  E:  07/06/2021     This report was finalized on 7/8/2021 7:03 PM by Dr. Marc Almanza.             Results for orders placed during the hospital encounter of 07/05/21    Adult Transthoracic Echo Complete W/ Cont if Necessary Per Protocol    Interpretation Summary  · Estimated left ventricular EF = 65%  · The cardiac valves are anatomically and functionally normal.      I have reviewed the medications:  Scheduled Meds:[START ON 7/11/2021] Pharmacy Consult,  , Does not apply, Once  bisacodyl, 10 mg, Oral, Daily  bisacodyl, 10 mg, Rectal, BID  docusate sodium, 100 mg, Oral, BID  fluticasone, 1 spray, Each Nare, Daily  heparin (porcine), 5,000 Units, Subcutaneous, Q8H  insulin lispro, 0-7 Units, Subcutaneous, TID AC  ipratropium-albuterol, 3 mL, Nebulization, 4x Daily - RT  lacosamide, 50 mg, Intravenous, Q12H  latanoprost, 1 drop, Both Eyes, Nightly  levETIRAcetam, 500 mg, Intravenous, Q12H  methylnaltrexone, 4 mg, Subcutaneous, Every Other Day  metoclopramide, 5 mg, Intravenous, Q6H  palliative care oral rinse, , Mouth/Throat, 4x Daily  piperacillin-tazobactam, 3.375 g, Intravenous, Q8H  sodium chloride, 10 mL, Intravenous, Q12H  sodium chloride, 10 mL, Intravenous, Q12H  timolol, 1 drop, Both Eyes, Q12H  [START ON 7/10/2021] vancomycin, 1,500 mg, Intravenous, Q24H  vancomycin, 1,750 mg, Intravenous, Once      Continuous Infusions:niCARdipine, 5-15 mg/hr, Last Rate: 5 mg/hr (07/09/21 0822)  Pharmacy to dose vancomycin,   sodium chloride, 100 mL/hr, Last Rate: 100 mL/hr (07/09/21 0822)      PRN Meds:.acetaminophen **OR** acetaminophen **OR** acetaminophen  •  dextrose  •  dextrose  •  glucagon (human recombinant)  •  glycerin adult  •  ipratropium-albuterol  •  labetalol  •  LORazepam  •  magnesium sulfate **OR** magnesium sulfate in D5W 1g/100mL (PREMIX) **OR** magnesium sulfate  •  Morphine  •  ondansetron  •  Pharmacy to dose vancomycin  •  polyvinyl alcohol  •  potassium chloride **OR** potassium chloride **OR** potassium chloride  •  sodium chloride  •  sodium chloride  •  sodium chloride    Assessment/Plan   Assessment & Plan     Active Hospital Problems    Diagnosis  POA   • **Suspected cerebrovascular accident (CVA) [R09.89]  Yes   • Hypertensive urgency [I16.0]  Yes   • Acute respiratory failure with hypoxia (CMS/HCC) [J96.01]  Yes   • Altered mental status [R41.82]  Yes   • Hypertension [I10]  Yes   • Hyperlipidemia [E78.5]  Yes   • Generalized convulsive  seizures (CMS/HCC) [R56.9]  Yes      Resolved Hospital Problems   No resolved problems to display.        Brief Hospital Course to date:  Jonatan Ocampo is an 81yoM with h/o left temporal mass, seizures, HTN, HLD who presented to the [facility] ED 7/5 AM as a code stroke with altered mental status, right sided weakness, dysarthria and aphasia. Patient also noted to have vomiting and hypoxia in the ED- requiring 2-3 L on no home supplemental O2 baseline. Was evaluated by the stroke team in the ED, CT imaging with left temporal lobe mass c/w prior MRI, MRI imaging did not reveal acute CVA. Patient currently admitted for ongoing stroke w/u and w/u of altered mental status.     This patient's problems and plans were partially entered by my partner and updated as appropriate by me 07/09/21.    Plan:     Altered mental status/right extremity weakness  Seizures- concern for ongoing seizures  -Already loaded with Keppra 1 g, continue with neurology adjusting dose to 500 BID, Vimpat also added   --Neurology following- concern for ongoing seizures but not status per discussion with Dr. RAM  -MRI negative for acute CVA as above  -cEEG overnight 7/6-7/7, now off cEEG  --seizure precautions  -- mental status still not improved despite control of seizures    Sepsis (fever, leukocytosis, tachycardia)  Acute hypoxic respiratory failure  Elevated D-dimer   Aspiration PNA  -noted to have aspiration event following being cleared for dinner by SLP 7/5 in setting of probable ongoing seizures. CT imaging done 7/5 pm in light of worsening respiratory status confirmed aspiration process  --keep NPO, IV zosyn, follow cultures, wean oxygen as able  --CTA also done as D-dimer near 10- no PE noted  -- added on Vanc today given persistent fevers and worsening leukocytosis on Zosyn.     Acute renal failure  --normal creatinine on admission- then up to 2.3, was initially given dose of lasix overnight 7/5 due to concern for volume overload -- however now  on NS 100cc/hr. Multifactorial in etiology as patient rec'd significant contrast load with MRI/CTAs, also may have some degree of ATN from infections/etc-- monitor, trend, avoid nephroxotins  -- improving/ resolved    Ileus  --noted overnight 7/5. Surgery following, appreciate, with NGT and supportive measures ongoing. Bowel regimen. Having BMs now  -- improving       Glaucoma  -on timolol eyedrops  -continue home drops     Hypertension-- uncontrolled  -lisinopril 20 mg daily on hold given GI-currently uncontrolled, add labetalol prn and titrate as needed        History of [id number] work-up was found to have left temporal lobe mass-thought to be low-grade glioma.  Patient follows up with Dr. Hurt. Continue Keppra as above     Hyperlipidemia-not on statin    Hypernatremia  --likely due to dehydration.  Continue IVFs    Poor overall prognosis. Family now in agreement with DNR/DNI status. Not yet willing to pursue comfort measures but I feel they would be amenable to this hopefully today.  Palliative following. Multiple long discussions with multiple providers regarding poor overall prognosis.  Family has now decided to pursue comfort measures after both Neurology and I talked to them this am.         DVT prophylaxis:  Medical and mechanical DVT prophylaxis orders are present.       Disposition: I expect the patient to be discharged TBD.         CODE STATUS:   Code Status and Medical Interventions:   Ordered at: 07/07/21 0745     Limited Support to NOT Include:    Cardioversion/Defibrillation    Intubation    Vasopressors    Dialysis     Level Of Support Discussed With:    Next of Kin (If No Surrogate)     Code Status:    No CPR     Medical Interventions (Level of Support Prior to Arrest):    Limited       Justina Lujan MD  07/09/21

## 2021-07-09 NOTE — SIGNIFICANT NOTE
07/09/21 0858   SLP Deferred Reason   SLP Deferred Reason Routine  (D/w RN who reports pt not appropriate today.Will defer)

## 2021-07-09 NOTE — INTERVAL H&P NOTE
Pt admitted today to in Hospice. Please refer to Hospice documentation for further information.

## 2021-07-09 NOTE — PROGRESS NOTES
"Palliative Care Progress Note    Date of Admission: 7/5/2021    Code Status:   Current Code Status     Date Active Code Status Order ID Comments User Context       7/9/2021 1540 No CPR [id number]  Izabela Gonzalez APRN Inpatient     Advance Care Planning Activity      Questions for Current Code Status     Question Answer Comment    Code Status No CPR     Medical Interventions (Level of Support Prior to Arrest) Comfort Measures         Subjective:  Patient remains unresponsive  Reviewed current scheduled and prn medications for route, type, dose, and frequency. Reviewed medical record  No current facility-administered medications for this encounter.    Objective:  PPS 10% /87   Pulse (!) 143   Temp 99 °F (37.2 °C)   Resp 28   Ht 175.3 cm (69.02\")   Wt 90.2 kg (198 lb 12.8 oz)   SpO2 (!) 89%   BMI 29.34 kg/m²    Intake & Output (last day)       07/08 0701 - 07/09 0700 07/09 0701 - 07/10 0700    I.V. (mL/kg) 1813.7 (20.1)     NG/     IV Piggyback 650     Total Intake(mL/kg) 2563.7 (28.4)     Urine (mL/kg/hr) 2000 (0.9)     Emesis/NG output 650     Total Output 2650     Net -86.3               Lab Results (last 24 hours)     Procedure Component Value Units Date/Time    POC Glucose Once [[id number]]  (Normal) Collected: 07/09/21 0730    Specimen: Blood Updated: 07/09/21 0741     Glucose 122 mg/dL      Comment: Meter: [id number] : [id number] Miguel Self       Basic Metabolic Panel [[id number]]  (Abnormal) Collected: 07/09/21 0603    Specimen: Blood Updated: 07/09/21 0733     Glucose 141 mg/dL      BUN 37 mg/dL      Creatinine 1.13 mg/dL      Sodium 149 mmol/L      Potassium 3.2 mmol/L      Chloride 111 mmol/L      CO2 30.0 mmol/L      Calcium 8.3 mg/dL      eGFR Non African Amer 62 mL/min/1.73      BUN/Creatinine Ratio 32.7     Anion Gap 8.0 mmol/L     Narrative:      GFR Normal >60  Chronic Kidney Disease <60  Kidney Failure <15      CBC (No Diff) [[id number]]  (Abnormal) Collected: 07/09/21 0603 "    Specimen: Blood Updated: 07/09/21 0659     WBC 17.71 10*3/mm3      RBC 4.25 10*6/mm3      Hemoglobin 13.8 g/dL      Hematocrit 44.6 %      .9 fL      MCH 32.5 pg      MCHC 30.9 g/dL      RDW 14.5 %      RDW-SD 56.6 fl      MPV 10.4 fL      Platelets 148 10*3/mm3     Blood Culture - Blood, Hand, Right [[id number]] Collected: 07/05/21 2159    Specimen: Blood from Hand, Right Updated: 07/08/21 2315     Blood Culture No growth at 3 days    Narrative:      Aerobic bottle only      Blood Culture - Blood, Hand, Right [[id number]] Collected: 07/05/21 2241    Specimen: Blood from Hand, Right Updated: 07/08/21 2315     Blood Culture No growth at 3 days    Narrative:      Aerobic bottle only      Magnesium [[id number]]  (Normal) Collected: 07/08/21 2040    Specimen: Blood Updated: 07/08/21 2210     Magnesium 2.2 mg/dL     Potassium [[id number]]  (Abnormal) Collected: 07/08/21 2040    Specimen: Blood Updated: 07/08/21 2111     Potassium 3.2 mmol/L     POC Glucose Once [[id number]]  (Abnormal) Collected: 07/08/21 1942    Specimen: Blood Updated: 07/08/21 1953     Glucose 165 mg/dL      Comment: Meter: [id number] : [id number] Tomy Lacy           Imaging Results (Last 24 Hours)     ** No results found for the last 24 hours. **        Physical Exam:  Gen: NAD, lying in bed with head elevated  Skin: warm, dry   HEENT: oropharynx dry, tongue dry, soft palate clear  Resp/thorax: even effort, nonlabored, CTA   CV: RRR   ABD: soft, bowel sounds +, non distended  Ext: RUE edema, no redness   Neuro: unresponsive to name or touch, no myoclonus     Reviewed labs and diagnostic results.   Lab Results   Component Value Date    HGBA1C 5.3 02/25/2016     Results from last 7 days   Lab Units 07/09/21  0603   WBC 10*3/mm3 17.71*   HEMOGLOBIN g/dL 13.8   HEMATOCRIT % 44.6   PLATELETS 10*3/mm3 148     Results from last 7 days   Lab Units 07/09/21  0603 07/07/21  0909   SODIUM mmol/L 149* 139   POTASSIUM mmol/L 3.2* 4.0   CHLORIDE mmol/L  111* 101   CO2 mmol/L 30.0* 24.0   BUN mg/dL 37* 50*   CREATININE mg/dL 1.13 1.95*   CALCIUM mg/dL 8.3* 9.0   BILIRUBIN mg/dL  --  0.8   ALK PHOS U/L  --  70   ALT (SGPT) U/L  --  18   AST (SGOT) U/L  --  52*   GLUCOSE mg/dL 141* 160*       Impression: 81 y.o. male with breakthrough seizure, low grade Left temporal glioma    Plan:   Dyspnea/tachypnea - continue with prn morphine   Prn lorazepam    Goals of care discussion - met with wife and son at bedside. Family has been updated by Dr Pope about expected outcomes and prognosis. Family have decided to transition to comfort focused plan of care, reviewed more about hospice services and changes to the plan of care for focus of comfort.  Discussed about continuing certain interventions whether efforts would be prolonging him, IV fluids, oxygen supplement or being able to remove the NGT.   Hospice referral requested for inpatient hospice care.  Time: 30 minutes   > 50% time spent in counseling and education concerning current orders for symptom management with wife and son.    Cherrie yDkes, APRN  [phone]  07/09/21  18:22 EDT

## 2021-07-09 NOTE — CASE MANAGEMENT/SOCIAL WORK
Continued Stay Note  [facility]     Patient Name: Jonatan Ocampo  MRN: [id number]  Today's Date: 7/9/2021    Admit Date: 7/5/2021    Discharge Plan     Row Name 07/09/21 0944       Plan    Plan  CM Update    Patient/Family in Agreement with Plan  yes    Plan Comments  Patient unresponsive, family at bedside - discussed during rounds that there is ongoing goals of care discussions to be had. Palliative care is following - CM will continue to follow for discharge planning as appropriate - luisa [phone]        Discharge Codes    No documentation.             Luisa Mcadams RN

## 2021-07-09 NOTE — PROGRESS NOTES
"Pharmacy Consult-Vancomycin Dosing  Jonatan Ocampo is a  81 y.o. male receiving vancomycin therapy.     Indication: sepsis  Consulting Provider: Justina Lujan MD  ID Consult: no    Goal AUC: 400 - 600 mg/L*hr    Current Antimicrobial Therapy  Vancomycin (day 1)   Zosyn 3.375 gm Q8h    Allergies  Allergies as of 07/05/2021 - Reviewed 07/05/2021   Allergen Reaction Noted    Sulfa antibiotics Hives 05/13/2016       Labs    Results from last 7 days   Lab Units 07/09/21  0603 07/08/21  0656 07/07/21  0909   BUN mg/dL 37* 52* 50*   CREATININE mg/dL 1.13 1.68* 1.95*       Results from last 7 days   Lab Units 07/09/21  0603 07/08/21  0656 07/07/21  0909   WBC 10*3/mm3 17.71* 14.88* 15.04*       Evaluation of Dosing     Is Patient on Dialysis or Renal Replacement: no    Ht - 175.3 cm (69.02\")  Wt - 90.2 kg (198 lb 12.8 oz)    Estimated Creatinine Clearance: 56 mL/min (by C-G formula based on SCr of 1.13 mg/dL).    Intake & Output (last 3 days)         07/06 0701 - 07/07 0700 07/07 0701 - 07/08 0700 07/08 0701 - 07/09 0700 07/09 0701 - 07/10 0700    I.V. (mL/kg) 998 (11.5)  1813.7 (20.1)     NG/GT   100     IV Piggyback 100 50 650     Total Intake(mL/kg) 1098 (12.6) 50 (0.6) 2563.7 (28.4)     Urine (mL/kg/hr) 825 (0.4) 1150 (0.5) 2000 (0.9)     Emesis/NG output 850 2000 650     Stool 0       Total Output 1675 3150 2650     Net -577 -3100 -86.3             Stool Unmeasured Occurrence 2 x               Microbiology and Radiology  Microbiology Results (last 10 days)       Procedure Component Value - Date/Time    Blood Culture - Blood, Hand, Right [[id number]] Collected: 07/05/21 2241    Lab Status: Preliminary result Specimen: Blood from Hand, Right Updated: 07/08/21 2315     Blood Culture No growth at 3 days    Narrative:      Aerobic bottle only      Blood Culture - Blood, Hand, Right [[id number]] Collected: 07/05/21 2159    Lab Status: Preliminary result Specimen: Blood from Hand, Right Updated: 07/08/21 2315     " Blood Culture No growth at 3 days    Narrative:      Aerobic bottle only      COVID PRE-OP / PRE-PROCEDURE SCREENING ORDER (NO ISOLATION) - Swab, Nasopharynx [[id number]]  (Normal) Collected: 07/05/21 1139    Lab Status: Final result Specimen: Swab from Nasopharynx Updated: 07/05/21 1253    Narrative:      The following orders were created for panel order COVID PRE-OP / PRE-PROCEDURE SCREENING ORDER (NO ISOLATION) - Swab, Nasopharynx.  Procedure                               Abnormality         Status                     ---------                               -----------         ------                     COVID-19,CEPHEID,BRIANNA IN-...[[id number]]  Normal              Final result                 Please view results for these tests on the individual orders.    COVID-19,CEPHEID,BRIANNA IN-HOUSE(OR EMERGENT/ADD-ON),NP SWAB IN TRANSPORT MEDIA 3-4 HR TAT - Swab, Nasopharynx [[id number]]  (Normal) Collected: 07/05/21 1139    Lab Status: Final result Specimen: Swab from Nasopharynx Updated: 07/05/21 1255     COVID19 Not Detected    Narrative:      Fact sheet for providers: https://www.fda.gov/media/278254/download     Fact sheet for patients: https://www.fda.gov/media/866552/download  Fact sheet for providers: https://www.fda.gov/media/188233/download     Fact sheet for patients: https://www.fda.gov/media/683388/download            Reported Vancomycin Levels                         [facility] AUC Calculation:    Current AUC:   -- mg/L*hr    Predicted Steady State AUC on Current Dose: -- mg/L*hr  _________________________________    Predicted Steady State AUC on New Dose:   517 mg/L*hr    Assessment/Plan:   Pharmacy dosing vancomycin for sepsis   Goal -600 mg/L*hr  7/9 Cr: 1.13  7/9 WBC: 17.71  24h Tmax: 100.3    -Will give loading dose of 1750 mg IV on 7/9  -Plan for maintenance dose of 1500 mg IV Q24h beginning 7/10  -Vancomycin trough ordered for 7/11 @0500 prior to 3rd dose  -BMP x 3 days  -Monitor renal function,  cultures, infusion related reactions, and clinical status daily.  -Follow vancomycin troughs and adjust doses as clinically appropriate.     Thanks,  Mirella Candelario, Pharmacy Intern  7/9/2021  07:40 EDT

## 2021-07-10 NOTE — NURSING NOTE
"Spoke with son Miguel Angel, and spouse Roxy via telephone to inform of patient's passing.  They stated they would be \"on their way to the hospital.\"   "

## 2021-07-10 NOTE — NURSING NOTE
Brother- Miguel Angel Terrence- notified by telephone this AM of patient transfer and current condition.

## 2021-07-10 NOTE — SIGNIFICANT NOTE
Exam confirms with auscultation zero audible heart tones and zero audible respirations. Mr.Conrad REEMA Ocampo was pronounced dead at 0645.  MD notified by Patient's RN.    Kaye Edmonds RN  Clinical House Supervisor  7/10/2021 07:00 EDT

## 2021-07-10 NOTE — PLAN OF CARE
Patient arrived to unit around 0315.  Transferring nurse stated she will notify family today this morning of transfer and that family visited 7/9 and was aware of the patient's current condition at this time.  Patient very tachypneic upon arrival.  2L NC for comfort. PRN meds given upon arrival to floor.  NG to LWS with dark output. Chen catheter with minimal simin urine.

## 2021-07-11 LAB
QT INTERVAL: 320 MS
QTC INTERVAL: 481 MS

## 2021-07-12 NOTE — DISCHARGE SUMMARY
Date of Death:  7/10/2021  Time of Death:  0645    Presenting Problem/History of Present Illness    Encephalopathy acute      Hospital Course    Jonatan Ocampo is a 81 y.o. male with h/o left temporal mass, seizures, HTN, HLD who presented to the [facility] ED 7/5 AM as a code stroke with altered mental status, right sided weakness, dysarthria and aphasia. Patient also noted to have vomiting and hypoxia in the ED- requiring 2-3 L on no home supplemental O2 baseline. Was evaluated by the stroke team in the ED, CT imaging with left temporal lobe mass c/w prior MRI, MRI imaging did not reveal acute CVA. Patient was admitted for stroke workup and AMS.  Pt had passed a swallow eval initially and, subsequently, he aspirated.  His mental status never fully recovered. He was transferred to another floor for continuous EEG monitoring, however, despite managing his seizures and treating him for his aspiration pneumonitis, he continued to decline. His mental status never recovered and family eventually decided to make him comfort care. He is being transferred to inpatient hospice today.    Social History:  Social History     Tobacco Use   • Smoking status: Never Smoker   • Smokeless tobacco: Never Used   Substance Use Topics   • Alcohol use: No         Consults:   Consults     Date and Time Order Name Status Description    7/8/2021  8:48 AM Inpatient Palliative Care MD Consult Completed     7/6/2021 12:34 AM Inpatient General Surgery Consult Completed     7/5/2021  7:21 AM Inpatient Neurology Consult General Completed     7/5/2021  5:13 AM Inpatient Neurology Consult Stroke Completed           Exam confirms with auscultation zero audible heart tones and zero audible respirations. .Jonatan Ocampo was pronounced dead at 0645.  MD notified by Patient's RN.     Kaye Edmonds, RN  Clinical House Supervisor  7/10/2021 07:00 EDT      Izabela Gonzalez, BRUNO, MHA, APRN  [facility]  Hospice and Palliative Care Nurse  Practitioner  07/12/21  12:49 EDT

## 2022-05-04 NOTE — TELEPHONE ENCOUNTER
----- Message from Cesar Henning MD sent at 4/7/2021  7:32 PM EDT -----  Regarding: MRI brain  Stable/unchanged, keep follow up, call for problems, thanks.  ----- Message -----  From: Bambi Christian APRN  Sent: 3/22/2021   2:27 PM EDT  To: Cesar Henning MD, #    Please notify the patient that per the radiology report alone the temporal lobe.  The Dr. Henning has been following for several years appears stable with no enlargement or growth.  I do highly recommend he follow-up with Dr. Henning as scheduled on 4/15/2021 at 9:15 AM as scheduled so that Dr. Henning can review the imaging again and ensure he agrees with the radiologist interpretation.  Please fax a copy of MRI of the brain report to PCP.  Thanks, SHANON Boss.    (FYI Dr. Henning-report forwarded to you to ensure you can follow-up and review imaging when you return to clinic after 3/20/2021.)     Cosentyx Pregnancy And Lactation Text: This medication is Pregnancy Category B and is considered safe during pregnancy. It is unknown if this medication is excreted in breast milk. Gabapentin Counseling: I discussed with the patient the risks of gabapentin including but not limited to dizziness, somnolence, fatigue and ataxia. Rituxan Counseling:  I discussed with the patient the risks of Rituxan infusions. Side effects can include infusion reactions, severe drug rashes including mucocutaneous reactions, reactivation of latent hepatitis and other infections and rarely progressive multifocal leukoencephalopathy.  All of the patient's questions and concerns were addressed. Clindamycin Pregnancy And Lactation Text: This medication can be used in pregnancy if certain situations. Clindamycin is also present in breast milk. Topical Sulfur Applications Counseling: Topical Sulfur Counseling: Patient counseled that this medication may cause skin irritation or allergic reactions.  In the event of skin irritation, the patient was advised to reduce the amount of the drug applied or use it less frequently.   The patient verbalized understanding of the proper use and possible adverse effects of topical sulfur application.  All of the patient's questions and concerns were addressed. Rifampin Counseling: I discussed with the patient the risks of rifampin including but not limited to liver damage, kidney damage, red-orange body fluids, nausea/vomiting and severe allergy. Ketoconazole Pregnancy And Lactation Text: This medication is Pregnancy Category C and it isn't know if it is safe during pregnancy. It is also excreted in breast milk and breast feeding isn't recommended. Erivedge Counseling- I discussed with the patient the risks of Erivedge including but not limited to nausea, vomiting, diarrhea, constipation, weight loss, changes in the sense of taste, decreased appetite, muscle spasms, and hair loss.  The patient verbalized understanding of the proper use and possible adverse effects of Erivedge.  All of the patient's questions and concerns were addressed. Minoxidil Pregnancy And Lactation Text: This medication has not been assigned a Pregnancy Risk Category but animal studies failed to show danger with the topical medication. It is unknown if the medication is excreted in breast milk. Oxybutynin Pregnancy And Lactation Text: This medication is Pregnancy Category B and is considered safe during pregnancy. It is unknown if it is excreted in breast milk. Clindamycin Counseling: I counseled the patient regarding use of clindamycin as an antibiotic for prophylactic and/or therapeutic purposes. Clindamycin is active against numerous classes of bacteria, including skin bacteria. Side effects may include nausea, diarrhea, gastrointestinal upset, rash, hives, yeast infections, and in rare cases, colitis. Topical Sulfur Applications Pregnancy And Lactation Text: This medication is Pregnancy Category C and has an unknown safety profile during pregnancy. It is unknown if this topical medication is excreted in breast milk. Thalidomide Pregnancy And Lactation Text: This medication is Pregnancy Category X and is absolutely contraindicated during pregnancy. It is unknown if it is excreted in breast milk. Klisyri Pregnancy And Lactation Text: It is unknown if this medication can harm a developing fetus or if it is excreted in breast milk. Metronidazole Pregnancy And Lactation Text: This medication is Pregnancy Category B and considered safe during pregnancy.  It is also excreted in breast milk. High Dose Vitamin A Counseling: Side effects reviewed, pt to contact office should one occur. Hydroxyzine Pregnancy And Lactation Text: This medication is not safe during pregnancy and should not be taken. It is also excreted in breast milk and breast feeding isn't recommended. Protopic Counseling: Patient may experience a mild burning sensation during topical application. Protopic is not approved in children less than 2 years of age. There have been case reports of hematologic and skin malignancies in patients using topical calcineurin inhibitors although causality is questionable. Cyclophosphamide Pregnancy And Lactation Text: This medication is Pregnancy Category D and it isn't considered safe during pregnancy. This medication is excreted in breast milk. Carac Counseling:  I discussed with the patient the risks of Carac including but not limited to erythema, scaling, itching, weeping, crusting, and pain. High Dose Vitamin A Pregnancy And Lactation Text: High dose vitamin A therapy is contraindicated during pregnancy and breast feeding. Simponi Pregnancy And Lactation Text: The risk during pregnancy and breastfeeding is uncertain with this medication. 5-Fu Counseling: 5-Fluorouracil Counseling:  I discussed with the patient the risks of 5-fluorouracil including but not limited to erythema, scaling, itching, weeping, crusting, and pain. Cimetidine Pregnancy And Lactation Text: This medication is Pregnancy Category B and is considered safe during pregnancy. It is also excreted in breast milk and breast feeding isn't recommended. Opioid Pregnancy And Lactation Text: These medications can lead to premature delivery and should be avoided during pregnancy. These medications are also present in breast milk in small amounts. Ivermectin Pregnancy And Lactation Text: This medication is Pregnancy Category C and it isn't known if it is safe during pregnancy. It is also excreted in breast milk. Drysol Counseling:  I discussed with the patient the risks of drysol/aluminum chloride including but not limited to skin rash, itching, irritation, burning. SSKI Counseling:  I discussed with the patient the risks of SSKI including but not limited to thyroid abnormalities, metallic taste, GI upset, fever, headache, acne, arthralgias, paraesthesias, lymphadenopathy, easy bleeding, arrhythmias, and allergic reaction. Terbinafine Counseling: Patient counseling regarding adverse effects of terbinafine including but not limited to headache, diarrhea, rash, upset stomach, liver function test abnormalities, itching, taste/smell disturbance, nausea, abdominal pain, and flatulence.  There is a rare possibility of liver failure that can occur when taking terbinafine.  The patient understands that a baseline LFT and kidney function test may be required. The patient verbalized understanding of the proper use and possible adverse effects of terbinafine.  All of the patient's questions and concerns were addressed. Bexarotene Counseling:  I discussed with the patient the risks of bexarotene including but not limited to hair loss, dry lips/skin/eyes, liver abnormalities, hyperlipidemia, pancreatitis, depression/suicidal ideation, photosensitivity, drug rash/allergic reactions, hypothyroidism, anemia, leukopenia, infection, cataracts, and teratogenicity.  Patient understands that they will need regular blood tests to check lipid profile, liver function tests, white blood cell count, thyroid function tests and pregnancy test if applicable. Glycopyrrolate Pregnancy And Lactation Text: This medication is Pregnancy Category B and is considered safe during pregnancy. It is unknown if it is excreted breast milk. Otezla Counseling: The side effects of Otezla were discussed with the patient, including but not limited to worsening or new depression, weight loss, diarrhea, nausea, upper respiratory tract infection, and headache. Patient instructed to call the office should any adverse effect occur.  The patient verbalized understanding of the proper use and possible adverse effects of Otezla.  All the patient's questions and concerns were addressed. Cimzia Pregnancy And Lactation Text: This medication crosses the placenta but can be considered safe in certain situations. Cimzia may be excreted in breast milk. Fluconazole Counseling:  Patient counseled regarding adverse effects of fluconazole including but not limited to headache, diarrhea, nausea, upset stomach, liver function test abnormalities, taste disturbance, and stomach pain.  There is a rare possibility of liver failure that can occur when taking fluconazole.  The patient understands that monitoring of LFTs and kidney function test may be required, especially at baseline. The patient verbalized understanding of the proper use and possible adverse effects of fluconazole.  All of the patient's questions and concerns were addressed. Finasteride Male Counseling: Finasteride Counseling:  I discussed with the patient the risks of use of finasteride including but not limited to decreased libido, decreased ejaculate volume, gynecomastia, and depression. Women should not handle medication.  All of the patient's questions and concerns were addressed. Cibinqo Counseling: I discussed with the patient the risks of Cibinqo therapy including but not limited to common cold, nausea, headache, cold sores, increased blood CPK levels, dizziness, UTIs, fatigue, acne, and vomitting. Live vaccines should be avoided.  This medication has been linked to serious infections; higher rate of mortality; malignancy and lymphoproliferative disorders; major adverse cardiovascular events; thrombosis; thrombocytopenia and lymphopenia; lipid elevations; and retinal detachment. Libtayo Pregnancy And Lactation Text: This medication is contraindicated in pregnancy and when breast feeding. Bactrim Counseling:  I discussed with the patient the risks of sulfa antibiotics including but not limited to GI upset, allergic reaction, drug rash, diarrhea, dizziness, photosensitivity, and yeast infections.  Rarely, more serious reactions can occur including but not limited to aplastic anemia, agranulocytosis, methemoglobinemia, blood dyscrasias, liver or kidney failure, lung infiltrates or desquamative/blistering drug rashes. Itraconazole Pregnancy And Lactation Text: This medication is Pregnancy Category C and it isn't know if it is safe during pregnancy. It is also excreted in breast milk. Qbrexza Pregnancy And Lactation Text: There is no available data on Qbrexza use in pregnant women.  There is no available data on Qbrexza use in lactation. Picato Counseling:  I discussed with the patient the risks of Picato including but not limited to erythema, scaling, itching, weeping, crusting, and pain. Ilumya Counseling: I discussed with the patient the risks of tildrakizumab including but not limited to immunosuppression, malignancy, posterior leukoencephalopathy syndrome, and serious infections.  The patient understands that monitoring is required including a PPD at baseline and must alert us or the primary physician if symptoms of infection or other concerning signs are noted. Gabapentin Pregnancy And Lactation Text: This medication is Pregnancy Category C and isn't considered safe during pregnancy. It is excreted in breast milk. Libtayo Counseling- I discussed with the patient the risks of Libtayo including but not limited to nausea, vomiting, diarrhea, and bone or muscle pain.  The patient verbalized understanding of the proper use and possible adverse effects of Libtayo.  All of the patient's questions and concerns were addressed. Infliximab Counseling:  I discussed with the patient the risks of infliximab including but not limited to myelosuppression, immunosuppression, autoimmune hepatitis, demyelinating diseases, lymphoma, and serious infections.  The patient understands that monitoring is required including a PPD at baseline and must alert us or the primary physician if symptoms of infection or other concerning signs are noted. Carac Pregnancy And Lactation Text: This medication is Pregnancy Category X and contraindicated in pregnancy and in women who may become pregnant. It is unknown if this medication is excreted in breast milk. Cyclophosphamide Counseling:  I discussed with the patient the risks of cyclophosphamide including but not limited to hair loss, hormonal abnormalities, decreased fertility, abdominal pain, diarrhea, nausea and vomiting, bone marrow suppression and infection. The patient understands that monitoring is required while taking this medication. Enbrel Counseling:  I discussed with the patient the risks of etanercept including but not limited to myelosuppression, immunosuppression, autoimmune hepatitis, demyelinating diseases, lymphoma, and infections.  The patient understands that monitoring is required including a PPD at baseline and must alert us or the primary physician if symptoms of infection or other concerning signs are noted. Rinvoq Pregnancy And Lactation Text: Based on animal studies, Rinvoq may cause embryo-fetal harm when administered to pregnant women.  The medication should not be used in pregnancy.  Breastfeeding is not recommended during treatment and for 6 days after the last dose. Xeljanz Counseling: I discussed with the patient the risks of Xeljanz therapy including increased risk of infection, liver issues, headache, diarrhea, or cold symptoms. Live vaccines should be avoided. They were instructed to call if they have any problems. Calcipotriene Counseling:  I discussed with the patient the risks of calcipotriene including but not limited to erythema, scaling, itching, and irritation. Rituxan Pregnancy And Lactation Text: This medication is Pregnancy Category C and it isn't know if it is safe during pregnancy. It is unknown if this medication is excreted in breast milk but similar antibodies are known to be excreted. Mirvaso Counseling: Mirvaso is a topical medication which can decrease superficial blood flow where applied. Side effects are uncommon and include stinging, redness and allergic reactions. Hydroxychloroquine Pregnancy And Lactation Text: This medication has been shown to cause fetal harm but it isn't assigned a Pregnancy Risk Category. There are small amounts excreted in breast milk. Bexarotene Pregnancy And Lactation Text: This medication is Pregnancy Category X and should not be given to women who are pregnant or may become pregnant. This medication should not be used if you are breast feeding. Xolair Pregnancy And Lactation Text: This medication is Pregnancy Category B and is considered safe during pregnancy. This medication is excreted in breast milk. Opzelura Counseling:  I discussed with the patient the risks of Opzelura including but not limited to nasopharngitis, bronchitis, ear infection, eosinophila, hives, diarrhea, folliculitis, tonsillitis, and rhinorrhea.  Taken orally, this medication has been linked to serious infections; higher rate of mortality; malignancy and lymphoproliferative disorders; major adverse cardiovascular events; thrombosis; thrombocytopenia, anemia, and neutropenia; and lipid elevations. Cyclosporine Counseling:  I discussed with the patient the risks of cyclosporine including but not limited to hypertension, gingival hyperplasia,myelosuppression, immunosuppression, liver damage, kidney damage, neurotoxicity, lymphoma, and serious infections. The patient understands that monitoring is required including baseline blood pressure, CBC, CMP, lipid panel and uric acid, and then 1-2 times monthly CMP and blood pressure. Cyclosporine Pregnancy And Lactation Text: This medication is Pregnancy Category C and it isn't know if it is safe during pregnancy. This medication is excreted in breast milk. Erythromycin Pregnancy And Lactation Text: This medication is Pregnancy Category B and is considered safe during pregnancy. It is also excreted in breast milk. Cephalexin Counseling: I counseled the patient regarding use of cephalexin as an antibiotic for prophylactic and/or therapeutic purposes. Cephalexin (commonly prescribed under brand name Keflex) is a cephalosporin antibiotic which is active against numerous classes of bacteria, including most skin bacteria. Side effects may include nausea, diarrhea, gastrointestinal upset, rash, hives, yeast infections, and in rare cases, hepatitis, kidney disease, seizures, fever, confusion, neurologic symptoms, and others. Patients with severe allergies to penicillin medications are cautioned that there is about a 10% incidence of cross-reactivity with cephalosporins. When possible, patients with penicillin allergies should use alternatives to cephalosporins for antibiotic therapy. Azelaic Acid Counseling: Patient counseled that medicine may cause skin irritation and to avoid applying near the eyes.  In the event of skin irritation, the patient was advised to reduce the amount of the drug applied or use it less frequently.   The patient verbalized understanding of the proper use and possible adverse effects of azelaic acid.  All of the patient's questions and concerns were addressed. Isotretinoin Counseling: Patient should get monthly blood tests, not donate blood, not drive at night if vision affected, not share medication, and not undergo elective surgery for 6 months after tx completed. Side effects reviewed, pt to contact office should one occur. Erythromycin Counseling:  I discussed with the patient the risks of erythromycin including but not limited to GI upset, allergic reaction, drug rash, diarrhea, increase in liver enzymes, and yeast infections. Itraconazole Counseling:  I discussed with the patient the risks of itraconazole including but not limited to liver damage, nausea/vomiting, neuropathy, and severe allergy.  The patient understands that this medication is best absorbed when taken with acidic beverages such as non-diet cola or ginger ale.  The patient understands that monitoring is required including baseline LFTs and repeat LFTs at intervals.  The patient understands that they are to contact us or the primary physician if concerning signs are noted. Elidel Pregnancy And Lactation Text: This medication is Pregnancy Category C. It is unknown if this medication is excreted in breast milk. Sarecycline Counseling: Patient advised regarding possible photosensitivity and discoloration of the teeth, skin, lips, tongue and gums.  Patient instructed to avoid sunlight, if possible.  When exposed to sunlight, patients should wear protective clothing, sunglasses, and sunscreen.  The patient was instructed to call the office immediately if the following severe adverse effects occur:  hearing changes, easy bruising/bleeding, severe headache, or vision changes.  The patient verbalized understanding of the proper use and possible adverse effects of sarecycline.  All of the patient's questions and concerns were addressed. Propranolol Counseling:  I discussed with the patient the risks of propranolol including but not limited to low heart rate, low blood pressure, low blood sugar, restlessness and increased cold sensitivity. They should call the office if they experience any of these side effects. Prednisone Counseling:  I discussed with the patient the risks of prolonged use of prednisone including but not limited to weight gain, insomnia, osteoporosis, mood changes, diabetes, susceptibility to infection, glaucoma and high blood pressure.  In cases where prednisone use is prolonged, patients should be monitored with blood pressure checks, serum glucose levels and an eye exam.  Additionally, the patient may need to be placed on GI prophylaxis, PCP prophylaxis, and calcium and vitamin D supplementation and/or a bisphosphonate.  The patient verbalized understanding of the proper use and the possible adverse effects of prednisone.  All of the patient's questions and concerns were addressed. Topical Retinoid counseling:  Patient advised to apply a pea-sized amount only at bedtime and wait 30 minutes after washing their face before applying.  If too drying, patient may add a non-comedogenic moisturizer. The patient verbalized understanding of the proper use and possible adverse effects of retinoids.  All of the patient's questions and concerns were addressed. Colchicine Counseling:  Patient counseled regarding adverse effects including but not limited to stomach upset (nausea, vomiting, stomach pain, or diarrhea).  Patient instructed to limit alcohol consumption while taking this medication.  Colchicine may reduce blood counts especially with prolonged use.  The patient understands that monitoring of kidney function and blood counts may be required, especially at baseline. The patient verbalized understanding of the proper use and possible adverse effects of colchicine.  All of the patient's questions and concerns were addressed. Tazorac Pregnancy And Lactation Text: This medication is not safe during pregnancy. It is unknown if this medication is excreted in breast milk. Cibinqo Pregnancy And Lactation Text: It is unknown if this medication will adversely affect pregnancy or breast feeding.  You should not take this medication if you are currently pregnant or planning a pregnancy or while breastfeeding. Nsaids Pregnancy And Lactation Text: These medications are considered safe up to 30 weeks gestation. It is excreted in breast milk. Azithromycin Pregnancy And Lactation Text: This medication is considered safe during pregnancy and is also secreted in breast milk. Ketoconazole Counseling:   Patient counseled regarding improving absorption with orange juice.  Adverse effects include but are not limited to breast enlargement, headache, diarrhea, nausea, upset stomach, liver function test abnormalities, taste disturbance, and stomach pain.  There is a rare possibility of liver failure that can occur when taking ketoconazole. The patient understands that monitoring of LFTs may be required, especially at baseline. The patient verbalized understanding of the proper use and possible adverse effects of ketoconazole.  All of the patient's questions and concerns were addressed. Quinolones Counseling:  I discussed with the patient the risks of fluoroquinolones including but not limited to GI upset, allergic reaction, drug rash, diarrhea, dizziness, photosensitivity, yeast infections, liver function test abnormalities, tendonitis/tendon rupture. Adbry Counseling: I discussed with the patient the risks of tralokinumab including but not limited to eye infection and irritation, cold sores, injection site reactions, worsening of asthma, allergic reactions and increased risk of parasitic infection.  Live vaccines should be avoided while taking tralokinumab. The patient understands that monitoring is required and they must alert us or the primary physician if symptoms of infection or other concerning signs are noted. Spironolactone Pregnancy And Lactation Text: This medication can cause feminization of the male fetus and should be avoided during pregnancy. The active metabolite is also found in breast milk. Humira Counseling:  I discussed with the patient the risks of adalimumab including but not limited to myelosuppression, immunosuppression, autoimmune hepatitis, demyelinating diseases, lymphoma, and serious infections.  The patient understands that monitoring is required including a PPD at baseline and must alert us or the primary physician if symptoms of infection or other concerning signs are noted. Doxepin Pregnancy And Lactation Text: This medication is Pregnancy Category C and it isn't known if it is safe during pregnancy. It is also excreted in breast milk and breast feeding isn't recommended. Wartpeel Counseling:  I discussed with the patient the risks of Wartpeel including but not limited to erythema, scaling, itching, weeping, crusting, and pain. Zyclara Counseling:  I discussed with the patient the risks of imiquimod including but not limited to erythema, scaling, itching, weeping, crusting, and pain.  Patient understands that the inflammatory response to imiquimod is variable from person to person and was educated regarded proper titration schedule.  If flu-like symptoms develop, patient knows to discontinue the medication and contact us. Solaraze Pregnancy And Lactation Text: This medication is Pregnancy Category B and is considered safe. There is some data to suggest avoiding during the third trimester. It is unknown if this medication is excreted in breast milk. Methotrexate Pregnancy And Lactation Text: This medication is Pregnancy Category X and is known to cause fetal harm. This medication is excreted in breast milk. Dupixent Pregnancy And Lactation Text: This medication likely crosses the placenta but the risk for the fetus is uncertain. This medication is excreted in breast milk. Doxycycline Pregnancy And Lactation Text: This medication is Pregnancy Category D and not consider safe during pregnancy. It is also excreted in breast milk but is considered safe for shorter treatment courses. Protopic Pregnancy And Lactation Text: This medication is Pregnancy Category C. It is unknown if this medication is excreted in breast milk when applied topically. Hydroxychloroquine Counseling:  I discussed with the patient that a baseline ophthalmologic exam is needed at the start of therapy and every year thereafter while on therapy. A CBC may also be warranted for monitoring.  The side effects of this medication were discussed with the patient, including but not limited to agranulocytosis, aplastic anemia, seizures, rashes, retinopathy, and liver toxicity. Patient instructed to call the office should any adverse effect occur.  The patient verbalized understanding of the proper use and possible adverse effects of Plaquenil.  All the patient's questions and concerns were addressed. Azithromycin Counseling:  I discussed with the patient the risks of azithromycin including but not limited to GI upset, allergic reaction, drug rash, diarrhea, and yeast infections. Cephalexin Pregnancy And Lactation Text: This medication is Pregnancy Category B and considered safe during pregnancy.  It is also excreted in breast milk but can be used safely for shorter doses. Acitretin Counseling:  I discussed with the patient the risks of acitretin including but not limited to hair loss, dry lips/skin/eyes, liver damage, hyperlipidemia, depression/suicidal ideation, photosensitivity.  Serious rare side effects can include but are not limited to pancreatitis, pseudotumor cerebri, bony changes, clot formation/stroke/heart attack.  Patient understands that alcohol is contraindicated since it can result in liver toxicity and significantly prolong the elimination of the drug by many years. Eucrisa Counseling: Patient may experience a mild burning sensation during topical application. Eucrisa is not approved in children less than 2 years of age. Mirvaso Pregnancy And Lactation Text: This medication has not been assigned a Pregnancy Risk Category. It is unknown if the medication is excreted in breast milk. Hydroquinone Counseling:  Patient advised that medication may result in skin irritation, lightening (hypopigmentation), dryness, and burning.  In the event of skin irritation, the patient was advised to reduce the amount of the drug applied or use it less frequently.  Rarely, spots that are treated with hydroquinone can become darker (pseudoochronosis).  Should this occur, patient instructed to stop medication and call the office. The patient verbalized understanding of the proper use and possible adverse effects of hydroquinone.  All of the patient's questions and concerns were addressed. Metronidazole Counseling:  I discussed with the patient the risks of metronidazole including but not limited to seizures, nausea/vomiting, a metallic taste in the mouth, nausea/vomiting and severe allergy. Siliq Counseling:  I discussed with the patient the risks of Siliq including but not limited to new or worsening depression, suicidal thoughts and behavior, immunosuppression, malignancy, posterior leukoencephalopathy syndrome, and serious infections.  The patient understands that monitoring is required including a PPD at baseline and must alert us or the primary physician if symptoms of infection or other concerning signs are noted. There is also a special program designed to monitor depression which is required with Siliq. Sski Pregnancy And Lactation Text: This medication is Pregnancy Category D and isn't considered safe during pregnancy. It is excreted in breast milk. Odomzo Counseling- I discussed with the patient the risks of Odomzo including but not limited to nausea, vomiting, diarrhea, constipation, weight loss, changes in the sense of taste, decreased appetite, muscle spasms, and hair loss.  The patient verbalized understanding of the proper use and possible adverse effects of Odomzo.  All of the patient's questions and concerns were addressed. Clofazimine Counseling:  I discussed with the patient the risks of clofazimine including but not limited to skin and eye pigmentation, liver damage, nausea/vomiting, gastrointestinal bleeding and allergy. Cellcept Counseling:  I discussed with the patient the risks of mycophenolate mofetil including but not limited to infection/immunosuppression, GI upset, hypokalemia, hypercholesterolemia, bone marrow suppression, lymphoproliferative disorders, malignancy, GI ulceration/bleed/perforation, colitis, interstitial lung disease, kidney failure, progressive multifocal leukoencephalopathy, and birth defects.  The patient understands that monitoring is required including a baseline creatinine and regular CBC testing. In addition, patient must alert us immediately if symptoms of infection or other concerning signs are noted. Propranolol Pregnancy And Lactation Text: This medication is Pregnancy Category C and it isn't known if it is safe during pregnancy. It is excreted in breast milk. Nsaids Counseling: NSAID Counseling: I discussed with the patient that NSAIDs should be taken with food. Prolonged use of NSAIDs can result in the development of stomach ulcers.  Patient advised to stop taking NSAIDs if abdominal pain occurs.  The patient verbalized understanding of the proper use and possible adverse effects of NSAIDs.  All of the patient's questions and concerns were addressed. Spironolactone Counseling: Patient advised regarding risks of diarrhea, abdominal pain, hyperkalemia, birth defects (for female patients), liver toxicity and renal toxicity. The patient may need blood work to monitor liver and kidney function and potassium levels while on therapy. The patient verbalized understanding of the proper use and possible adverse effects of spironolactone.  All of the patient's questions and concerns were addressed. Ivermectin Counseling:  Patient instructed to take medication on an empty stomach with a full glass of water.  Patient informed of potential adverse effects including but not limited to nausea, diarrhea, dizziness, itching, and swelling of the extremities or lymph nodes.  The patient verbalized understanding of the proper use and possible adverse effects of ivermectin.  All of the patient's questions and concerns were addressed. Valtrex Pregnancy And Lactation Text: this medication is Pregnancy Category B and is considered safe during pregnancy. This medication is not directly found in breast milk but it's metabolite acyclovir is present. Otezla Pregnancy And Lactation Text: This medication is Pregnancy Category C and it isn't known if it is safe during pregnancy. It is unknown if it is excreted in breast milk. Birth Control Pills Pregnancy And Lactation Text: This medication should be avoided if pregnant and for the first 30 days post-partum. Simponi Counseling:  I discussed with the patient the risks of golimumab including but not limited to myelosuppression, immunosuppression, autoimmune hepatitis, demyelinating diseases, lymphoma, and serious infections.  The patient understands that monitoring is required including a PPD at baseline and must alert us or the primary physician if symptoms of infection or other concerning signs are noted. Include Pregnancy/Lactation Warning?: No Isotretinoin Pregnancy And Lactation Text: This medication is Pregnancy Category X and is considered extremely dangerous during pregnancy. It is unknown if it is excreted in breast milk. Acitretin Pregnancy And Lactation Text: This medication is Pregnancy Category X and should not be given to women who are pregnant or may become pregnant in the future. This medication is excreted in breast milk. Benzoyl Peroxide Counseling: Patient counseled that medicine may cause skin irritation and bleach clothing.  In the event of skin irritation, the patient was advised to reduce the amount of the drug applied or use it less frequently.   The patient verbalized understanding of the proper use and possible adverse effects of benzoyl peroxide.  All of the patient's questions and concerns were addressed. Azathioprine Pregnancy And Lactation Text: This medication is Pregnancy Category D and isn't considered safe during pregnancy. It is unknown if this medication is excreted in breast milk. Birth Control Pills Counseling: Birth Control Pill Counseling: I discussed with the patient the potential side effects of OCPs including but not limited to increased risk of stroke, heart attack, thrombophlebitis, deep venous thrombosis, hepatic adenomas, breast changes, GI upset, headaches, and depression.  The patient verbalized understanding of the proper use and possible adverse effects of OCPs. All of the patient's questions and concerns were addressed. Dutasteride Pregnancy And Lactation Text: This medication is absolutely contraindicated in women, especially during pregnancy and breast feeding. Feminization of male fetuses is possible if taking while pregnant. Topical Ketoconazole Counseling: Patient counseled that this medication may cause skin irritation or allergic reactions.  In the event of skin irritation, the patient was advised to reduce the amount of the drug applied or use it less frequently.   The patient verbalized understanding of the proper use and possible adverse effects of ketoconazole.  All of the patient's questions and concerns were addressed. Tetracycline Pregnancy And Lactation Text: This medication is Pregnancy Category D and not consider safe during pregnancy. It is also excreted in breast milk. Rhofade Counseling: Rhofade is a topical medication which can decrease superficial blood flow where applied. Side effects are uncommon and include stinging, redness and allergic reactions. Tranexamic Acid Pregnancy And Lactation Text: It is unknown if this medication is safe during pregnancy or breast feeding. Niacinamide Counseling: I recommended taking niacin or niacinamide, also know as vitamin B3, twice daily. Recent evidence suggests that taking vitamin B3 (500 mg twice daily) can reduce the risk of actinic keratoses and non-melanoma skin cancers. Side effects of vitamin B3 include flushing and headache. Xelramaz Pregnancy And Lactation Text: This medication is Pregnancy Category D and is not considered safe during pregnancy.  The risk during breast feeding is also uncertain. Tazorac Counseling:  Patient advised that medication is irritating and drying.  Patient may need to apply sparingly and wash off after an hour before eventually leaving it on overnight.  The patient verbalized understanding of the proper use and possible adverse effects of tazorac.  All of the patient's questions and concerns were addressed. Niacinamide Pregnancy And Lactation Text: These medications are considered safe during pregnancy. Glycopyrrolate Counseling:  I discussed with the patient the risks of glycopyrrolate including but not limited to skin rash, drowsiness, dry mouth, difficulty urinating, and blurred vision. Drysol Pregnancy And Lactation Text: This medication is considered safe during pregnancy and breast feeding. Finasteride Pregnancy And Lactation Text: This medication is absolutely contraindicated during pregnancy. It is unknown if it is excreted in breast milk. Dupixent Counseling: I discussed with the patient the risks of dupilumab including but not limited to eye infection and irritation, cold sores, injection site reactions, worsening of asthma, allergic reactions and increased risk of parasitic infection.  Live vaccines should be avoided while taking dupilumab. Dupilumab will also interact with certain medications such as warfarin and cyclosporine. The patient understands that monitoring is required and they must alert us or the primary physician if symptoms of infection or other concerning signs are noted. Rifampin Pregnancy And Lactation Text: This medication is Pregnancy Category C and it isn't know if it is safe during pregnancy. It is also excreted in breast milk and should not be used if you are breast feeding. Azathioprine Counseling:  I discussed with the patient the risks of azathioprine including but not limited to myelosuppression, immunosuppression, hepatotoxicity, lymphoma, and infections.  The patient understands that monitoring is required including baseline LFTs, Creatinine, possible TPMP genotyping and weekly CBCs for the first month and then every 2 weeks thereafter.  The patient verbalized understanding of the proper use and possible adverse effects of azathioprine.  All of the patient's questions and concerns were addressed. Dapsone Pregnancy And Lactation Text: This medication is Pregnancy Category C and is not considered safe during pregnancy or breast feeding. Winlevi Pregnancy And Lactation Text: This medication is considered safe during pregnancy and breastfeeding. Winlevi Counseling:  I discussed with the patient the risks of topical clascoterone including but not limited to erythema, scaling, itching, and stinging. Patient voiced their understanding. Detail Level: Simple Arava Counseling:  Patient counseled regarding adverse effects of Arava including but not limited to nausea, vomiting, abnormalities in liver function tests. Patients may develop mouth sores, rash, diarrhea, and abnormalities in blood counts. The patient understands that monitoring is required including LFTs and blood counts.  There is a rare possibility of scarring of the liver and lung problems that can occur when taking methotrexate. Persistent nausea, loss of appetite, pale stools, dark urine, cough, and shortness of breath should be reported immediately. Patient advised to discontinue Arava treatment and consult with a physician prior to attempting conception. The patient will have to undergo a treatment to eliminate Arava from the body prior to conception. Albendazole Counseling:  I discussed with the patient the risks of albendazole including but not limited to cytopenia, kidney damage, nausea/vomiting and severe allergy.  The patient understands that this medication is being used in an off-label manner. Dapsone Counseling: I discussed with the patient the risks of dapsone including but not limited to hemolytic anemia, agranulocytosis, rashes, methemoglobinemia, kidney failure, peripheral neuropathy, headaches, GI upset, and liver toxicity.  Patients who start dapsone require monitoring including baseline LFTs and weekly CBCs for the first month, then every month thereafter.  The patient verbalized understanding of the proper use and possible adverse effects of dapsone.  All of the patient's questions and concerns were addressed. Cimetidine Counseling:  I discussed with the patient the risks of Cimetidine including but not limited to gynecomastia, headache, diarrhea, nausea, drowsiness, arrhythmias, pancreatitis, skin rashes, psychosis, bone marrow suppression and kidney toxicity. Cantharidin Pregnancy And Lactation Text: The use of this medication during pregnancy or lactation is not recommended as there is insufficient data. Bactrim Pregnancy And Lactation Text: This medication is Pregnancy Category D and is known to cause fetal risk.  It is also excreted in breast milk. Calcipotriene Pregnancy And Lactation Text: This medication has not been proven safe during pregnancy. It is unknown if this medication is excreted in breast milk. Klisyri Counseling:  I discussed with the patient the risks of Klisyri including but not limited to erythema, scaling, itching, weeping, crusting, and pain. Qbrexza Counseling:  I discussed with the patient the risks of Qbrexza including but not limited to headache, mydriasis, blurred vision, dry eyes, nasal dryness, dry mouth, dry throat, dry skin, urinary hesitation, and constipation.  Local skin reactions including erythema, burning, stinging, and itching can also occur. Rinvoq Counseling: I discussed with the patient the risks of Rinvoq therapy including but not limited to upper respiratory tract infections, shingles, cold sores, bronchitis, nausea, cough, fever, acne, and headache. Live vaccines should be avoided.  This medication has been linked to serious infections; higher rate of mortality; malignancy and lymphoproliferative disorders; major adverse cardiovascular events; thrombosis; thrombocytopenia, anemia, and neutropenia; lipid elevations; liver enzyme elevations; and gastrointestinal perforations. Oxybutynin Counseling:  I discussed with the patient the risks of oxybutynin including but not limited to skin rash, drowsiness, dry mouth, difficulty urinating, and blurred vision. Tremfya Counseling: I discussed with the patient the risks of guselkumab including but not limited to immunosuppression, serious infections, and drug reactions.  The patient understands that monitoring is required including a PPD at baseline and must alert us or the primary physician if symptoms of infection or other concerning signs are noted. Benzoyl Peroxide Pregnancy And Lactation Text: This medication is Pregnancy Category C. It is unknown if benzoyl peroxide is excreted in breast milk. Opzelura Pregnancy And Lactation Text: There is insufficient data to evaluate drug-associated risk for major birth defects, miscarriage, or other adverse maternal or fetal outcomes.  There is a pregnancy registry that monitors pregnancy outcomes in pregnant persons exposed to the medication during pregnancy.  It is unknown if this medication is excreted in breast milk.  Do not breastfeed during treatment and for about 4 weeks after the last dose. Opioid Counseling: I discussed with the patient the potential side effects of opioids including but not limited to addiction, altered mental status, and depression. I stressed avoiding alcohol, benzodiazepines, muscle relaxants and sleep aids unless specifically okayed by a physician. The patient verbalized understanding of the proper use and possible adverse effects of opioids. All of the patient's questions and concerns were addressed. They were instructed to flush the remaining pills down the toilet if they did not need them for pain. Dutasteride Male Counseling: Dustasteride Counseling:  I discussed with the patient the risks of use of dutasteride including but not limited to decreased libido, decreased ejaculate volume, and gynecomastia. Women who can become pregnant should not handle medication.  All of the patient's questions and concerns were addressed. Minocycline Counseling: Patient advised regarding possible photosensitivity and discoloration of the teeth, skin, lips, tongue and gums.  Patient instructed to avoid sunlight, if possible.  When exposed to sunlight, patients should wear protective clothing, sunglasses, and sunscreen.  The patient was instructed to call the office immediately if the following severe adverse effects occur:  hearing changes, easy bruising/bleeding, severe headache, or vision changes.  The patient verbalized understanding of the proper use and possible adverse effects of minocycline.  All of the patient's questions and concerns were addressed. Tetracycline Counseling: Patient counseled regarding possible photosensitivity and increased risk for sunburn.  Patient instructed to avoid sunlight, if possible.  When exposed to sunlight, patients should wear protective clothing, sunglasses, and sunscreen.  The patient was instructed to call the office immediately if the following severe adverse effects occur:  hearing changes, easy bruising/bleeding, severe headache, or vision changes.  The patient verbalized understanding of the proper use and possible adverse effects of tetracycline.  All of the patient's questions and concerns were addressed. Patient understands to avoid pregnancy while on therapy due to potential birth defects. Tranexamic Acid Counseling:  Patient advised of the small risk of bleeding problems with tranexamic acid. They were also instructed to call if they developed any nausea, vomiting or diarrhea. All of the patient's questions and concerns were addressed. Valtrex Counseling: I discussed with the patient the risks of valacyclovir including but not limited to kidney damage, nausea, vomiting and severe allergy.  The patient understands that if the infection seems to be worsening or is not improving, they are to call. Topical Clindamycin Counseling: Patient counseled that this medication may cause skin irritation or allergic reactions.  In the event of skin irritation, the patient was advised to reduce the amount of the drug applied or use it less frequently.   The patient verbalized understanding of the proper use and possible adverse effects of clindamycin.  All of the patient's questions and concerns were addressed. Doxepin Counseling:  Patient advised that the medication is sedating and not to drive a car after taking this medication. Patient informed of potential adverse effects including but not limited to dry mouth, urinary retention, and blurry vision.  The patient verbalized understanding of the proper use and possible adverse effects of doxepin.  All of the patient's questions and concerns were addressed. Adbry Pregnancy And Lactation Text: It is unknown if this medication will adversely affect pregnancy or breast feeding. Stelara Counseling:  I discussed with the patient the risks of ustekinumab including but not limited to immunosuppression, malignancy, posterior leukoencephalopathy syndrome, and serious infections.  The patient understands that monitoring is required including a PPD at baseline and must alert us or the primary physician if symptoms of infection or other concerning signs are noted. Elidel Counseling: Patient may experience a mild burning sensation during topical application. Elidel is not approved in children less than 2 years of age. There have been case reports of hematologic and skin malignancies in patients using topical calcineurin inhibitors although causality is questionable. Methotrexate Counseling:  Patient counseled regarding adverse effects of methotrexate including but not limited to nausea, vomiting, abnormalities in liver function tests. Patients may develop mouth sores, rash, diarrhea, and abnormalities in blood counts. The patient understands that monitoring is required including LFT's and blood counts.  There is a rare possibility of scarring of the liver and lung problems that can occur when taking methotrexate. Persistent nausea, loss of appetite, pale stools, dark urine, cough, and shortness of breath should be reported immediately. Patient advised to discontinue methotrexate treatment at least three months before attempting to become pregnant.  I discussed the need for folate supplements while taking methotrexate.  These supplements can decrease side effects during methotrexate treatment. The patient verbalized understanding of the proper use and possible adverse effects of methotrexate.  All of the patient's questions and concerns were addressed. Oral Minoxidil Pregnancy And Lactation Text: This medication should only be used when clearly needed if you are pregnant, attempting to become pregnant or breast feeding. Oral Minoxidil Counseling- I discussed with the patient the risks of oral minoxidil including but not limited to shortness of breath, swelling of the feet or ankles, dizziness, lightheadedness, unwanted hair growth and allergic reaction.  The patient verbalized understanding of the proper use and possible adverse effects of oral minoxidil.  All of the patient's questions and concerns were addressed. Minoxidil Counseling: Minoxidil is a topical medication which can increase blood flow where it is applied. It is uncertain how this medication increases hair growth. Side effects are uncommon and include stinging and allergic reactions. Cimzia Counseling:  I discussed with the patient the risks of Cimzia including but not limited to immunosuppression, allergic reactions and infections.  The patient understands that monitoring is required including a PPD at baseline and must alert us or the primary physician if symptoms of infection or other concerning signs are noted. Taltz Counseling: I discussed with the patient the risks of ixekizumab including but not limited to immunosuppression, serious infections, worsening of inflammatory bowel disease and drug reactions.  The patient understands that monitoring is required including a PPD at baseline and must alert us or the primary physician if symptoms of infection or other concerning signs are noted. Hydroxyzine Counseling: Patient advised that the medication is sedating and not to drive a car after taking this medication.  Patient informed of potential adverse effects including but not limited to dry mouth, urinary retention, and blurry vision.  The patient verbalized understanding of the proper use and possible adverse effects of hydroxyzine.  All of the patient's questions and concerns were addressed. Imiquimod Counseling:  I discussed with the patient the risks of imiquimod including but not limited to erythema, scaling, itching, weeping, crusting, and pain.  Patient understands that the inflammatory response to imiquimod is variable from person to person and was educated regarded proper titration schedule.  If flu-like symptoms develop, patient knows to discontinue the medication and contact us. Doxycycline Counseling:  Patient counseled regarding possible photosensitivity and increased risk for sunburn.  Patient instructed to avoid sunlight, if possible.  When exposed to sunlight, patients should wear protective clothing, sunglasses, and sunscreen.  The patient was instructed to call the office immediately if the following severe adverse effects occur:  hearing changes, easy bruising/bleeding, severe headache, or vision changes.  The patient verbalized understanding of the proper use and possible adverse effects of doxycycline.  All of the patient's questions and concerns were addressed. Griseofulvin Counseling:  I discussed with the patient the risks of griseofulvin including but not limited to photosensitivity, cytopenia, liver damage, nausea/vomiting and severe allergy.  The patient understands that this medication is best absorbed when taken with a fatty meal (e.g., ice cream or french fries). Cosentyx Counseling:  I discussed with the patient the risks of Cosentyx including but not limited to worsening of Crohn's disease, immunosuppression, allergic reactions and infections.  The patient understands that monitoring is required including a PPD at baseline and must alert us or the primary physician if symptoms of infection or other concerning signs are noted. Xolair Counseling:  Patient informed of potential adverse effects including but not limited to fever, muscle aches, rash and allergic reactions.  The patient verbalized understanding of the proper use and possible adverse effects of Xolair.  All of the patient's questions and concerns were addressed. Solaraze Counseling:  I discussed with the patient the risks of Solaraze including but not limited to erythema, scaling, itching, weeping, crusting, and pain. Griseofulvin Pregnancy And Lactation Text: This medication is Pregnancy Category X and is known to cause serious birth defects. It is unknown if this medication is excreted in breast milk but breast feeding should be avoided. Thalidomide Counseling: I discussed with the patient the risks of thalidomide including but not limited to birth defects, anxiety, weakness, chest pain, dizziness, cough and severe allergy. Skyrizi Counseling: I discussed with the patient the risks of risankizumab-rzaa including but not limited to immunosuppression, and serious infections.  The patient understands that monitoring is required including a PPD at baseline and must alert us or the primary physician if symptoms of infection or other concerning signs are noted.